# Patient Record
Sex: FEMALE | Race: BLACK OR AFRICAN AMERICAN | Employment: OTHER | URBAN - METROPOLITAN AREA
[De-identification: names, ages, dates, MRNs, and addresses within clinical notes are randomized per-mention and may not be internally consistent; named-entity substitution may affect disease eponyms.]

---

## 2017-05-29 ENCOUNTER — DOCUMENTATION ONLY (OUTPATIENT)
Dept: ONCOLOGY | Age: 82
End: 2017-05-29

## 2017-06-02 ENCOUNTER — HOSPITAL ENCOUNTER (OUTPATIENT)
Dept: LAB | Age: 82
Discharge: HOME | End: 2017-06-02
Payer: MEDICARE

## 2017-06-02 ENCOUNTER — OFFICE VISIT (OUTPATIENT)
Dept: ONCOLOGY | Age: 82
End: 2017-06-02

## 2017-06-02 VITALS
WEIGHT: 147.6 LBS | DIASTOLIC BLOOD PRESSURE: 88 MMHG | OXYGEN SATURATION: 98 % | HEART RATE: 64 BPM | RESPIRATION RATE: 16 BRPM | BODY MASS INDEX: 23.72 KG/M2 | TEMPERATURE: 97 F | SYSTOLIC BLOOD PRESSURE: 190 MMHG | HEIGHT: 66 IN

## 2017-06-02 DIAGNOSIS — D69.3 ITP SECONDARY TO INFECTION (HCC): ICD-10-CM

## 2017-06-02 DIAGNOSIS — G40.909 SEIZURE DISORDER (HCC): ICD-10-CM

## 2017-06-02 DIAGNOSIS — F41.9 ANXIETY: ICD-10-CM

## 2017-06-02 DIAGNOSIS — D69.3 ITP SECONDARY TO INFECTION (HCC): Primary | ICD-10-CM

## 2017-06-02 PROCEDURE — 80053 COMPREHEN METABOLIC PANEL: CPT

## 2017-06-02 PROCEDURE — 85025 COMPLETE CBC W/AUTO DIFF WBC: CPT

## 2017-06-02 PROCEDURE — 36415 COLL VENOUS BLD VENIPUNCTURE: CPT

## 2017-06-02 PROCEDURE — 83615 LACTATE (LD) (LDH) ENZYME: CPT

## 2017-06-02 RX ORDER — CHLORHEXIDINE GLUCONATE 1.2 MG/ML
RINSE ORAL
Refills: 1 | COMMUNITY
Start: 2017-05-26 | End: 2021-01-01

## 2017-06-02 NOTE — MR AVS SNAPSHOT
Visit Information Date & Time Provider Department Dept. Phone Encounter #  
 6/2/2017  9:00 AM Daniel Galaviz MD [facility] Oncology at [address] [id number] Upcoming Health Maintenance Date Due DTaP/Tdap/Td series (1 - Tdap) 7/26/1958 ZOSTER VACCINE AGE 60> 7/26/1997 GLAUCOMA SCREENING Q2Y 7/26/2002 OSTEOPOROSIS SCREENING (DEXA) 7/26/2002 Pneumococcal 65+ Low/Medium Risk (1 of 2 - PCV13) 7/26/2002 MEDICARE YEARLY EXAM 7/26/2002 INFLUENZA AGE 9 TO ADULT 8/1/2017 Allergies as of 6/2/2017  Review Complete On: 6/2/2017 By: Melinda Trimble Severity Noted Reaction Type Reactions Aspirin  09/17/2012    Hives Tramadol  09/17/2012    Seizures Current Immunizations  Never Reviewed No immunizations on file. Not reviewed this visit You Were Diagnosed With   
  
 Codes Comments ITP secondary to infection    -  Primary ICD-10-CM: D69.59 ICD-9-CM: 287.49 Vitals BP Pulse Temp Resp Height(growth percentile) Weight(growth percentile) 190/88 64 97 °F (36.1 °C) 16 5' 6\" (1.676 m) 147 lb 9.6 oz (67 kg) SpO2 BMI OB Status Smoking Status 98% 23.82 kg/m2 Postmenopausal Never Smoker Vitals History BMI and BSA Data Body Mass Index Body Surface Area  
 23.82 kg/m 2 1.77 m 2 Preferred Pharmacy Pharmacy Name Phone [id number] [phone] Your Updated Medication List  
  
   
This list is accurate as of: 6/2/17 10:33 AM.  Always use your most recent med list.  
  
  
  
  
 CALCIUM + D PO Take 2 Tabs by mouth daily. 1,200/1,000MG  
  
 chlorhexidine 0.12 % solution Commonly known as:  PERIDEX  
USE AS DIRECTED ON BOTTLE  
  
 danazol 200 mg capsule Commonly known as:  DANOCRINE Take 200 mg by mouth daily. levETIRAcetam 500 mg tablet Commonly known as:  KEPPRA Take 500 mg by mouth daily. lisinopril 20 mg tablet Commonly known as:  PRINIVIL, ZESTRIL  
TAKE 1 TABLET BY MOUTH EVERY DAY To-Do List   
 06/02/2017 Lab:  CBC WITH AUTOMATED DIFF   
  
 06/02/2017 Lab:  LD   
  
 06/02/2017 Lab:  METABOLIC PANEL, COMPREHENSIVE Introducing hospitals & HEALTH SERVICES! [employer] introduces [employer] patient portal. Now you can access parts of your medical record, email your doctor's office, and request medication refills online. 1. In your internet browser, go to [email or web address] 2. Click on the First Time User? Click Here link in the Sign In box. You will see the New Member Sign Up page. 3. Enter your Inmoo Access Code exactly as it appears below. You will not need to use this code after youve completed the sign-up process. If you do not sign up before the expiration date, you must request a new code. · Inmoo Access Code: [email or web address] Expires: 8/31/2017 10:33 AM 
 
4. Enter the last four digits of your Social Security Number (xxxx) and Date of Birth (mm/dd/yyyy) as indicated and click Submit. You will be taken to the next sign-up page. 5. Create a Inmoo ID. This will be your Inmoo login ID and cannot be changed, so think of one that is secure and easy to remember. 6. Create a Inmoo password. You can change your password at any time. 7. Enter your Password Reset Question and Answer. This can be used at a later time if you forget your password. 8. Enter your e-mail address. You will receive e-mail notification when new information is available in [address]. 9. Click Sign Up. You can now view and download portions of your medical record. 10. Click the Download Summary menu link to download a portable copy of your medical information. If you have questions, please visit the Frequently Asked Questions section of the Inmoo website. Remember, Inmoo is NOT to be used for urgent needs. For medical emergencies, dial 911. Now available from your iPhone and Android! Please provide this summary of care documentation to your next provider. Your primary care clinician is listed as Xochilt De Guzman. If you have any questions after today's visit, please call [phone].

## 2017-06-02 NOTE — PROGRESS NOTES
Hematology Consult    REASON FOR CONSULT: Thrombocytopenia  REQUESTED BY: Self    HISTORY OF PRESENT ILLNESS: Ms. Nader Davidson is a 80 y.o. female with seizure disorder, anxiety and ITP diagnosed in 2002 who presents now to [facility] care after Dr. Stover Floor departure. She last saw Dr. Bocanegra Early on April 20, 2017. Per records she was diagnosed with ITP in 2002 at which time she was treated with steroids. Her past treatments include WinRho, IVIG, splenectomy. She had been on low-dose danazol for more than 10 years she has had no significant complaints on danazol. .  Last available CBC from 11/7/16 which showed platelet count of 228J, WBC count of 2800  She had CT chest on 5/11/2006 which was notable for abnormally enlarged lymph nodes identified in the right supraclavicular neck measuring 1.8 x 1.7 cm, large confluence measuring 3.7 x 2.4 cm. Left neck adenopathy noted as well measuring 1.8 x 1.2 cm. Thyroid enlargement and nodularity was noted. There was a tiny 4 mm noncalcified nodule in the right middle lobe of the lung. CT abdomen was unremarkable. Numerous groin lymph nodes were noted but they were normal in size and architecture. Patient states that she continues to take Danazol 200 mg daily. Denies any history of blood clots, bleeding, joint pains, flushing, headaches, vision changes, recent seizures. She is unclear why she gets the seizures and attributes it to her anxiety. She is unaware of her prior CT that showed enlarged lymph nodes. Patient is a poor historian. Notes that she has not had any recent bleeding. Denies any lumps or bumps that are new. No belly pain, hematuria, dysuria, fevers, chills, night sweats, weight or appetite changes.     Past Medical History:   Diagnosis Date    Hypertension     Seasonal allergic rhinitis     Spleen absent        Past Surgical History:   Procedure Laterality Date    ABDOMEN SURGERY PROC UNLISTED      spleenectomy       Allergies   Allergen Reactions    Aspirin Hives    Tramadol Seizures       Current Outpatient Prescriptions   Medication Sig Dispense Refill    chlorhexidine (PERIDEX) 0.12 % solution USE AS DIRECTED ON BOTTLE  1    lisinopril (PRINIVIL, ZESTRIL) 20 mg tablet TAKE 1 TABLET BY MOUTH EVERY DAY 90 Tab 3    CALCIUM CARBONATE/VITAMIN D3 (CALCIUM + D PO) Take 2 Tabs by mouth daily. 1,200/1,000MG      danazol (DANOCRINE) 200 mg capsule Take 200 mg by mouth daily.  levetiracetam (KEPPRA) 500 mg tablet Take 500 mg by mouth daily. Social History     Social History    Marital status:      Spouse name: N/A    Number of children: N/A    Years of education: N/A     Social History Main Topics    Smoking status: Never Smoker    Smokeless tobacco: None    Alcohol use No    Drug use: No    Sexual activity: Not Asked     Other Topics Concern    None     Social History Narrative       Family History   Problem Relation Age of Onset    Coronary Artery Disease Neg Hx        ROS  A 12 point review of systems was obtained and is negative except as listed in HPI.   ECOG PS is 1      Physical Examination:   Visit Vitals    /88    Pulse 64    Temp 97 °F (36.1 °C)    Resp 16    Ht 5' 6\" (1.676 m)    Wt 147 lb 9.6 oz (67 kg)    SpO2 98%    BMI 23.82 kg/m2     General appearance - alert, well appearing, and in no distress  Mental status - oriented to person, place, and time  Mouth - mucous membranes moist, pharynx normal without lesions  Neck - supple, no significant adenopathy  Lymphatics - no palpable lymphadenopathy, no hepatosplenomegaly  Chest - clear to auscultation, no wheezes, rales or rhonchi, symmetric air entry  Heart - normal rate, regular rhythm, normal S1, S2, no murmurs, rubs, clicks or gallops  Abdomen - soft, nontender, nondistended, no masses or organomegaly, bowel sounds present  Back exam - full range of motion, no tenderness, palpable spasm or pain on motion  Neurological - normal speech, no focal findings or movement disorder noted  Musculoskeletal - no joint tenderness, deformity or swelling  Extremities - peripheral pulses normal, no pedal edema, no clubbing or cyanosis  Skin - normal coloration and turgor, no rashes, no suspicious skin lesions noted    LABS  Lab Results   Component Value Date/Time    WBC 5.1 06/27/2016 04:24 PM    HGB 13.6 06/27/2016 04:24 PM    HCT 40.9 06/27/2016 04:24 PM    PLATELET 637 61/76/6153 04:24 PM    MCV 87.0 06/27/2016 04:24 PM    ABS. NEUTROPHILS 3.2 06/27/2016 04:24 PM     Lab Results   Component Value Date/Time    Sodium 142 06/27/2016 04:24 PM    Potassium 3.8 06/27/2016 04:24 PM    Chloride 108 06/27/2016 04:24 PM    CO2 28 06/27/2016 04:24 PM    Glucose 73 06/27/2016 04:24 PM    BUN 15 06/27/2016 04:24 PM    Creatinine 1.16 06/27/2016 04:24 PM    GFR est AA 55 06/27/2016 04:24 PM    GFR est non-AA 45 06/27/2016 04:24 PM    Calcium 9.0 06/27/2016 04:24 PM     Lab Results   Component Value Date/Time    AST (SGOT) 60 09/17/2012 03:40 PM    Alk. phosphatase 53 09/17/2012 03:40 PM    Protein, total 7.6 09/17/2012 03:40 PM    Albumin 2.9 09/17/2012 03:40 PM    Globulin 4.7 09/17/2012 03:40 PM    A-G Ratio 0.6 09/17/2012 03:40 PM       Reviewed available external records that include progress notes, prior lab work, CT scan reports. ASSESSMENT  Ms. Sebastien Monsivais is a 80 y.o. female with history of seizure disorder, anxiety, presumed ITP diagnosed by Dr. Carmen Gutierrez  status post splenectomy in 2002. Prior treatments have included WinRho, steroids, IVIG. She had a partial response after splenectomy and has since been on danazol that she says she is tolerating well. Records suggest that she has had normal platelet counts lately. DISCUSSION    Patient has been on Danazol for over 10 years now with normal platelet counts. Reviewed side effects such as blood clots, erythrocytosis, liver failure, raised intracranial pressure.  Given that she has had normal platelet counts for several years now, I believe this can be titrated down as long term safety data are not available. Patient however has declined getting off the drug. I discussed that a low platelet count is not of significant health concern as long as platelets stay over 86Q. In her case with h/o seizures and her frailty, may try to keep closer to 50k. It is possible that she may be able to maintain these counts off Danazol. Patient is not agreeable to this. Understands side effects. Will obtain baseline labs to ensure safety. If ok will fill Danazol at 200 mg daily but will discuss tapering again next visit. Currently no palpable adenopathy, will monitor. PLAN  - CBC, LDH, CMP  - RTC 2 months  - Danazol to be refilled only if labs adequate      Mary Harmon MD    Ms. Cecilia Brambila has a reminder for a \"due or due soon\" health maintenance. I have asked that she contact her primary care provider for follow-up on this health maintenance.

## 2017-06-03 LAB
ALBUMIN SERPL-MCNC: 4.1 G/DL (ref 3.5–4.8)
ALBUMIN/GLOB SERPL: 1.2 {RATIO} (ref 1.2–2.2)
ALP SERPL-CCNC: 66 IU/L (ref 39–117)
ALT SERPL-CCNC: 28 IU/L (ref 0–32)
AST SERPL-CCNC: 28 IU/L (ref 0–40)
BASOPHILS # BLD AUTO: 0 X10E3/UL (ref 0–0.2)
BASOPHILS NFR BLD AUTO: 1 %
BILIRUB SERPL-MCNC: 0.4 MG/DL (ref 0–1.2)
BUN SERPL-MCNC: 13 MG/DL (ref 8–27)
BUN/CREAT SERPL: 12 (ref 12–28)
CALCIUM SERPL-MCNC: 9.8 MG/DL (ref 8.7–10.3)
CHLORIDE SERPL-SCNC: 105 MMOL/L (ref 96–106)
CO2 SERPL-SCNC: 24 MMOL/L (ref 18–29)
CREAT SERPL-MCNC: 1.11 MG/DL (ref 0.57–1)
EOSINOPHIL # BLD AUTO: 0.1 X10E3/UL (ref 0–0.4)
EOSINOPHIL NFR BLD AUTO: 3 %
ERYTHROCYTE [DISTWIDTH] IN BLOOD BY AUTOMATED COUNT: 15.5 % (ref 12.3–15.4)
GLOBULIN SER CALC-MCNC: 3.3 G/DL (ref 1.5–4.5)
GLUCOSE SERPL-MCNC: 73 MG/DL (ref 65–99)
HCT VFR BLD AUTO: 38.7 % (ref 34–46.6)
HGB BLD-MCNC: 13 G/DL (ref 11.1–15.9)
IMM GRANULOCYTES # BLD: 0 X10E3/UL (ref 0–0.1)
IMM GRANULOCYTES NFR BLD: 0 %
LDH SERPL-CCNC: 274 IU/L (ref 119–226)
LYMPHOCYTES # BLD AUTO: 1.5 X10E3/UL (ref 0.7–3.1)
LYMPHOCYTES NFR BLD AUTO: 47 %
MCH RBC QN AUTO: 29 PG (ref 26.6–33)
MCHC RBC AUTO-ENTMCNC: 33.6 G/DL (ref 31.5–35.7)
MCV RBC AUTO: 86 FL (ref 79–97)
MONOCYTES # BLD AUTO: 0.3 X10E3/UL (ref 0.1–0.9)
MONOCYTES NFR BLD AUTO: 9 %
NEUTROPHILS # BLD AUTO: 1.2 X10E3/UL (ref 1.4–7)
NEUTROPHILS NFR BLD AUTO: 40 %
PLATELET # BLD AUTO: 258 X10E3/UL (ref 150–379)
POTASSIUM SERPL-SCNC: 4.1 MMOL/L (ref 3.5–5.2)
PROT SERPL-MCNC: 7.4 G/DL (ref 6–8.5)
RBC # BLD AUTO: 4.48 X10E6/UL (ref 3.77–5.28)
SODIUM SERPL-SCNC: 146 MMOL/L (ref 134–144)
WBC # BLD AUTO: 3.1 X10E3/UL (ref 3.4–10.8)

## 2017-07-07 ENCOUNTER — TELEPHONE (OUTPATIENT)
Dept: ONCOLOGY | Age: 82
End: 2017-07-07

## 2017-07-07 DIAGNOSIS — D69.3 ITP SECONDARY TO INFECTION (HCC): Primary | ICD-10-CM

## 2017-07-07 RX ORDER — DANAZOL 100 MG/1
100 CAPSULE ORAL DAILY
Qty: 30 CAP | Refills: 2 | Status: SHIPPED | OUTPATIENT
Start: 2017-07-07 | End: 2017-08-06

## 2017-07-07 NOTE — TELEPHONE ENCOUNTER
Call placed to patient,  answered call, HIPAA verified. Patient  updated that provider reviewed labs and has sen updated prescription to patient pharmacy with dose reduction. Will keep follow up appointment with provider on 8/2. Patient  verbalized understanding and thanked for call, will pass along to patient. Encouraged to have patient call with any questions.

## 2017-07-10 ENCOUNTER — TELEPHONE (OUTPATIENT)
Dept: ONCOLOGY | Age: 82
End: 2017-07-10

## 2017-07-10 NOTE — TELEPHONE ENCOUNTER
HIPAA verified. Stated was advised to reduce danazol from 200mg to 100mg. Wanted to know what to do with the 200mg pills. Advised to place out of reach and not take. Stated has questions about lab bills. \"I never got any bills from Dr. Jeovanny Villarreal". Also wanted to know when next labs due. Advised would forward to provider and thanked for call.

## 2017-07-11 NOTE — TELEPHONE ENCOUNTER
Call placed to patient, HIPAA verified. Patient advised of need for repeat labs prior to 8/2 appt with Dr. Genia Howell. Patient states that she does not see a need for labs at this time, she has not yet started the decreased dose of Danazol, is awaiting delivery today. States that she will have labs drawn at follow up with provider on 8/2 if they are still needed at that time. Thanked for call.

## 2017-07-17 ENCOUNTER — TELEPHONE (OUTPATIENT)
Dept: ONCOLOGY | Age: 82
End: 2017-07-17

## 2017-07-17 NOTE — TELEPHONE ENCOUNTER
Call placed to patient, HIPAA verified. Patient advised that provider would like to have follow up appointment 1 month after starting decreased dose of Danazol at   100mg, with labs. Patient rescheduled appointment for 8/8, requested lab slips be mailed to her home, instructed patient to have labs drawn one week prior to appt. Patient verbalized understanding and thanked for call. Lab slips placed in mail to address provided.

## 2017-07-25 ENCOUNTER — HOSPITAL ENCOUNTER (OUTPATIENT)
Dept: LAB | Age: 82
Discharge: HOME | End: 2017-07-25
Payer: MEDICARE

## 2017-07-25 PROCEDURE — 83615 LACTATE (LD) (LDH) ENZYME: CPT

## 2017-07-25 PROCEDURE — 36415 COLL VENOUS BLD VENIPUNCTURE: CPT

## 2017-07-25 PROCEDURE — 85025 COMPLETE CBC W/AUTO DIFF WBC: CPT

## 2017-07-25 PROCEDURE — 80053 COMPREHEN METABOLIC PANEL: CPT

## 2017-07-26 LAB
ALBUMIN SERPL-MCNC: 4 G/DL (ref 3.5–4.8)
ALBUMIN/GLOB SERPL: 1.3 {RATIO} (ref 1.2–2.2)
ALP SERPL-CCNC: 66 IU/L (ref 39–117)
ALT SERPL-CCNC: 28 IU/L (ref 0–32)
AST SERPL-CCNC: 28 IU/L (ref 0–40)
BASOPHILS # BLD AUTO: 0 X10E3/UL (ref 0–0.2)
BASOPHILS NFR BLD AUTO: 1 %
BILIRUB SERPL-MCNC: 0.3 MG/DL (ref 0–1.2)
BUN SERPL-MCNC: 14 MG/DL (ref 8–27)
BUN/CREAT SERPL: 12 (ref 12–28)
CALCIUM SERPL-MCNC: 9.5 MG/DL (ref 8.7–10.3)
CHLORIDE SERPL-SCNC: 106 MMOL/L (ref 96–106)
CO2 SERPL-SCNC: 23 MMOL/L (ref 18–29)
CREAT SERPL-MCNC: 1.18 MG/DL (ref 0.57–1)
EOSINOPHIL # BLD AUTO: 0.1 X10E3/UL (ref 0–0.4)
EOSINOPHIL NFR BLD AUTO: 2 %
ERYTHROCYTE [DISTWIDTH] IN BLOOD BY AUTOMATED COUNT: 15.5 % (ref 12.3–15.4)
GLOBULIN SER CALC-MCNC: 3 G/DL (ref 1.5–4.5)
GLUCOSE SERPL-MCNC: 79 MG/DL (ref 65–99)
HCT VFR BLD AUTO: 38.9 % (ref 34–46.6)
HGB BLD-MCNC: 12.5 G/DL (ref 11.1–15.9)
IMM GRANULOCYTES # BLD: 0 X10E3/UL (ref 0–0.1)
IMM GRANULOCYTES NFR BLD: 0 %
LDH SERPL-CCNC: 256 IU/L (ref 119–226)
LYMPHOCYTES # BLD AUTO: 1.4 X10E3/UL (ref 0.7–3.1)
LYMPHOCYTES NFR BLD AUTO: 32 %
MCH RBC QN AUTO: 28.7 PG (ref 26.6–33)
MCHC RBC AUTO-ENTMCNC: 32.1 G/DL (ref 31.5–35.7)
MCV RBC AUTO: 89 FL (ref 79–97)
MONOCYTES # BLD AUTO: 0.4 X10E3/UL (ref 0.1–0.9)
MONOCYTES NFR BLD AUTO: 9 %
NEUTROPHILS # BLD AUTO: 2.5 X10E3/UL (ref 1.4–7)
NEUTROPHILS NFR BLD AUTO: 56 %
PLATELET # BLD AUTO: 276 X10E3/UL (ref 150–379)
POTASSIUM SERPL-SCNC: 4 MMOL/L (ref 3.5–5.2)
PROT SERPL-MCNC: 7 G/DL (ref 6–8.5)
RBC # BLD AUTO: 4.36 X10E6/UL (ref 3.77–5.28)
SODIUM SERPL-SCNC: 147 MMOL/L (ref 134–144)
WBC # BLD AUTO: 4.4 X10E3/UL (ref 3.4–10.8)

## 2017-07-26 NOTE — PROGRESS NOTES
Call received from patients , HIPAA verified. Advised of provider note, verbalized understanding. Will pass along to patient. Thanked for call.

## 2017-08-03 ENCOUNTER — TELEPHONE (OUTPATIENT)
Dept: ONCOLOGY | Age: 82
End: 2017-08-03

## 2017-08-03 NOTE — TELEPHONE ENCOUNTER
Pt  called HIPAA verified. They refilled prescription for danazol and when they got it home they realized it was 200 mg and not 100 mg. [facility]. Pt transferred care from Dr Dario Jacob to Dr Kristina Clarke. Frantz reduced dose to 100 mg. Per Dr Kristina Clarke, OK to take 200 mg every other day and then refill prescription at 100 mg. Education provided to  regarding reviewing all medications prior to leaving pharmacy. Verbalized understanding. Encouraged to call with any ongoing questions/concerns.

## 2017-08-08 ENCOUNTER — OFFICE VISIT (OUTPATIENT)
Dept: ONCOLOGY | Age: 82
End: 2017-08-08

## 2017-08-08 VITALS
RESPIRATION RATE: 16 BRPM | DIASTOLIC BLOOD PRESSURE: 82 MMHG | HEART RATE: 68 BPM | SYSTOLIC BLOOD PRESSURE: 186 MMHG | WEIGHT: 150.4 LBS | TEMPERATURE: 97.2 F | HEIGHT: 66 IN | BODY MASS INDEX: 24.17 KG/M2 | OXYGEN SATURATION: 98 %

## 2017-08-08 DIAGNOSIS — D69.3 ITP SECONDARY TO INFECTION (HCC): Primary | ICD-10-CM

## 2017-08-08 RX ORDER — DANAZOL 200 MG/1
CAPSULE ORAL
Refills: 4 | COMMUNITY
Start: 2017-07-30 | End: 2017-10-12 | Stop reason: SDUPTHER

## 2017-08-08 RX ORDER — CITALOPRAM 10 MG/1
TABLET ORAL
COMMUNITY
Start: 2017-08-07

## 2017-08-08 NOTE — PROGRESS NOTES
Hematology follow up    REASON FOR VISIT:  Thrombocytopenia- ITP    HISTORY OF PRESENT ILLNESS: Ms. Mabel Maradiaga is a [de-identified] y.o. female with seizure disorder, anxiety and ITP diagnosed in 2002 who has been following with Dr. Salvador Solano for ITP and established care with us in May 2017. Since her platelets had been normal, offered to stop Danazol which she declined. We then discussed tapering it. She started the taper 4 days ago and comes for a follow up    Hematologic history    She last saw Dr. Salvador Solano on April 20, 2017. Per records she was diagnosed with ITP in 2002 at which time she was treated with steroids. Her past treatments include WinRho, IVIG, splenectomy. She had been on low-dose danazol for more than 10 years she has had no significant complaints on danazol. .  Last available CBC from 11/7/16 which showed platelet count of 988F, WBC count of 2800  She had CT chest on 5/11/2006 which was notable for abnormally enlarged lymph nodes identified in the right supraclavicular neck measuring 1.8 x 1.7 cm, large confluence measuring 3.7 x 2.4 cm. Left neck adenopathy noted as well measuring 1.8 x 1.2 cm. Thyroid enlargement and nodularity was noted. There was a tiny 4 mm noncalcified nodule in the right middle lobe of the lung. CT abdomen was unremarkable. Numerous groin lymph nodes were noted but they were normal in size and architecture. Patient was taking Danazol 200 mg daily.   .    Started reduced dose Danazol (200 mg every other day on 8/4/17)    Past Medical History:   Diagnosis Date    Hypertension     Seasonal allergic rhinitis     Spleen absent        Past Surgical History:   Procedure Laterality Date    ABDOMEN SURGERY PROC UNLISTED      spleenectomy       Allergies   Allergen Reactions    Aspirin Hives    Tramadol Seizures       Current Outpatient Prescriptions   Medication Sig Dispense Refill    citalopram (CELEXA) 10 mg tablet       danazol (DANOCRINE) 200 mg capsule 100mg daily  4    chlorhexidine (PERIDEX) 0.12 % solution USE AS DIRECTED ON BOTTLE  1    lisinopril (PRINIVIL, ZESTRIL) 20 mg tablet TAKE 1 TABLET BY MOUTH EVERY DAY 90 Tab 3    CALCIUM CARBONATE/VITAMIN D3 (CALCIUM + D PO) Take 2 Tabs by mouth daily. 1,200/1,000MG      levetiracetam (KEPPRA) 500 mg tablet Take 500 mg by mouth daily. Social History     Social History    Marital status:      Spouse name: N/A    Number of children: N/A    Years of education: N/A     Social History Main Topics    Smoking status: Never Smoker    Smokeless tobacco: None    Alcohol use No    Drug use: No    Sexual activity: Not Asked     Other Topics Concern    None     Social History Narrative       Family History   Problem Relation Age of Onset    Coronary Artery Disease Neg Hx        ROS  Denies any history of blood clots, bleeding, joint pains, flushing, headaches, vision changes, recent seizures. She is unclear why she gets the seizures and attributes it to her anxiety. Patient is a poor historian. Notes that she has not had any recent bleeding. Denies any lumps or bumps that are new. No belly pain, hematuria, dysuria, fevers, chills, night sweats, weight or appetite changes.  is with her.      ECOG PS is 1      Physical Examination:   Visit Vitals    /82    Pulse 68    Temp 97.2 °F (36.2 °C)    Resp 16    Ht 5' 6\" (1.676 m)    Wt 150 lb 6.4 oz (68.2 kg)    SpO2 98%    BMI 24.28 kg/m2     General appearance - alert, well appearing, and in no distress  Mental status - oriented to person, place, and time  Mouth - mucous membranes moist, pharynx normal without lesions  Neck - supple, no significant adenopathy  Lymphatics - no palpable lymphadenopathy, no hepatosplenomegaly  Chest - clear to auscultation, no wheezes, rales or rhonchi, symmetric air entry  Heart - normal rate, regular rhythm, normal S1, S2, no murmurs, rubs, clicks or gallops  Abdomen - soft, nontender, nondistended, no masses or organomegaly, bowel sounds present  Neurological - normal speech, no focal findings or movement disorder noted    LABS  Lab Results   Component Value Date/Time    WBC 4.4 07/25/2017 08:43 AM    HGB 12.5 07/25/2017 08:43 AM    HCT 38.9 07/25/2017 08:43 AM    PLATELET 335 39/30/1480 08:43 AM    MCV 89 07/25/2017 08:43 AM    ABS. NEUTROPHILS 2.5 07/25/2017 08:43 AM     Lab Results   Component Value Date/Time    Sodium 147 07/25/2017 08:43 AM    Potassium 4.0 07/25/2017 08:43 AM    Chloride 106 07/25/2017 08:43 AM    CO2 23 07/25/2017 08:43 AM    Glucose 79 07/25/2017 08:43 AM    BUN 14 07/25/2017 08:43 AM    Creatinine 1.18 07/25/2017 08:43 AM    GFR est AA 51 07/25/2017 08:43 AM    GFR est non-AA 44 07/25/2017 08:43 AM    Calcium 9.5 07/25/2017 08:43 AM     Lab Results   Component Value Date/Time    AST (SGOT) 28 07/25/2017 08:43 AM    Alk. phosphatase 66 07/25/2017 08:43 AM    Protein, total 7.0 07/25/2017 08:43 AM    Albumin 4.0 07/25/2017 08:43 AM    Globulin 4.7 09/17/2012 03:40 PM    A-G Ratio 1.3 07/25/2017 08:43 AM       Reviewed available external records that include progress notes, prior lab work, CT scan reports. ASSESSMENT  Ms. Florence Chaudhry is a [de-identified] y.o. female with history of seizure disorder, anxiety, presumed ITP diagnosed by Dr. Amado Brasher  status post splenectomy in 2002. Prior treatments have included WinRho, steroids, IVIG. She had a partial response after splenectomy and has since been on danazol that she says she is tolerating well. She has had normal platelet count for years on Danazol. Declined stopping the drug and started to taper on 8/4/17. DISCUSSION  Labs reviewed. Platelets normal, LFTS normal, baseline Creatinine is stable. Patient has been on Danazol for over 10 years now with normal platelet counts. Reviewed side effects again such as blood clots, erythrocytosis, liver failure, raised intracranial pressure. Given that she has had normal platelet counts for several years now.  I reiterated my recommendation to stop or titrate down as long term safety data are not available. Discussed that ITP does not need treatment for platelets over 16-61A. They do not have a good understanding of the disease, despite multiple counseling attempts. PLAN  - Danazol 200 mg every other day   - RTC 2 months with Jules Sandhoff, MD    Ms. Radha Barney has a reminder for a \"due or due soon\" health maintenance. I have asked that she contact her primary care provider for follow-up on this health maintenance.

## 2017-08-08 NOTE — MR AVS SNAPSHOT
Visit Information Date & Time Provider Department Dept. Phone Encounter #  
 8/8/2017  8:30 AM Charmaine Remy  [facility] Oncology at [address] [id number] Upcoming Health Maintenance Date Due DTaP/Tdap/Td series (1 - Tdap) 7/26/1958 ZOSTER VACCINE AGE 60> 5/26/1997 GLAUCOMA SCREENING Q2Y 7/26/2002 OSTEOPOROSIS SCREENING (DEXA) 7/26/2002 Pneumococcal 65+ Low/Medium Risk (1 of 2 - PCV13) 7/26/2002 MEDICARE YEARLY EXAM 7/26/2002 INFLUENZA AGE 9 TO ADULT 8/1/2017 Allergies as of 8/8/2017  Review Complete On: 8/8/2017 By: Jenni Portillo Severity Noted Reaction Type Reactions Aspirin  09/17/2012    Hives Tramadol  09/17/2012    Seizures Current Immunizations  Never Reviewed No immunizations on file. Not reviewed this visit You Were Diagnosed With   
  
 Codes Comments ITP secondary to infection    -  Primary ICD-10-CM: D69.59 ICD-9-CM: 287.49 Vitals BP Pulse Temp Resp Height(growth percentile) Weight(growth percentile) 186/82 68 97.2 °F (36.2 °C) 16 5' 6\" (1.676 m) 150 lb 6.4 oz (68.2 kg) SpO2 BMI OB Status Smoking Status 98% 24.28 kg/m2 Postmenopausal Never Smoker Vitals History BMI and BSA Data Body Mass Index Body Surface Area  
 24.28 kg/m 2 1.78 m 2 Preferred Pharmacy Pharmacy Name Phone [id number] [phone] Your Updated Medication List  
  
   
This list is accurate as of: 8/8/17  9:15 AM.  Always use your most recent med list.  
  
  
  
  
 CALCIUM + D PO Take 2 Tabs by mouth daily. 1,200/1,000MG  
  
 chlorhexidine 0.12 % solution Commonly known as:  PERIDEX  
USE AS DIRECTED ON BOTTLE  
  
 citalopram 10 mg tablet Commonly known as:  CELEXA  
  
 danazol 200 mg capsule Commonly known as:  DANOCRINE  
100mg daily  
  
 levETIRAcetam 500 mg tablet Commonly known as:  KEPPRA Take 500 mg by mouth daily. lisinopril 20 mg tablet Commonly known as:  PRINIVIL, ZESTRIL  
TAKE 1 TABLET BY MOUTH EVERY DAY To-Do List   
 10/09/2017 Lab:  CBC WITH AUTOMATED DIFF Introducing [facility] & [facility] SERVICES! [employer] introduces [employer] patient portal. Now you can access parts of your medical record, email your doctor's office, and request medication refills online. 1. In your internet browser, go to [email or web address] 2. Click on the First Time User? Click Here link in the Sign In box. You will see the New Member Sign Up page. 3. Enter your [employer] Access Code exactly as it appears below. You will not need to use this code after youve completed the sign-up process. If you do not sign up before the expiration date, you must request a new code. · [employer] Access Code: [email or web address] Expires: 8/31/2017 10:33 AM 
 
4. Enter the last four digits of your Social Security Number (xxxx) and Date of Birth (mm/dd/yyyy) as indicated and click Submit. You will be taken to the next sign-up page. 5. Create a [employer] ID. This will be your [employer] login ID and cannot be changed, so think of one that is secure and easy to remember. 6. Create a [employer] password. You can change your password at any time. 7. Enter your Password Reset Question and Answer. This can be used at a later time if you forget your password. 8. Enter your e-mail address. You will receive e-mail notification when new information is available in [address] 9. Click Sign Up. You can now view and download portions of your medical record. 10. Click the Download Summary menu link to download a portable copy of your medical information. If you have questions, please visit the Frequently Asked Questions section of the [employer] website. Remember, [employer] is NOT to be used for urgent needs. For medical emergencies, dial 911. Now available from your iPhone and Android! Please provide this summary of care documentation to your next provider. Your primary care clinician is listed as Robinson Luciano. If you have any questions after today's visit, please call [phone].

## 2017-09-29 ENCOUNTER — HOSPITAL ENCOUNTER (OUTPATIENT)
Dept: LAB | Age: 82
Discharge: HOME | End: 2017-09-29
Payer: MEDICARE

## 2017-09-29 PROCEDURE — 36415 COLL VENOUS BLD VENIPUNCTURE: CPT

## 2017-09-29 PROCEDURE — 85025 COMPLETE CBC W/AUTO DIFF WBC: CPT

## 2017-09-30 LAB
BASOPHILS # BLD AUTO: 0 X10E3/UL (ref 0–0.2)
BASOPHILS NFR BLD AUTO: 1 %
EOSINOPHIL # BLD AUTO: 0.1 X10E3/UL (ref 0–0.4)
EOSINOPHIL NFR BLD AUTO: 3 %
ERYTHROCYTE [DISTWIDTH] IN BLOOD BY AUTOMATED COUNT: 15.4 % (ref 12.3–15.4)
HCT VFR BLD AUTO: 40.1 % (ref 34–46.6)
HGB BLD-MCNC: 13.2 G/DL (ref 11.1–15.9)
IMM GRANULOCYTES # BLD: 0 X10E3/UL (ref 0–0.1)
IMM GRANULOCYTES NFR BLD: 0 %
LYMPHOCYTES # BLD AUTO: 1.5 X10E3/UL (ref 0.7–3.1)
LYMPHOCYTES NFR BLD AUTO: 38 %
MCH RBC QN AUTO: 28.9 PG (ref 26.6–33)
MCHC RBC AUTO-ENTMCNC: 32.9 G/DL (ref 31.5–35.7)
MCV RBC AUTO: 88 FL (ref 79–97)
MONOCYTES # BLD AUTO: 0.5 X10E3/UL (ref 0.1–0.9)
MONOCYTES NFR BLD AUTO: 13 %
NEUTROPHILS # BLD AUTO: 1.8 X10E3/UL (ref 1.4–7)
NEUTROPHILS NFR BLD AUTO: 45 %
PLATELET # BLD AUTO: 248 X10E3/UL (ref 150–379)
RBC # BLD AUTO: 4.57 X10E6/UL (ref 3.77–5.28)
WBC # BLD AUTO: 3.9 X10E3/UL (ref 3.4–10.8)

## 2017-10-09 DIAGNOSIS — D69.3 ITP SECONDARY TO INFECTION (HCC): ICD-10-CM

## 2017-10-12 ENCOUNTER — OFFICE VISIT (OUTPATIENT)
Dept: ONCOLOGY | Age: 82
End: 2017-10-12

## 2017-10-12 VITALS
RESPIRATION RATE: 20 BRPM | TEMPERATURE: 97.3 F | OXYGEN SATURATION: 98 % | WEIGHT: 149.2 LBS | DIASTOLIC BLOOD PRESSURE: 82 MMHG | HEART RATE: 60 BPM | SYSTOLIC BLOOD PRESSURE: 175 MMHG | BODY MASS INDEX: 23.98 KG/M2 | HEIGHT: 66 IN

## 2017-10-12 DIAGNOSIS — D69.3 ITP SECONDARY TO INFECTION (HCC): Primary | ICD-10-CM

## 2017-10-12 RX ORDER — DANAZOL 200 MG/1
200 CAPSULE ORAL EVERY OTHER DAY
Qty: 90 CAP | Refills: 2 | Status: SHIPPED | OUTPATIENT
Start: 2017-10-12 | End: 2018-04-17 | Stop reason: SDUPTHER

## 2017-10-12 NOTE — PROGRESS NOTES
Timothy Barajas is a [de-identified] y.o. female here today for follow up, ITP. Needs refill for danazol if she is to continue.

## 2017-10-12 NOTE — PROGRESS NOTES
Hematology follow up    REASON FOR VISIT:  Thrombocytopenia- ITP    HISTORY OF PRESENT ILLNESS: Ms. Janel Estrada is a [de-identified] y.o. female with seizure disorder, anxiety and ITP diagnosed in 2002 who has been following with Dr. Zita Perez for ITP and established care with us in May 2017. Since her platelets had been normal, offered to stop Danazol which she declined. We then discussed tapering it. She started the taper Aug 2017 and returns to follow up    Hematologic history    She last saw Dr. Zita Perez on April 20, 2017. Per records she was diagnosed with ITP in 2002 at which time she was treated with steroids. Her past treatments include WinRho, IVIG, splenectomy. She had been on low-dose danazol for more than 10 years she has had no significant complaints on danazol. .  Last available CBC from 11/7/16 which showed platelet count of 180U, WBC count of 2800  She had CT chest on 5/11/2006 which was notable for abnormally enlarged lymph nodes identified in the right supraclavicular neck measuring 1.8 x 1.7 cm, large confluence measuring 3.7 x 2.4 cm. Left neck adenopathy noted as well measuring 1.8 x 1.2 cm. Thyroid enlargement and nodularity was noted. There was a tiny 4 mm noncalcified nodule in the right middle lobe of the lung. CT abdomen was unremarkable. Numerous groin lymph nodes were noted but they were normal in size and architecture. Patient was taking Danazol 200 mg daily.   .    Started reduced dose Danazol (200 mg every other day on 8/4/17)    Past Medical History:   Diagnosis Date    Hypertension     Seasonal allergic rhinitis     Spleen absent        Past Surgical History:   Procedure Laterality Date    ABDOMEN SURGERY PROC UNLISTED      spleenectomy       Allergies   Allergen Reactions    Aspirin Hives    Tramadol Seizures       Current Outpatient Prescriptions   Medication Sig Dispense Refill    citalopram (CELEXA) 10 mg tablet       danazol (DANOCRINE) 200 mg capsule 100mg daily  4    chlorhexidine (PERIDEX) 0.12 % solution USE AS DIRECTED ON BOTTLE  1    lisinopril (PRINIVIL, ZESTRIL) 20 mg tablet TAKE 1 TABLET BY MOUTH EVERY DAY 90 Tab 3    CALCIUM CARBONATE/VITAMIN D3 (CALCIUM + D PO) Take 2 Tabs by mouth daily. 1,200/1,000MG      levetiracetam (KEPPRA) 500 mg tablet Take 500 mg by mouth daily. Social History     Social History    Marital status:      Spouse name: N/A    Number of children: N/A    Years of education: N/A     Social History Main Topics    Smoking status: Never Smoker    Smokeless tobacco: Not on file    Alcohol use No    Drug use: No    Sexual activity: Not on file     Other Topics Concern    Not on file     Social History Narrative       Family History   Problem Relation Age of Onset    Coronary Artery Disease Neg Hx        ROS  Denies any history of blood clots, bleeding, joint pains, flushing, headaches, vision changes, recent seizures. She is unclear why she gets the seizures and attributes it to her anxiety. Patient is a poor historian. Notes that she has not had any recent bleeding. Denies any lumps or bumps that are new. No belly pain, hematuria, dysuria, fevers, chills, night sweats, weight or appetite changes.  is with her.      ECOG PS is 1      Physical Examination:   Visit Vitals    Ht 5' 6\" (1.676 m)     General appearance - alert, well appearing, and in no distress  Mental status - oriented to person, place, and time  Mouth - mucous membranes moist, pharynx normal without lesions  Neck - supple, no significant adenopathy  Lymphatics - no palpable lymphadenopathy, no hepatosplenomegaly  Chest - clear to auscultation, no wheezes, rales or rhonchi, symmetric air entry  Heart - normal rate, regular rhythm, normal S1, S2, no murmurs, rubs, clicks or gallops  Abdomen - soft, nontender, nondistended, no masses or organomegaly, bowel sounds present  Neurological - normal speech, no focal findings or movement disorder noted    LABS  Lab Results   Component Value Date/Time    WBC 3.9 09/29/2017 08:53 AM    HGB 13.2 09/29/2017 08:53 AM    HCT 40.1 09/29/2017 08:53 AM    PLATELET 148 87/72/9932 08:53 AM    MCV 88 09/29/2017 08:53 AM    ABS. NEUTROPHILS 1.8 09/29/2017 08:53 AM     Lab Results   Component Value Date/Time    Sodium 147 07/25/2017 08:43 AM    Potassium 4.0 07/25/2017 08:43 AM    Chloride 106 07/25/2017 08:43 AM    CO2 23 07/25/2017 08:43 AM    Glucose 79 07/25/2017 08:43 AM    BUN 14 07/25/2017 08:43 AM    Creatinine 1.18 07/25/2017 08:43 AM    GFR est AA 51 07/25/2017 08:43 AM    GFR est non-AA 44 07/25/2017 08:43 AM    Calcium 9.5 07/25/2017 08:43 AM     Lab Results   Component Value Date/Time    AST (SGOT) 28 07/25/2017 08:43 AM    Alk. phosphatase 66 07/25/2017 08:43 AM    Protein, total 7.0 07/25/2017 08:43 AM    Albumin 4.0 07/25/2017 08:43 AM    Globulin 4.7 09/17/2012 03:40 PM    A-G Ratio 1.3 07/25/2017 08:43 AM       Reviewed available external records that include progress notes, prior lab work, CT scan reports. ASSESSMENT  Ms. Olaf Wellington is a [de-identified] y.o. female with history of seizure disorder, anxiety, presumed ITP diagnosed by Dr. Fabian Pang  status post splenectomy in 2002. Prior treatments have included WinRho, steroids, IVIG. She had a partial response after splenectomy and has since been on danazol that she says she is tolerating well. She has had normal platelet count for years on Danazol. Declined stopping the drug and started to taper on 8/4/17. Platelets remain normal on reduced dose of 200 mg every pother day      DISCUSSION  Patient has been on Danazol for over 10 years now with normal platelet counts. Discussed that ITP does not need treatment for platelets over 59-58B. Tolerating reduced dose at every other day dosing with stable counts    They do not have a good understanding of the disease, despite multiple counseling attempts.  Decline coming off the drug    PLAN  - Danazol 200 mg every other day - refilled  - RTC 6 months with CBC diff      Shyam Monterroso MD    Ms. Michael Tello has a reminder for a \"due or due soon\" health maintenance. I have asked that she contact her primary care provider for follow-up on this health maintenance.

## 2018-03-15 DIAGNOSIS — I10 ESSENTIAL HYPERTENSION: ICD-10-CM

## 2018-03-15 RX ORDER — LISINOPRIL 20 MG/1
TABLET ORAL
Qty: 90 TAB | Refills: 3 | Status: SHIPPED | OUTPATIENT
Start: 2018-03-15

## 2018-04-11 ENCOUNTER — TELEPHONE (OUTPATIENT)
Dept: ONCOLOGY | Age: 83
End: 2018-04-11

## 2018-04-11 NOTE — TELEPHONE ENCOUNTER
Call to patient to remind obtaining labs prior to next follow up appointment on 4/17 at 0900. HIPAA verified.

## 2018-04-12 ENCOUNTER — HOSPITAL ENCOUNTER (OUTPATIENT)
Dept: LAB | Age: 83
Discharge: HOME | End: 2018-04-12
Payer: MEDICARE

## 2018-04-12 DIAGNOSIS — D69.3 ITP SECONDARY TO INFECTION (HCC): ICD-10-CM

## 2018-04-12 PROCEDURE — 36415 COLL VENOUS BLD VENIPUNCTURE: CPT

## 2018-04-12 PROCEDURE — 85025 COMPLETE CBC W/AUTO DIFF WBC: CPT

## 2018-04-13 LAB
BASOPHILS # BLD AUTO: 0 X10E3/UL (ref 0–0.2)
BASOPHILS NFR BLD AUTO: 1 %
EOSINOPHIL # BLD AUTO: 0.1 X10E3/UL (ref 0–0.4)
EOSINOPHIL NFR BLD AUTO: 2 %
ERYTHROCYTE [DISTWIDTH] IN BLOOD BY AUTOMATED COUNT: 15.2 % (ref 12.3–15.4)
HCT VFR BLD AUTO: 39.2 % (ref 34–46.6)
HGB BLD-MCNC: 12.6 G/DL (ref 11.1–15.9)
IMM GRANULOCYTES # BLD: 0 X10E3/UL (ref 0–0.1)
IMM GRANULOCYTES NFR BLD: 0 %
LYMPHOCYTES # BLD AUTO: 1.6 X10E3/UL (ref 0.7–3.1)
LYMPHOCYTES NFR BLD AUTO: 48 %
MCH RBC QN AUTO: 28.7 PG (ref 26.6–33)
MCHC RBC AUTO-ENTMCNC: 32.1 G/DL (ref 31.5–35.7)
MCV RBC AUTO: 89 FL (ref 79–97)
MONOCYTES # BLD AUTO: 0.2 X10E3/UL (ref 0.1–0.9)
MONOCYTES NFR BLD AUTO: 7 %
NEUTROPHILS # BLD AUTO: 1.4 X10E3/UL (ref 1.4–7)
NEUTROPHILS NFR BLD AUTO: 42 %
PLATELET # BLD AUTO: 260 X10E3/UL (ref 150–379)
RBC # BLD AUTO: 4.39 X10E6/UL (ref 3.77–5.28)
WBC # BLD AUTO: 3.3 X10E3/UL (ref 3.4–10.8)

## 2018-04-17 ENCOUNTER — OFFICE VISIT (OUTPATIENT)
Dept: ONCOLOGY | Age: 83
End: 2018-04-17

## 2018-04-17 VITALS
HEIGHT: 66 IN | RESPIRATION RATE: 20 BRPM | BODY MASS INDEX: 22.98 KG/M2 | WEIGHT: 143 LBS | OXYGEN SATURATION: 98 % | HEART RATE: 58 BPM | DIASTOLIC BLOOD PRESSURE: 62 MMHG | TEMPERATURE: 97.9 F | SYSTOLIC BLOOD PRESSURE: 178 MMHG

## 2018-04-17 DIAGNOSIS — D69.3 ITP SECONDARY TO INFECTION (HCC): Primary | ICD-10-CM

## 2018-04-17 RX ORDER — DANAZOL 200 MG/1
200 CAPSULE ORAL EVERY OTHER DAY
Qty: 90 CAP | Refills: 2 | Status: SHIPPED | OUTPATIENT
Start: 2018-04-17 | End: 2018-04-17 | Stop reason: SDUPTHER

## 2018-04-17 RX ORDER — DANAZOL 200 MG/1
200 CAPSULE ORAL EVERY OTHER DAY
Qty: 90 CAP | Refills: 3 | Status: SHIPPED | OUTPATIENT
Start: 2018-04-17 | End: 2019-04-17 | Stop reason: SDUPTHER

## 2018-04-17 RX ORDER — CIPROFLOXACIN 250 MG/1
TABLET, FILM COATED ORAL
Refills: 0 | COMMUNITY
Start: 2018-03-12 | End: 2021-01-01

## 2018-04-17 NOTE — MR AVS SNAPSHOT
[address] [facility] [address] [place] [address] 
[phone] Patient: Rebekah Robin MRN: [id number] Visit Information Date & Time Provider Department Dept. Phone Encounter #  
 4/17/2018  9:00 AM Sánchez Mccallum MD [facility] Oncology at [address] [facility] [phone] [id number] Follow-up Instructions Return in about 1 year (around 4/17/2019). Follow-up and Disposition History Upcoming Health Maintenance Date Due  
 MenB Meningococcal topic (1 of 2 - Bexsero 2-Dose Series) 7[birth year]26/1945 DTaP/Tdap/Td series (1 - Tdap) 7/26/1958 ZOSTER VACCINE AGE 60> 5/26/1997 GLAUCOMA SCREENING Q2Y 7/26/2002 Bone Densitometry (Dexa) Screening 7/26/2002 Pneumococcal 65+ Low/Medium Risk (1 of 2 - PCV13) 7/26/2002 Influenza Age 5 to Adult 8/1/2017 MEDICARE YEARLY EXAM 3/14/2018 Allergies as of 4/17/2018  Review Complete On: 4/17/2018 By: Sánchez Mccallum MD  
  
 Severity Noted Reaction Type Reactions Aspirin  09/17/2012    Hives Tramadol  09/17/2012    Seizures Current Immunizations  Never Reviewed No immunizations on file. Not reviewed this visit You Were Diagnosed With   
  
 Codes Comments ITP secondary to infection    -  Primary ICD-10-CM: D69.59 ICD-9-CM: 287.49 Vitals BP Pulse Temp Resp Height(growth percentile) Weight(growth percentile)  
 178/62 (!) 58 97.9 °F (36.6 °C) 20 5' 6\" (1.676 m) 143 lb (64.9 kg) SpO2 BMI OB Status Smoking Status 98% 23.08 kg/m2 Postmenopausal Never Smoker Vitals History BMI and BSA Data Body Mass Index Body Surface Area 23.08 kg/m 2 1.74 m 2 Preferred Pharmacy Pharmacy Name Phone [id number] [phone] Your Updated Medication List  
  
   
This list is accurate as of 4/17/18  9:53 AM.  Always use your most recent med list.  
  
  
  
  
 CALCIUM + D PO  
 Take 2 Tabs by mouth daily. 1,200/1,000MG  
  
 chlorhexidine 0.12 % solution Commonly known as:  PERIDEX  
USE AS DIRECTED ON BOTTLE  
  
 ciprofloxacin HCl 250 mg tablet Commonly known as:  CIPRO TAKE 1 TABLET BY MOUTH TWICE A DAY  
  
 citalopram 10 mg tablet Commonly known as:  CELEXA  
  
 danazol 200 mg capsule Commonly known as:  DANOCRINE Take 1 Cap by mouth every other day. FLUZONE HIGH-DOSE 2017-18 (PF) Syrg injection Generic drug:  influenza vaccine 2017-18 (65 yrs+)(PF)  
TO BE ADMINISTERED BY PHARMACIST FOR IMMUNIZATION  
  
 levETIRAcetam 500 mg tablet Commonly known as:  KEPPRA Take 500 mg by mouth daily. lisinopril 20 mg tablet Commonly known as:  PRINIVIL, ZESTRIL  
TAKE 1 TABLET BY MOUTH EVERY DAY Prescriptions Sent to Pharmacy Refills  
 danazol (DANOCRINE) 200 mg capsule 3 Sig: Take 1 Cap by mouth every other day. Class: Normal  
 Pharmacy: [address] Ph #: [phone] Route: Oral  
  
Follow-up Instructions Return in about 1 year (around 4/17/2019). To-Do List   
 As directed Lab:  CBC WITH AUTOMATED DIFF As directed Lab:  METABOLIC PANEL, COMPREHENSIVE Introducing [facility] HEALTH SERVICES! [employer] introduces TurningArt patient portal. Now you can access parts of your medical record, email your doctor's office, and request medication refills online. 1. In your internet browser, go to [email or web address] 2. Click on the First Time User? Click Here link in the Sign In box. You will see the New Member Sign Up page. 3. Enter your TurningArt Access Code exactly as it appears below. You will not need to use this code after youve completed the sign-up process. If you do not sign up before the expiration date, you must request a new code. · TurningArt Access Code: [email or web address] Expires: 7/16/2018  9:41 AM 
 
 4. Enter the last four digits of your Social Security Number (xxxx) and Date of Birth (mm/dd/yyyy) as indicated and click Submit. You will be taken to the next sign-up page. 5. Create a CoupFlip ID. This will be your CoupFlip login ID and cannot be changed, so think of one that is secure and easy to remember. 6. Create a CoupFlip password. You can change your password at any time. 7. Enter your Password Reset Question and Answer. This can be used at a later time if you forget your password. 8. Enter your e-mail address. You will receive e-mail notification when new information is available in [address] 9. Click Sign Up. You can now view and download portions of your medical record. 10. Click the Download Summary menu link to download a portable copy of your medical information. If you have questions, please visit the Frequently Asked Questions section of the CoupFlip website. Remember, CoupFlip is NOT to be used for urgent needs. For medical emergencies, dial 911. Now available from your iPhone and Android! Please provide this summary of care documentation to your next provider. Your primary care clinician is listed as Ravin Rodriguez. If you have any questions after today's visit, please call [phone].

## 2018-04-17 NOTE — PROGRESS NOTES
Hematology follow up    REASON FOR VISIT:  Thrombocytopenia- ITP    HISTORY OF PRESENT ILLNESS: Ms. Irma Ricci is a [de-identified] y.o. female with seizure disorder, anxiety and ITP diagnosed in 2002 who has been following with Dr. Keturah Hays for ITP and established care with us in May 2017. Since her platelets had been normal, offered to stop Danazol which she declined. We then discussed tapering it. She started the taper Aug 2017 and has been stable    Hematologic history    She last saw Dr. Keturah Hays on April 20, 2017. Per records she was diagnosed with ITP in 2002 at which time she was treated with steroids. Her past treatments include WinRho, IVIG, splenectomy. She had been on low-dose danazol for more than 10 years she has had no significant complaints on danazol. .  Last available CBC from 11/7/16 which showed platelet count of 218V, WBC count of 2800  She had CT chest on 5/11/2006 which was notable for abnormally enlarged lymph nodes identified in the right supraclavicular neck measuring 1.8 x 1.7 cm, large confluence measuring 3.7 x 2.4 cm. Left neck adenopathy noted as well measuring 1.8 x 1.2 cm. Thyroid enlargement and nodularity was noted. There was a tiny 4 mm noncalcified nodule in the right middle lobe of the lung. CT abdomen was unremarkable. Numerous groin lymph nodes were noted but they were normal in size and architecture. Patient was taking Danazol 200 mg daily.   .    Started reduced dose Danazol (200 mg every other day on 8/4/17)    Past Medical History:   Diagnosis Date    Hypertension     Seasonal allergic rhinitis     Spleen absent        Past Surgical History:   Procedure Laterality Date    ABDOMEN SURGERY PROC UNLISTED      spleenectomy       Allergies   Allergen Reactions    Aspirin Hives    Tramadol Seizures       Current Outpatient Prescriptions   Medication Sig Dispense Refill    ciprofloxacin HCl (CIPRO) 250 mg tablet TAKE 1 TABLET BY MOUTH TWICE A DAY  0    FLUZONE HIGH-DOSE 2017-18, PF, syrg injection TO BE ADMINISTERED BY PHARMACIST FOR IMMUNIZATION  0    lisinopril (PRINIVIL, ZESTRIL) 20 mg tablet TAKE 1 TABLET BY MOUTH EVERY DAY 90 Tab 3    danazol (DANOCRINE) 200 mg capsule Take 1 Cap by mouth every other day. 90 Cap 2    citalopram (CELEXA) 10 mg tablet       chlorhexidine (PERIDEX) 0.12 % solution USE AS DIRECTED ON BOTTLE  1    CALCIUM CARBONATE/VITAMIN D3 (CALCIUM + D PO) Take 2 Tabs by mouth daily. 1,200/1,000MG      levetiracetam (KEPPRA) 500 mg tablet Take 500 mg by mouth daily. Social History     Social History    Marital status:      Spouse name: N/A    Number of children: N/A    Years of education: N/A     Social History Main Topics    Smoking status: Never Smoker    Smokeless tobacco: Not on file    Alcohol use No    Drug use: No    Sexual activity: Not on file     Other Topics Concern    Not on file     Social History Narrative       Family History   Problem Relation Age of Onset    Coronary Artery Disease Neg Hx        ROS   Patient is a poor historian. Notes that she has not had any recent bleeding. Tolerating Danazol. Denies any lumps or bumps that are new. No belly pain, hematuria, dysuria, fevers, chills, night sweats, weight or appetite changes.  is with her.      ECOG PS is 1      Physical Examination:   Visit Vitals    Ht 5' 6\" (1.676 m)     General appearance - alert, well appearing, and in no distress  Mental status - oriented to person, place, and time  Mouth - mucous membranes moist, pharynx normal without lesions  Chest - clear to auscultation, no wheezes, rales or rhonchi, symmetric air entry  Heart - normal rate, regular rhythm, normal S1, S2, no murmurs, rubs, clicks or gallops  Abdomen - soft, nontender, nondistended, no masses or organomegaly, bowel sounds present  Neurological - normal speech, no focal findings or movement disorder noted    LABS  Lab Results   Component Value Date/Time    WBC 3.3 (L) 04/12/2018 09:42 AM HGB 12.6 04/12/2018 09:42 AM    HCT 39.2 04/12/2018 09:42 AM    PLATELET 454 90/82/5649 09:42 AM    MCV 89 04/12/2018 09:42 AM    ABS. NEUTROPHILS 1.4 04/12/2018 09:42 AM     Lab Results   Component Value Date/Time    Sodium 147 (H) 07/25/2017 08:43 AM    Potassium 4.0 07/25/2017 08:43 AM    Chloride 106 07/25/2017 08:43 AM    CO2 23 07/25/2017 08:43 AM    Glucose 79 07/25/2017 08:43 AM    BUN 14 07/25/2017 08:43 AM    Creatinine 1.18 (H) 07/25/2017 08:43 AM    GFR est AA 51 (L) 07/25/2017 08:43 AM    GFR est non-AA 44 (L) 07/25/2017 08:43 AM    Calcium 9.5 07/25/2017 08:43 AM     Lab Results   Component Value Date/Time    AST (SGOT) 28 07/25/2017 08:43 AM    Alk. phosphatase 66 07/25/2017 08:43 AM    Protein, total 7.0 07/25/2017 08:43 AM    Albumin 4.0 07/25/2017 08:43 AM    Globulin 4.7 (H) 09/17/2012 03:40 PM    A-G Ratio 1.3 07/25/2017 08:43 AM         ASSESSMENT  Ms. Joan Thompson is a [de-identified] y.o. female with history of seizure disorder, anxiety, presumed ITP diagnosed by Dr. Mendy Vizcarra  status post splenectomy in 2002. Prior treatments have included WinRho, steroids, IVIG. She had a partial response after splenectomy and has since been on danazol that she says she is tolerating well. She has had normal platelet count for years on Danazol. Declined stopping the drug and started to taper on 8/4/17. Platelets remain normal on reduced dose of 200 mg every other day      DISCUSSION  Patient has been on Danazol for over 10 years now with normal platelet counts. Discussed that ITP does not need treatment for platelets over 04-81U. Tolerating reduced dose at every other day dosing with stable counts and no toxicities. They do not have a good understanding of the disease, despite multiple counseling attempts. Decline coming off the drug    PLAN  - Danazol 200 mg every other day - refilled  - CBC diff every 6 months and see me in 1 year      Sánchez Mccallum MD    Ms. Joan Thompson has a reminder for a \"due or due soon\" health maintenance. I have asked that she contact her primary care provider for follow-up on this health maintenance.

## 2018-07-12 ENCOUNTER — HOSPITAL ENCOUNTER (OUTPATIENT)
Dept: MRI IMAGING | Age: 83
Discharge: HOME | End: 2018-07-12
Attending: OTOLARYNGOLOGY
Payer: MEDICARE

## 2018-07-12 VITALS — BODY MASS INDEX: 24.21 KG/M2 | WEIGHT: 150 LBS

## 2018-07-12 DIAGNOSIS — H90.3 ASNHL (ASYMMETRICAL SENSORINEURAL HEARING LOSS): ICD-10-CM

## 2018-07-12 PROCEDURE — 74011250636 HC RX REV CODE- 250/636: Performed by: OTOLARYNGOLOGY

## 2018-07-12 PROCEDURE — A9575 INJ GADOTERATE MEGLUMI 0.1ML: HCPCS | Performed by: OTOLARYNGOLOGY

## 2018-07-12 PROCEDURE — 70553 MRI BRAIN STEM W/O & W/DYE: CPT

## 2018-07-12 RX ORDER — GADOTERATE MEGLUMINE 376.9 MG/ML
13 INJECTION INTRAVENOUS
Status: COMPLETED | OUTPATIENT
Start: 2018-07-12 | End: 2018-07-12

## 2018-07-12 RX ADMIN — GADOTERATE MEGLUMINE 13 ML: 376.9 INJECTION INTRAVENOUS at 11:00

## 2019-03-10 DIAGNOSIS — I10 ESSENTIAL HYPERTENSION: ICD-10-CM

## 2019-03-11 RX ORDER — LISINOPRIL 20 MG/1
TABLET ORAL
Qty: 90 TAB | Refills: 3 | OUTPATIENT
Start: 2019-03-11

## 2019-04-09 LAB
ALBUMIN SERPL-MCNC: 4.3 G/DL (ref 3.5–4.7)
ALBUMIN/GLOB SERPL: 1.4 {RATIO} (ref 1.2–2.2)
ALP SERPL-CCNC: 82 IU/L (ref 39–117)
ALT SERPL-CCNC: 21 IU/L (ref 0–32)
AST SERPL-CCNC: 27 IU/L (ref 0–40)
BASOPHILS # BLD AUTO: 0 X10E3/UL (ref 0–0.2)
BASOPHILS NFR BLD AUTO: 1 %
BILIRUB SERPL-MCNC: 0.5 MG/DL (ref 0–1.2)
BUN SERPL-MCNC: 11 MG/DL (ref 8–27)
BUN/CREAT SERPL: 10 (ref 12–28)
CALCIUM SERPL-MCNC: 9.8 MG/DL (ref 8.7–10.3)
CHLORIDE SERPL-SCNC: 106 MMOL/L (ref 96–106)
CO2 SERPL-SCNC: 25 MMOL/L (ref 20–29)
CREAT SERPL-MCNC: 1.11 MG/DL (ref 0.57–1)
EOSINOPHIL # BLD AUTO: 0.1 X10E3/UL (ref 0–0.4)
EOSINOPHIL NFR BLD AUTO: 4 %
ERYTHROCYTE [DISTWIDTH] IN BLOOD BY AUTOMATED COUNT: 15.6 % (ref 12.3–15.4)
GLOBULIN SER CALC-MCNC: 3.1 G/DL (ref 1.5–4.5)
GLUCOSE SERPL-MCNC: 75 MG/DL (ref 65–99)
HCT VFR BLD AUTO: 39.6 % (ref 34–46.6)
HGB BLD-MCNC: 13.2 G/DL (ref 11.1–15.9)
IMM GRANULOCYTES # BLD AUTO: 0 X10E3/UL (ref 0–0.1)
IMM GRANULOCYTES NFR BLD AUTO: 0 %
LYMPHOCYTES # BLD AUTO: 1.4 X10E3/UL (ref 0.7–3.1)
LYMPHOCYTES NFR BLD AUTO: 44 %
MCH RBC QN AUTO: 29.7 PG (ref 26.6–33)
MCHC RBC AUTO-ENTMCNC: 33.3 G/DL (ref 31.5–35.7)
MCV RBC AUTO: 89 FL (ref 79–97)
MONOCYTES # BLD AUTO: 0.3 X10E3/UL (ref 0.1–0.9)
MONOCYTES NFR BLD AUTO: 11 %
NEUTROPHILS # BLD AUTO: 1.2 X10E3/UL (ref 1.4–7)
NEUTROPHILS NFR BLD AUTO: 40 %
PLATELET # BLD AUTO: 270 X10E3/UL (ref 150–379)
POTASSIUM SERPL-SCNC: 4.3 MMOL/L (ref 3.5–5.2)
PROT SERPL-MCNC: 7.4 G/DL (ref 6–8.5)
RBC # BLD AUTO: 4.44 X10E6/UL (ref 3.77–5.28)
SODIUM SERPL-SCNC: 146 MMOL/L (ref 134–144)
WBC # BLD AUTO: 3.1 X10E3/UL (ref 3.4–10.8)

## 2019-04-17 ENCOUNTER — OFFICE VISIT (OUTPATIENT)
Dept: ONCOLOGY | Age: 84
End: 2019-04-17

## 2019-04-17 VITALS
OXYGEN SATURATION: 97 % | RESPIRATION RATE: 13 BRPM | WEIGHT: 145 LBS | BODY MASS INDEX: 23.3 KG/M2 | HEIGHT: 66 IN | TEMPERATURE: 98 F | HEART RATE: 65 BPM | DIASTOLIC BLOOD PRESSURE: 67 MMHG | SYSTOLIC BLOOD PRESSURE: 154 MMHG

## 2019-04-17 DIAGNOSIS — D69.3 ITP SECONDARY TO INFECTION (HCC): Primary | ICD-10-CM

## 2019-04-17 DIAGNOSIS — D70.9 NEUTROPENIA, UNSPECIFIED TYPE (HCC): ICD-10-CM

## 2019-04-17 RX ORDER — DANAZOL 200 MG/1
200 CAPSULE ORAL EVERY OTHER DAY
Qty: 90 CAP | Refills: 3 | Status: SHIPPED | OUTPATIENT
Start: 2019-04-17 | End: 2020-01-01 | Stop reason: SDUPTHER

## 2019-04-17 RX ORDER — AMLODIPINE BESYLATE 2.5 MG/1
TABLET ORAL
Refills: 0 | COMMUNITY
Start: 2019-04-02

## 2019-04-17 NOTE — PROGRESS NOTES
Suzanne Grmorganbyron is a 82 y.o. female    Room 5    Chief Complaint   Patient presents with    Other     ITP follow up    Medication Refill     danazol

## 2019-04-17 NOTE — PROGRESS NOTES
Hematology follow up    REASON FOR VISIT:  Thrombocytopenia- ITP    HISTORY OF PRESENT ILLNESS: Ms. Mari Castro is a 82 y.o. female with seizure disorder, anxiety and ITP diagnosed in 2002 who has been following with Dr. Fauzia Crane for ITP and established care with us in May 2017. Since her platelets had been normal, offered to stop Danazol which she declined. We then discussed tapering it. She started the taper Aug 2017 and has been stable    Hematologic history    She last saw Dr. Fauzia Crane on April 20, 2017. Per records she was diagnosed with ITP in 2002 at which time she was treated with steroids. Her past treatments include WinRho, IVIG, splenectomy. She had been on low-dose danazol for more than 10 years she has had no significant complaints on danazol. .  Last available CBC from 11/7/16 which showed platelet count of 961V, WBC count of 2800  She had CT chest on 5/11/2006 which was notable for abnormally enlarged lymph nodes identified in the right supraclavicular neck measuring 1.8 x 1.7 cm, large confluence measuring 3.7 x 2.4 cm. Left neck adenopathy noted as well measuring 1.8 x 1.2 cm. Thyroid enlargement and nodularity was noted. There was a tiny 4 mm noncalcified nodule in the right middle lobe of the lung. CT abdomen was unremarkable. Numerous groin lymph nodes were noted but they were normal in size and architecture. Patient was taking Danazol 200 mg daily. .    Started reduced dose Danazol (200 mg every other day on 8/4/17)    Past Medical History:   Diagnosis Date    Hypertension     Seasonal allergic rhinitis     Spleen absent        Past Surgical History:   Procedure Laterality Date    ABDOMEN SURGERY PROC UNLISTED      spleenectomy       Allergies   Allergen Reactions    Aspirin Hives    Tramadol Seizures       Current Outpatient Medications   Medication Sig Dispense Refill    danazol (DANOCRINE) 200 mg capsule Take 1 Cap by mouth every other day.  90 Cap 3    lisinopril (PRINIVIL, ZESTRIL) 20 mg tablet TAKE 1 TABLET BY MOUTH EVERY DAY 90 Tab 3    chlorhexidine (PERIDEX) 0.12 % solution USE AS DIRECTED ON BOTTLE  1    CALCIUM CARBONATE/VITAMIN D3 (CALCIUM + D PO) Take 2 Tabs by mouth daily. 1,200/1,000MG      levetiracetam (KEPPRA) 500 mg tablet Take 500 mg by mouth daily.  amLODIPine (NORVASC) 2.5 mg tablet TAKE 1 TABLET ONCE BY MOUTH EVERY DAY  0    ciprofloxacin HCl (CIPRO) 250 mg tablet TAKE 1 TABLET BY MOUTH TWICE A DAY  0    FLUZONE HIGH-DOSE 2017-18, PF, syrg injection TO BE ADMINISTERED BY PHARMACIST FOR IMMUNIZATION  0    citalopram (CELEXA) 10 mg tablet          Social History     Socioeconomic History    Marital status:      Spouse name: Not on file    Number of children: Not on file    Years of education: Not on file    Highest education level: Not on file   Tobacco Use    Smoking status: Never Smoker    Smokeless tobacco: Never Used   Substance and Sexual Activity    Alcohol use: No     Alcohol/week: 0.0 oz    Drug use: No       Family History   Problem Relation Age of Onset    Coronary Artery Disease Neg Hx        ROS   Patient is a poor historian. Notes that she has not had any recent bleeding. Tolerating Danazol. Denies any lumps or bumps that are new. She does say that she had a tooth pulled and bled more than expected. No belly pain, hematuria, dysuria, fevers, chills, night sweats, weight or appetite changes.  is with her.      ECOG PS is 1      Physical Examination:   Visit Vitals  /67 (BP 1 Location: Right arm, BP Patient Position: Sitting)   Pulse 65   Temp 98 °F (36.7 °C) (Oral)   Resp 13   Ht 5' 6\" (1.676 m)   Wt 145 lb (65.8 kg)   SpO2 97%   BMI 23.40 kg/m²     General appearance - alert, well appearing, and in no distress  Mental status - oriented to person, place, and time  Mouth - mucous membranes moist, pharynx normal without lesions  Chest - clear to auscultation, no wheezes, rales or rhonchi, symmetric air entry  Heart - normal rate, regular rhythm, normal S1, S2, no murmurs, rubs, clicks or gallops  Abdomen - soft, nontender, nondistended, no masses or organomegaly, bowel sounds present  Neurological - normal speech, no focal findings or movement disorder noted    LABS  Lab Results   Component Value Date/Time    WBC 3.1 (L) 04/08/2019 10:26 AM    HGB 13.2 04/08/2019 10:26 AM    HCT 39.6 04/08/2019 10:26 AM    PLATELET 364 14/90/1224 10:26 AM    MCV 89 04/08/2019 10:26 AM    ABS. NEUTROPHILS 1.2 (L) 04/08/2019 10:26 AM     Lab Results   Component Value Date/Time    Sodium 146 (H) 04/08/2019 10:26 AM    Potassium 4.3 04/08/2019 10:26 AM    Chloride 106 04/08/2019 10:26 AM    CO2 25 04/08/2019 10:26 AM    Glucose 75 04/08/2019 10:26 AM    BUN 11 04/08/2019 10:26 AM    Creatinine 1.11 (H) 04/08/2019 10:26 AM    GFR est AA 54 (L) 04/08/2019 10:26 AM    GFR est non-AA 47 (L) 04/08/2019 10:26 AM    Calcium 9.8 04/08/2019 10:26 AM     Lab Results   Component Value Date/Time    AST (SGOT) 27 04/08/2019 10:26 AM    Alk. phosphatase 82 04/08/2019 10:26 AM    Protein, total 7.4 04/08/2019 10:26 AM    Albumin 4.3 04/08/2019 10:26 AM    Globulin 4.7 (H) 09/17/2012 03:40 PM    A-G Ratio 1.4 04/08/2019 10:26 AM         ASSESSMENT  Ms. Mari Castro is a 82 y.o. female with history of seizure disorder, anxiety, presumed ITP diagnosed by Dr. Cage Cure  status post splenectomy in 2002. Prior treatments have included WinRho, steroids, IVIG. She had a partial response after splenectomy and has since been on danazol that she says she is tolerating well. She has had normal platelet count for years on Danazol. Declined stopping the drug and started to taper on 8/4/17. Platelets remain normal on reduced dose of 200 mg every other day      DISCUSSION  Patient has been on Danazol for over 10 years now with normal platelet counts. Discussed that ITP does not need treatment for platelets over 28-77I.  Tolerating reduced dose at every other day dosing with stable counts and no toxicities. They do not have a good understanding of the disease, despite multiple counseling attempts. Decline coming off the drug again    She is noted to have new mild leucopenia x 1 year  Danazol could cause leucopenia  Discussed other possibilities. I would like to re recheck her CBC in 3 months and if ANC is declining will again attempt to take her off Danazol, if no improvement still will need further evaluation    PLAN  - Danazol 200 mg every other day - refilled  - CBC diff in 3 months       Low Pace MD    Ms. Catherine Buchanan has a reminder for a \"due or due soon\" health maintenance. I have asked that she contact her primary care provider for follow-up on this health maintenance.

## 2019-07-17 DIAGNOSIS — D69.3 ITP SECONDARY TO INFECTION (HCC): ICD-10-CM

## 2019-08-13 ENCOUNTER — HOSPITAL ENCOUNTER (OUTPATIENT)
Dept: LAB | Age: 84
Discharge: HOME | End: 2019-08-13
Payer: MEDICARE

## 2019-08-13 PROCEDURE — 85025 COMPLETE CBC W/AUTO DIFF WBC: CPT

## 2019-08-13 PROCEDURE — 36415 COLL VENOUS BLD VENIPUNCTURE: CPT

## 2019-08-14 LAB
BASOPHILS # BLD AUTO: 0.1 X10E3/UL (ref 0–0.2)
BASOPHILS NFR BLD AUTO: 1 %
EOSINOPHIL # BLD AUTO: 0.1 X10E3/UL (ref 0–0.4)
EOSINOPHIL NFR BLD AUTO: 2 %
ERYTHROCYTE [DISTWIDTH] IN BLOOD BY AUTOMATED COUNT: 13.9 % (ref 12.3–15.4)
HCT VFR BLD AUTO: 40.3 % (ref 34–46.6)
HGB BLD-MCNC: 13.1 G/DL (ref 11.1–15.9)
IMM GRANULOCYTES # BLD AUTO: 0 X10E3/UL (ref 0–0.1)
IMM GRANULOCYTES NFR BLD AUTO: 0 %
LYMPHOCYTES # BLD AUTO: 1.7 X10E3/UL (ref 0.7–3.1)
LYMPHOCYTES NFR BLD AUTO: 37 %
MCH RBC QN AUTO: 29 PG (ref 26.6–33)
MCHC RBC AUTO-ENTMCNC: 32.5 G/DL (ref 31.5–35.7)
MCV RBC AUTO: 89 FL (ref 79–97)
MONOCYTES # BLD AUTO: 0.6 X10E3/UL (ref 0.1–0.9)
MONOCYTES NFR BLD AUTO: 14 %
NEUTROPHILS # BLD AUTO: 2.1 X10E3/UL (ref 1.4–7)
NEUTROPHILS NFR BLD AUTO: 46 %
PLATELET # BLD AUTO: 269 X10E3/UL (ref 150–450)
RBC # BLD AUTO: 4.51 X10E6/UL (ref 3.77–5.28)
WBC # BLD AUTO: 4.6 X10E3/UL (ref 3.4–10.8)

## 2019-08-21 ENCOUNTER — OFFICE VISIT (OUTPATIENT)
Dept: ONCOLOGY | Age: 84
End: 2019-08-21

## 2019-08-21 VITALS
BODY MASS INDEX: 23.3 KG/M2 | OXYGEN SATURATION: 96 % | HEART RATE: 68 BPM | DIASTOLIC BLOOD PRESSURE: 76 MMHG | TEMPERATURE: 98.6 F | SYSTOLIC BLOOD PRESSURE: 156 MMHG | HEIGHT: 66 IN | WEIGHT: 145 LBS

## 2019-08-21 DIAGNOSIS — D69.3 ITP SECONDARY TO INFECTION (HCC): Primary | ICD-10-CM

## 2019-08-21 RX ORDER — CHOLECALCIFEROL (VITAMIN D3) 125 MCG
CAPSULE ORAL
COMMUNITY
End: 2021-01-01

## 2019-08-21 NOTE — PROGRESS NOTES
Hematology follow up    REASON FOR VISIT:  Thrombocytopenia- ITP    HISTORY OF PRESENT ILLNESS: Ms. Ly Hamilton is a 82 y.o. female with seizure disorder, anxiety and ITP diagnosed in 2002 who has been following with Dr. Shahnaz Sharif for ITP and established care with us in May 2017. Since her platelets had been normal, offered to stop Danazol which she declined. We then discussed tapering it. She started the taper Aug 2017 and has been stable. She discontinued Danazol in Jan 2019. Hematologic history    She last saw Dr. Shahnaz Sharif on April 20, 2017. Per records she was diagnosed with ITP in 2002 at which time she was treated with steroids. Her past treatments include WinRho, IVIG, splenectomy. She had been on low-dose danazol for more than 10 years she has had no significant complaints on danazol. .  Last available CBC from 11/7/16 which showed platelet count of 691L, WBC count of 2800  She had CT chest on 5/11/2006 which was notable for abnormally enlarged lymph nodes identified in the right supraclavicular neck measuring 1.8 x 1.7 cm, large confluence measuring 3.7 x 2.4 cm. Left neck adenopathy noted as well measuring 1.8 x 1.2 cm. Thyroid enlargement and nodularity was noted. There was a tiny 4 mm noncalcified nodule in the right middle lobe of the lung. CT abdomen was unremarkable. Numerous groin lymph nodes were noted but they were normal in size and architecture. Patient was taking Danazol 200 mg daily. .    Started reduced dose Danazol (200 mg every other day on 8/4/17)    Past Medical History:   Diagnosis Date    Hypertension     Seasonal allergic rhinitis     Spleen absent        Past Surgical History:   Procedure Laterality Date    ABDOMEN SURGERY PROC UNLISTED      spleenectomy       Allergies   Allergen Reactions    Aspirin Hives    Tramadol Seizures       Current Outpatient Medications   Medication Sig Dispense Refill    naproxen sodium (ALEVE) 220 mg cap Take  by mouth.       amLODIPine (NORVASC) 2.5 mg tablet TAKE 1 TABLET ONCE BY MOUTH EVERY DAY  0    danazol (DANOCRINE) 200 mg capsule Take 1 Cap by mouth every other day. 90 Cap 3    lisinopril (PRINIVIL, ZESTRIL) 20 mg tablet TAKE 1 TABLET BY MOUTH EVERY DAY 90 Tab 3    citalopram (CELEXA) 10 mg tablet       levetiracetam (KEPPRA) 500 mg tablet Take 500 mg by mouth daily.  ciprofloxacin HCl (CIPRO) 250 mg tablet TAKE 1 TABLET BY MOUTH TWICE A DAY  0    FLUZONE HIGH-DOSE 2017-18, PF, syrg injection TO BE ADMINISTERED BY PHARMACIST FOR IMMUNIZATION  0    chlorhexidine (PERIDEX) 0.12 % solution USE AS DIRECTED ON BOTTLE  1    CALCIUM CARBONATE/VITAMIN D3 (CALCIUM + D PO) Take 2 Tabs by mouth daily. 1,200/1,000MG         Social History     Socioeconomic History    Marital status:      Spouse name: Not on file    Number of children: Not on file    Years of education: Not on file    Highest education level: Not on file   Tobacco Use    Smoking status: Never Smoker    Smokeless tobacco: Never Used   Substance and Sexual Activity    Alcohol use: No     Alcohol/week: 0.0 standard drinks    Drug use: No       Family History   Problem Relation Age of Onset    Coronary Artery Disease Neg Hx        ROS  Patient is a poor historian. Notes that she has not had any recent bleeding. Discontinued Danazol in Jan 2019. Denies any lumps or bumps that are new. No belly pain, hematuria, dysuria, fevers, chills, night sweats, weight or appetite changes.      ECOG PS is 1      Physical Examination:   Visit Vitals  /76   Pulse 68   Temp 98.6 °F (37 °C)   Ht 5' 6\" (1.676 m)   Wt 145 lb (65.8 kg)   SpO2 96%   BMI 23.40 kg/m²     General appearance - alert, well appearing, and in no distress  Mental status - oriented to person, place, and time  Mouth - mucous membranes moist, pharynx normal without lesions  Chest - clear to auscultation, no wheezes, rales or rhonchi, symmetric air entry  Heart - normal rate, regular rhythm, normal S1, S2, no murmurs, rubs, clicks or gallops  Abdomen - soft, nontender, nondistended, no masses or organomegaly, bowel sounds present  Neurological - normal speech, no focal findings or movement disorder noted    LABS  Lab Results   Component Value Date/Time    WBC 4.6 08/13/2019 08:29 AM    HGB 13.1 08/13/2019 08:29 AM    HCT 40.3 08/13/2019 08:29 AM    PLATELET 730 90/62/8531 08:29 AM    MCV 89 08/13/2019 08:29 AM    ABS. NEUTROPHILS 2.1 08/13/2019 08:29 AM     Lab Results   Component Value Date/Time    Sodium 146 (H) 04/08/2019 10:26 AM    Potassium 4.3 04/08/2019 10:26 AM    Chloride 106 04/08/2019 10:26 AM    CO2 25 04/08/2019 10:26 AM    Glucose 75 04/08/2019 10:26 AM    BUN 11 04/08/2019 10:26 AM    Creatinine 1.11 (H) 04/08/2019 10:26 AM    GFR est AA 54 (L) 04/08/2019 10:26 AM    GFR est non-AA 47 (L) 04/08/2019 10:26 AM    Calcium 9.8 04/08/2019 10:26 AM     Lab Results   Component Value Date/Time    AST (SGOT) 27 04/08/2019 10:26 AM    Alk. phosphatase 82 04/08/2019 10:26 AM    Protein, total 7.4 04/08/2019 10:26 AM    Albumin 4.3 04/08/2019 10:26 AM    Globulin 4.7 (H) 09/17/2012 03:40 PM    A-G Ratio 1.4 04/08/2019 10:26 AM         ASSESSMENT  Ms. Christopher Morris is a 82 y.o. female with history of seizure disorder, anxiety, presumed ITP diagnosed by Dr. Curlee Galeazzi  status post splenectomy in 2002. Prior treatments have included WinRho, steroids, IVIG. She had a partial response after splenectomy and has since been on danazol that she says she is tolerating well. She has had normal platelet count for years on Danazol. Declined stopping the drug and started to taper on 8/4/17. Platelets remain normal on reduced dose of 200 mg every other day      DISCUSSION  Patient has been on Danazol for over 10 years with normal platelet counts and hence was tapered off ny Jan 2019. Her platelets continue to remain normal      We will continue observation. Discussed that ITP does not need treatment for platelets over 61-46E. They do not have a good understanding of the disease, despite multiple counseling attempts. PLAN  - CBC diff every 6 months , call with bleeding    RTC 1 year      Berlin Joradn MD    Ms. Lin Telles has a reminder for a \"due or due soon\" health maintenance. I have asked that she contact her primary care provider for follow-up on this health maintenance.

## 2019-08-21 NOTE — PROGRESS NOTES
----- Message from VARSHA Liu sent at 1/20/2017  1:31 PM CST -----  Please let her care giver know that she doesn't need the flagyl but she does need diflucan 150mg po today & repeat in 4 days, #2 with no refills.   Julius Raygoza is a 82 y.o. female  Chief Complaint   Patient presents with    Thrombocytopenia     follow up      1. Have you been to the ER, urgent care clinic since your last visit? Hospitalized since your last visit?no     2. Have you seen or consulted any other health care providers outside of the [address] [place] [address] since your last visit?     No

## 2020-01-01 ENCOUNTER — HOSPITAL ENCOUNTER (OUTPATIENT)
Dept: LAB | Age: 85
Discharge: HOME | End: 2020-08-17
Payer: MEDICARE

## 2020-01-01 ENCOUNTER — OFFICE VISIT (OUTPATIENT)
Dept: ONCOLOGY | Age: 85
End: 2020-01-01
Payer: MEDICARE

## 2020-01-01 VITALS
DIASTOLIC BLOOD PRESSURE: 66 MMHG | OXYGEN SATURATION: 98 % | TEMPERATURE: 97.5 F | HEART RATE: 69 BPM | WEIGHT: 132.6 LBS | BODY MASS INDEX: 21.31 KG/M2 | HEIGHT: 66 IN | SYSTOLIC BLOOD PRESSURE: 136 MMHG

## 2020-01-01 DIAGNOSIS — D69.3 ITP SECONDARY TO INFECTION (HCC): Primary | ICD-10-CM

## 2020-01-01 LAB
ALBUMIN SERPL-MCNC: 4.3 G/DL (ref 3.6–4.6)
ALBUMIN/GLOB SERPL: 1.3 {RATIO} (ref 1.2–2.2)
ALP SERPL-CCNC: 83 IU/L (ref 39–117)
ALT SERPL-CCNC: 19 IU/L (ref 0–32)
AST SERPL-CCNC: 25 IU/L (ref 0–40)
BASOPHILS # BLD AUTO: 0.1 X10E3/UL (ref 0–0.2)
BASOPHILS NFR BLD AUTO: 2 %
BILIRUB SERPL-MCNC: 0.3 MG/DL (ref 0–1.2)
BUN SERPL-MCNC: 19 MG/DL (ref 8–27)
BUN/CREAT SERPL: 14 (ref 12–28)
CALCIUM SERPL-MCNC: 9.5 MG/DL (ref 8.7–10.3)
CHLORIDE SERPL-SCNC: 102 MMOL/L (ref 96–106)
CO2 SERPL-SCNC: 25 MMOL/L (ref 20–29)
CREAT SERPL-MCNC: 1.4 MG/DL (ref 0.57–1)
EOSINOPHIL # BLD AUTO: 0.1 X10E3/UL (ref 0–0.4)
EOSINOPHIL NFR BLD AUTO: 2 %
ERYTHROCYTE [DISTWIDTH] IN BLOOD BY AUTOMATED COUNT: 13.9 % (ref 11.7–15.4)
GLOBULIN SER CALC-MCNC: 3.2 G/DL (ref 1.5–4.5)
GLUCOSE SERPL-MCNC: 77 MG/DL (ref 65–99)
HCT VFR BLD AUTO: 42.6 % (ref 34–46.6)
HGB BLD-MCNC: 13.6 G/DL (ref 11.1–15.9)
IMM GRANULOCYTES # BLD AUTO: 0 X10E3/UL (ref 0–0.1)
IMM GRANULOCYTES NFR BLD AUTO: 0 %
LYMPHOCYTES # BLD AUTO: 1.7 X10E3/UL (ref 0.7–3.1)
LYMPHOCYTES NFR BLD AUTO: 44 %
MCH RBC QN AUTO: 29 PG (ref 26.6–33)
MCHC RBC AUTO-ENTMCNC: 31.9 G/DL (ref 31.5–35.7)
MCV RBC AUTO: 91 FL (ref 79–97)
MONOCYTES # BLD AUTO: 0.5 X10E3/UL (ref 0.1–0.9)
MONOCYTES NFR BLD AUTO: 12 %
NEUTROPHILS # BLD AUTO: 1.6 X10E3/UL (ref 1.4–7)
NEUTROPHILS NFR BLD AUTO: 40 %
PATH REV BLD -IMP: NORMAL
PATHOLOGIST NAME: NORMAL
PLATELET # BLD AUTO: 276 X10E3/UL (ref 150–450)
POTASSIUM SERPL-SCNC: 3.9 MMOL/L (ref 3.5–5.2)
PROT SERPL-MCNC: 7.5 G/DL (ref 6–8.5)
RBC # BLD AUTO: 4.69 X10E6/UL (ref 3.77–5.28)
SODIUM SERPL-SCNC: 143 MMOL/L (ref 134–144)
VIT B12 SERPL-MCNC: 704 PG/ML (ref 232–1245)
WBC # BLD AUTO: 3.9 X10E3/UL (ref 3.4–10.8)

## 2020-01-01 PROCEDURE — 36415 COLL VENOUS BLD VENIPUNCTURE: CPT

## 2020-01-01 PROCEDURE — 1101F PT FALLS ASSESS-DOCD LE1/YR: CPT | Performed by: INTERNAL MEDICINE

## 2020-01-01 PROCEDURE — G8420 CALC BMI NORM PARAMETERS: HCPCS | Performed by: INTERNAL MEDICINE

## 2020-01-01 PROCEDURE — 1090F PRES/ABSN URINE INCON ASSESS: CPT | Performed by: INTERNAL MEDICINE

## 2020-01-01 PROCEDURE — 99213 OFFICE O/P EST LOW 20 MIN: CPT | Performed by: INTERNAL MEDICINE

## 2020-01-01 PROCEDURE — 82607 VITAMIN B-12: CPT

## 2020-01-01 PROCEDURE — G8536 NO DOC ELDER MAL SCRN: HCPCS | Performed by: INTERNAL MEDICINE

## 2020-01-01 PROCEDURE — G8400 PT W/DXA NO RESULTS DOC: HCPCS | Performed by: INTERNAL MEDICINE

## 2020-01-01 PROCEDURE — G8427 DOCREV CUR MEDS BY ELIG CLIN: HCPCS | Performed by: INTERNAL MEDICINE

## 2020-01-01 PROCEDURE — G0463 HOSPITAL OUTPT CLINIC VISIT: HCPCS | Performed by: INTERNAL MEDICINE

## 2020-01-01 PROCEDURE — G8510 SCR DEP NEG, NO PLAN REQD: HCPCS | Performed by: INTERNAL MEDICINE

## 2020-01-01 PROCEDURE — 80053 COMPREHEN METABOLIC PANEL: CPT

## 2020-01-01 RX ORDER — DANAZOL 200 MG/1
200 CAPSULE ORAL EVERY OTHER DAY
Qty: 90 CAP | Refills: 3 | Status: SHIPPED | OUTPATIENT
Start: 2020-01-01

## 2020-01-01 RX ORDER — DANAZOL 200 MG/1
200 CAPSULE ORAL EVERY OTHER DAY
Qty: 90 CAP | Refills: 3 | Status: SHIPPED | OUTPATIENT
Start: 2020-01-01 | End: 2020-01-01 | Stop reason: SDUPTHER

## 2020-01-01 RX ORDER — HYDROCHLOROTHIAZIDE 25 MG/1
25 TABLET ORAL DAILY
COMMUNITY
End: 2021-01-01

## 2020-08-21 NOTE — PROGRESS NOTES
Hematology follow up REASON FOR VISIT:  Thrombocytopenia- ITP 
 
HISTORY OF PRESENT ILLNESS: Ms. Natasha Hahn is a 82 y.o. female with seizure disorder, anxiety and ITP diagnosed in 2002 who has been following with Dr. Graham Barragan for ITP and established care with us in May 2017. Since her platelets had been normal, offered to stop Danazol which she declined. We then discussed tapering it. She started the taper Aug 2017 but refused to stop it. Continues on it Hematologic history She last saw Dr. Graham Barragan on April 20, 2017. Per records she was diagnosed with ITP in 2002 at which time she was treated with steroids. Her past treatments include WinRho, IVIG, splenectomy. She had been on low-dose danazol for more than 10 years she has had no significant complaints on danazol. .  Last available CBC from 11/7/16 which showed platelet count of 968I, WBC count of 2800 She had CT chest on 5/11/2006 which was notable for abnormally enlarged lymph nodes identified in the right supraclavicular neck measuring 1.8 x 1.7 cm, large confluence measuring 3.7 x 2.4 cm. Left neck adenopathy noted as well measuring 1.8 x 1.2 cm. Thyroid enlargement and nodularity was noted. There was a tiny 4 mm noncalcified nodule in the right middle lobe of the lung. CT abdomen was unremarkable. Numerous groin lymph nodes were noted but they were normal in size and architecture. Patient was taking Danazol 200 mg daily. . 
 
Started reduced dose Danazol (200 mg every other day on 8/4/17) Past Medical History:  
Diagnosis Date  Hypertension  Seasonal allergic rhinitis  Spleen absent Past Surgical History:  
Procedure Laterality Date  ABDOMEN SURGERY PROC UNLISTED    
 spleenectomy Allergies Allergen Reactions  Aspirin Hives  Tramadol Seizures Current Outpatient Medications Medication Sig Dispense Refill  hydroCHLOROthiazide (HYDRODIURIL) 25 mg tablet Take 25 mg by mouth daily.  danazoL (DANOCRINE) 200 mg capsule Take 1 Cap by mouth every other day. 90 Cap 3  
 naproxen sodium (ALEVE) 220 mg cap Take  by mouth.  amLODIPine (NORVASC) 2.5 mg tablet TAKE 1 TABLET ONCE BY MOUTH EVERY DAY  0  
 FLUZONE HIGH-DOSE 2017-18, PF, syrg injection TO BE ADMINISTERED BY PHARMACIST FOR IMMUNIZATION  0  
 lisinopril (PRINIVIL, ZESTRIL) 20 mg tablet TAKE 1 TABLET BY MOUTH EVERY DAY 90 Tab 3  
 levetiracetam (KEPPRA) 500 mg tablet Take 500 mg by mouth daily.  ciprofloxacin HCl (CIPRO) 250 mg tablet TAKE 1 TABLET BY MOUTH TWICE A DAY  0  
 citalopram (CELEXA) 10 mg tablet  chlorhexidine (PERIDEX) 0.12 % solution USE AS DIRECTED ON BOTTLE  1  
 CALCIUM CARBONATE/VITAMIN D3 (CALCIUM + D PO) Take 2 Tabs by mouth daily. 1,200/1,000MG Social History Socioeconomic History  Marital status:  Spouse name: Not on file  Number of children: Not on file  Years of education: Not on file  Highest education level: Not on file Tobacco Use  Smoking status: Never Smoker  Smokeless tobacco: Never Used Substance and Sexual Activity  Alcohol use: No  
  Alcohol/week: 0.0 standard drinks  Drug use: No  
 
 
Family History Problem Relation Age of Onset  Coronary Artery Disease Neg Hx ROS Patient is a poor historian. Comes with son will today. She has some easy bruising, she has no other bleeding. HTN has been an issue. Denies any lumps or bumps that are new. No belly pain, hematuria, dysuria, fevers, chills, night sweats, weight or appetite changes. ECOG PS is 1 Physical Examination:  
Visit Vitals /66 (BP 1 Location: Left arm, BP Patient Position: Sitting) Pulse 69 Temp 97.5 °F (36.4 °C) (Temporal) Ht 5' 6\" (1.676 m) Wt 132 lb 9.6 oz (60.1 kg) SpO2 98% BMI 21.40 kg/m² General appearance - alert, well appearing, and in no distress Mental status - oriented to person, place, and time Mouth - mucous membranes moist, pharynx normal without lesions Abdomen - soft, nontender, nondistended, no masses or organomegaly, bowel sounds present Neurological - normal speech, no focal findings or movement disorder noted LABS Lab Results Component Value Date/Time WBC 3.9 08/17/2020 12:11 PM  
 HGB 13.6 08/17/2020 12:11 PM  
 HCT 42.6 08/17/2020 12:11 PM  
 PLATELET 431 70/85/3765 12:11 PM  
 MCV 91 08/17/2020 12:11 PM  
 ABS. NEUTROPHILS 1.6 08/17/2020 12:11 PM  
 
Lab Results Component Value Date/Time Sodium 143 08/17/2020 12:11 PM  
 Potassium 3.9 08/17/2020 12:11 PM  
 Chloride 102 08/17/2020 12:11 PM  
 CO2 25 08/17/2020 12:11 PM  
 Glucose 77 08/17/2020 12:11 PM  
 BUN 19 08/17/2020 12:11 PM  
 Creatinine 1.40 (H) 08/17/2020 12:11 PM  
 GFR est AA 40 (L) 08/17/2020 12:11 PM  
 GFR est non-AA 35 (L) 08/17/2020 12:11 PM  
 Calcium 9.5 08/17/2020 12:11 PM  
 
Lab Results Component Value Date/Time Alk. phosphatase 83 08/17/2020 12:11 PM  
 Protein, total 7.5 08/17/2020 12:11 PM  
 Albumin 4.3 08/17/2020 12:11 PM  
 Globulin 4.7 (H) 09/17/2012 03:40 PM  
 A-G Ratio 1.3 08/17/2020 12:11 PM  
 
 
 
ASSESSMENT Ms. Jose Ramon Marcano is a 82 y.o. female with history of seizure disorder, anxiety, presumed ITP diagnosed by Dr. Rodgers Carrel  status post splenectomy in 2002. Prior treatments have included WinRho, steroids, IVIG. She had a partial response after splenectomy and has since been on danazol that she says she is tolerating well. She has had normal platelet count for years on Danazol. Declined stopping the drug and started to taper on 8/4/17. Platelets remain normal on reduced dose of 200 mg every other day DISCUSSION Patient has been on Danazol for over 10 years with normal platelet counts and hence was tapered off in Jan 2019 but apparently has not stopped taking it nor does she want to stop it. Her platelets continue to remain normal 
 
 
We will continue observation. Discussed that ITP does not need treatment for platelets over 85-73V. They do not have a good understanding of the disease, despite multiple counseling attempts. PLAN 
- CBC diff every 12 months , call with bleeding - Prefers to stay on 200 mg of Danazol every other day RTC 1 year Obdulia Douglas MD 
 
Ms. Ramón Mac has a reminder for a \"due or due soon\" health maintenance. I have asked that she contact her primary care provider for follow-up on this health maintenance.

## 2020-08-21 NOTE — PROGRESS NOTES
Martha Dominguez is a 82 y.o. female Chief Complaint Patient presents with  Follow-up Thrombocytopenia- ITP 1. Have you been to the ER, urgent care clinic since your last visit? Hospitalized since your last visit? No. 
 
2. Have you seen or consulted any other health care providers outside of the [address] [place] [address] since your last visit? Include any pap smears or colon screening. Yes, patient went to [facility] and was given pills for her blood pressure being too high.

## 2021-01-01 ENCOUNTER — HOME CARE VISIT (OUTPATIENT)
Dept: SCHEDULING | Facility: HOME HEALTH | Age: 86
End: 2021-01-01
Payer: MEDICARE

## 2021-01-01 ENCOUNTER — APPOINTMENT (OUTPATIENT)
Dept: GENERAL RADIOLOGY | Age: 86
DRG: 388 | End: 2021-01-01
Attending: SURGERY
Payer: MEDICARE

## 2021-01-01 ENCOUNTER — HOSPICE ADMISSION (OUTPATIENT)
Dept: HOSPICE | Facility: HOSPICE | Age: 86
End: 2021-01-01
Payer: MEDICARE

## 2021-01-01 ENCOUNTER — HOSPITAL ENCOUNTER (INPATIENT)
Age: 86
LOS: 6 days | Discharge: NURSING, REHAB OR LONG-TERM CARE | DRG: 388 | End: 2021-01-16
Attending: EMERGENCY MEDICINE | Admitting: SURGERY
Payer: MEDICARE

## 2021-01-01 ENCOUNTER — APPOINTMENT (OUTPATIENT)
Dept: GENERAL RADIOLOGY | Age: 86
DRG: 388 | End: 2021-01-01
Attending: EMERGENCY MEDICINE
Payer: MEDICARE

## 2021-01-01 ENCOUNTER — APPOINTMENT (OUTPATIENT)
Dept: GENERAL RADIOLOGY | Age: 86
DRG: 388 | End: 2021-01-01
Attending: HOSPITALIST
Payer: MEDICARE

## 2021-01-01 ENCOUNTER — HOSPITAL ENCOUNTER (INPATIENT)
Age: 86
LOS: 12 days | Discharge: HOSPICE | DRG: 871 | End: 2021-03-11
Attending: EMERGENCY MEDICINE | Admitting: FAMILY MEDICINE
Payer: MEDICARE

## 2021-01-01 ENCOUNTER — APPOINTMENT (OUTPATIENT)
Dept: GENERAL RADIOLOGY | Age: 86
DRG: 388 | End: 2021-01-01
Attending: NURSE PRACTITIONER
Payer: MEDICARE

## 2021-01-01 ENCOUNTER — APPOINTMENT (OUTPATIENT)
Dept: CT IMAGING | Age: 86
DRG: 388 | End: 2021-01-01
Attending: EMERGENCY MEDICINE
Payer: MEDICARE

## 2021-01-01 ENCOUNTER — APPOINTMENT (OUTPATIENT)
Dept: CT IMAGING | Age: 86
DRG: 871 | End: 2021-01-01
Attending: EMERGENCY MEDICINE
Payer: MEDICARE

## 2021-01-01 ENCOUNTER — APPOINTMENT (OUTPATIENT)
Dept: GENERAL RADIOLOGY | Age: 86
DRG: 871 | End: 2021-01-01
Attending: EMERGENCY MEDICINE
Payer: MEDICARE

## 2021-01-01 ENCOUNTER — HOME CARE VISIT (OUTPATIENT)
Dept: HOSPICE | Facility: HOSPICE | Age: 86
End: 2021-01-01
Payer: MEDICARE

## 2021-01-01 VITALS
DIASTOLIC BLOOD PRESSURE: 58 MMHG | SYSTOLIC BLOOD PRESSURE: 109 MMHG | OXYGEN SATURATION: 99 % | HEART RATE: 75 BPM | RESPIRATION RATE: 14 BRPM

## 2021-01-01 VITALS
HEART RATE: 64 BPM | TEMPERATURE: 97.9 F | HEIGHT: 66 IN | BODY MASS INDEX: 21.87 KG/M2 | OXYGEN SATURATION: 93 % | WEIGHT: 136.1 LBS | RESPIRATION RATE: 18 BRPM | SYSTOLIC BLOOD PRESSURE: 129 MMHG | DIASTOLIC BLOOD PRESSURE: 80 MMHG

## 2021-01-01 VITALS
RESPIRATION RATE: 17 BRPM | OXYGEN SATURATION: 98 % | BODY MASS INDEX: 23.74 KG/M2 | TEMPERATURE: 100.2 F | WEIGHT: 147.71 LBS | HEART RATE: 108 BPM | SYSTOLIC BLOOD PRESSURE: 103 MMHG | HEIGHT: 66 IN | DIASTOLIC BLOOD PRESSURE: 54 MMHG

## 2021-01-01 VITALS
OXYGEN SATURATION: 93 % | RESPIRATION RATE: 22 BRPM | HEART RATE: 106 BPM | SYSTOLIC BLOOD PRESSURE: 216 MMHG | DIASTOLIC BLOOD PRESSURE: 90 MMHG

## 2021-01-01 VITALS — HEART RATE: 80 BPM | TEMPERATURE: 97.1 F | RESPIRATION RATE: 18 BRPM

## 2021-01-01 DIAGNOSIS — K56.609 SBO (SMALL BOWEL OBSTRUCTION) (HCC): Primary | ICD-10-CM

## 2021-01-01 DIAGNOSIS — R33.9 URINARY RETENTION: ICD-10-CM

## 2021-01-01 DIAGNOSIS — L89.154 PRESSURE INJURY OF SACRAL REGION, STAGE 4 (HCC): ICD-10-CM

## 2021-01-01 DIAGNOSIS — N17.9 AKI (ACUTE KIDNEY INJURY) (HCC): ICD-10-CM

## 2021-01-01 DIAGNOSIS — R10.84 ABDOMINAL PAIN, GENERALIZED: ICD-10-CM

## 2021-01-01 DIAGNOSIS — K59.00 CONSTIPATION, UNSPECIFIED CONSTIPATION TYPE: ICD-10-CM

## 2021-01-01 DIAGNOSIS — F03.90 DEMENTIA WITHOUT BEHAVIORAL DISTURBANCE, UNSPECIFIED DEMENTIA TYPE: ICD-10-CM

## 2021-01-01 DIAGNOSIS — L89.154 SACRAL DECUBITUS ULCER, STAGE IV (HCC): Primary | ICD-10-CM

## 2021-01-01 LAB
ALBUMIN SERPL-MCNC: 1.2 G/DL (ref 3.5–5)
ALBUMIN SERPL-MCNC: 2.3 G/DL (ref 3.5–5)
ALBUMIN SERPL-MCNC: 2.6 G/DL (ref 3.5–5)
ALBUMIN SERPL-MCNC: 3.5 G/DL (ref 3.5–5)
ALBUMIN/GLOB SERPL: 0.2 {RATIO} (ref 1.1–2.2)
ALBUMIN/GLOB SERPL: 0.6 {RATIO} (ref 1.1–2.2)
ALBUMIN/GLOB SERPL: 0.6 {RATIO} (ref 1.1–2.2)
ALBUMIN/GLOB SERPL: 0.7 {RATIO} (ref 1.1–2.2)
ALP SERPL-CCNC: 102 U/L (ref 45–117)
ALP SERPL-CCNC: 171 U/L (ref 45–117)
ALP SERPL-CCNC: 69 U/L (ref 45–117)
ALP SERPL-CCNC: 84 U/L (ref 45–117)
ALT SERPL-CCNC: 36 U/L (ref 12–78)
ALT SERPL-CCNC: 44 U/L (ref 12–78)
ALT SERPL-CCNC: 50 U/L (ref 12–78)
ALT SERPL-CCNC: 94 U/L (ref 12–78)
ANION GAP SERPL CALC-SCNC: 1 MMOL/L (ref 5–15)
ANION GAP SERPL CALC-SCNC: 4 MMOL/L (ref 5–15)
ANION GAP SERPL CALC-SCNC: 5 MMOL/L (ref 5–15)
ANION GAP SERPL CALC-SCNC: 6 MMOL/L (ref 5–15)
ANION GAP SERPL CALC-SCNC: 7 MMOL/L (ref 5–15)
ANION GAP SERPL CALC-SCNC: 9 MMOL/L (ref 5–15)
APPEARANCE UR: CLEAR
APPEARANCE UR: CLEAR
AST SERPL-CCNC: 131 U/L (ref 15–37)
AST SERPL-CCNC: 50 U/L (ref 15–37)
AST SERPL-CCNC: 55 U/L (ref 15–37)
AST SERPL-CCNC: 61 U/L (ref 15–37)
ATRIAL RATE: 73 BPM
BACTERIA SPEC CULT: ABNORMAL
BACTERIA SPEC CULT: ABNORMAL
BACTERIA SPEC CULT: NORMAL
BACTERIA SPEC CULT: NORMAL
BACTERIA URNS QL MICRO: NEGATIVE /HPF
BACTERIA URNS QL MICRO: NEGATIVE /HPF
BASOPHILS # BLD: 0 K/UL (ref 0–0.1)
BASOPHILS NFR BLD: 0 % (ref 0–1)
BASOPHILS NFR BLD: 1 % (ref 0–1)
BASOPHILS NFR BLD: 1 % (ref 0–1)
BILIRUB SERPL-MCNC: 0.5 MG/DL (ref 0.2–1)
BILIRUB SERPL-MCNC: 0.6 MG/DL (ref 0.2–1)
BILIRUB SERPL-MCNC: 0.7 MG/DL (ref 0.2–1)
BILIRUB SERPL-MCNC: 0.9 MG/DL (ref 0.2–1)
BILIRUB UR QL: NEGATIVE
BILIRUB UR QL: NEGATIVE
BNP SERPL-MCNC: 268 PG/ML
BNP SERPL-MCNC: 392 PG/ML
BUN SERPL-MCNC: 11 MG/DL (ref 6–20)
BUN SERPL-MCNC: 12 MG/DL (ref 6–20)
BUN SERPL-MCNC: 15 MG/DL (ref 6–20)
BUN SERPL-MCNC: 16 MG/DL (ref 6–20)
BUN SERPL-MCNC: 19 MG/DL (ref 6–20)
BUN SERPL-MCNC: 25 MG/DL (ref 6–20)
BUN SERPL-MCNC: 45 MG/DL (ref 6–20)
BUN SERPL-MCNC: 51 MG/DL (ref 6–20)
BUN SERPL-MCNC: 52 MG/DL (ref 6–20)
BUN SERPL-MCNC: 54 MG/DL (ref 6–20)
BUN SERPL-MCNC: 56 MG/DL (ref 6–20)
BUN SERPL-MCNC: 67 MG/DL (ref 6–20)
BUN SERPL-MCNC: 73 MG/DL (ref 6–20)
BUN/CREAT SERPL: 13 (ref 12–20)
BUN/CREAT SERPL: 14 (ref 12–20)
BUN/CREAT SERPL: 15 (ref 12–20)
BUN/CREAT SERPL: 15 (ref 12–20)
BUN/CREAT SERPL: 17 (ref 12–20)
BUN/CREAT SERPL: 22 (ref 12–20)
BUN/CREAT SERPL: 27 (ref 12–20)
BUN/CREAT SERPL: 30 (ref 12–20)
BUN/CREAT SERPL: 41 (ref 12–20)
BUN/CREAT SERPL: 45 (ref 12–20)
BUN/CREAT SERPL: 61 (ref 12–20)
BUN/CREAT SERPL: 67 (ref 12–20)
BUN/CREAT SERPL: 77 (ref 12–20)
CALCIUM SERPL-MCNC: 10 MG/DL (ref 8.5–10.1)
CALCIUM SERPL-MCNC: 8.5 MG/DL (ref 8.5–10.1)
CALCIUM SERPL-MCNC: 8.5 MG/DL (ref 8.5–10.1)
CALCIUM SERPL-MCNC: 8.6 MG/DL (ref 8.5–10.1)
CALCIUM SERPL-MCNC: 8.6 MG/DL (ref 8.5–10.1)
CALCIUM SERPL-MCNC: 8.8 MG/DL (ref 8.5–10.1)
CALCIUM SERPL-MCNC: 9.1 MG/DL (ref 8.5–10.1)
CALCIUM SERPL-MCNC: 9.2 MG/DL (ref 8.5–10.1)
CALCIUM SERPL-MCNC: 9.6 MG/DL (ref 8.5–10.1)
CALCIUM SERPL-MCNC: 9.8 MG/DL (ref 8.5–10.1)
CALCULATED P AXIS, ECG09: 68 DEGREES
CALCULATED R AXIS, ECG10: 72 DEGREES
CALCULATED T AXIS, ECG11: 69 DEGREES
CHLORIDE SERPL-SCNC: 100 MMOL/L (ref 97–108)
CHLORIDE SERPL-SCNC: 108 MMOL/L (ref 97–108)
CHLORIDE SERPL-SCNC: 112 MMOL/L (ref 97–108)
CHLORIDE SERPL-SCNC: 113 MMOL/L (ref 97–108)
CHLORIDE SERPL-SCNC: 113 MMOL/L (ref 97–108)
CHLORIDE SERPL-SCNC: 117 MMOL/L (ref 97–108)
CHLORIDE SERPL-SCNC: 120 MMOL/L (ref 97–108)
CHLORIDE SERPL-SCNC: 121 MMOL/L (ref 97–108)
CHLORIDE SERPL-SCNC: 121 MMOL/L (ref 97–108)
CHLORIDE SERPL-SCNC: 98 MMOL/L (ref 97–108)
CHLORIDE SERPL-SCNC: 99 MMOL/L (ref 97–108)
CO2 SERPL-SCNC: 20 MMOL/L (ref 21–32)
CO2 SERPL-SCNC: 22 MMOL/L (ref 21–32)
CO2 SERPL-SCNC: 23 MMOL/L (ref 21–32)
CO2 SERPL-SCNC: 24 MMOL/L (ref 21–32)
CO2 SERPL-SCNC: 25 MMOL/L (ref 21–32)
CO2 SERPL-SCNC: 26 MMOL/L (ref 21–32)
CO2 SERPL-SCNC: 28 MMOL/L (ref 21–32)
COLOR UR: ABNORMAL
COLOR UR: ABNORMAL
COMMENT, HOLDF: NORMAL
COVID-19 RAPID TEST, COVR: DETECTED
CREAT SERPL-MCNC: 0.46 MG/DL (ref 0.55–1.02)
CREAT SERPL-MCNC: 0.5 MG/DL (ref 0.55–1.02)
CREAT SERPL-MCNC: 0.56 MG/DL (ref 0.55–1.02)
CREAT SERPL-MCNC: 0.72 MG/DL (ref 0.55–1.02)
CREAT SERPL-MCNC: 0.72 MG/DL (ref 0.55–1.02)
CREAT SERPL-MCNC: 0.73 MG/DL (ref 0.55–1.02)
CREAT SERPL-MCNC: 0.79 MG/DL (ref 0.55–1.02)
CREAT SERPL-MCNC: 1 MG/DL (ref 0.55–1.02)
CREAT SERPL-MCNC: 1.19 MG/DL (ref 0.55–1.02)
CREAT SERPL-MCNC: 1.69 MG/DL (ref 0.55–1.02)
CREAT SERPL-MCNC: 3.04 MG/DL (ref 0.55–1.02)
CREAT SERPL-MCNC: 3.88 MG/DL (ref 0.55–1.02)
CREAT SERPL-MCNC: 4.07 MG/DL (ref 0.55–1.02)
CREAT UR-MCNC: 31.6 MG/DL
DIAGNOSIS, 93000: NORMAL
DIFFERENTIAL METHOD BLD: ABNORMAL
EOSINOPHIL # BLD: 0 K/UL (ref 0–0.4)
EOSINOPHIL # BLD: 0.1 K/UL (ref 0–0.4)
EOSINOPHIL # BLD: 0.2 K/UL (ref 0–0.4)
EOSINOPHIL NFR BLD: 0 % (ref 0–7)
EOSINOPHIL NFR BLD: 1 % (ref 0–7)
EOSINOPHIL NFR BLD: 2 % (ref 0–7)
EOSINOPHIL NFR BLD: 3 % (ref 0–7)
EOSINOPHIL NFR BLD: 4 % (ref 0–7)
EPITH CASTS URNS QL MICRO: ABNORMAL /LPF
EPITH CASTS URNS QL MICRO: ABNORMAL /LPF
ERYTHROCYTE [DISTWIDTH] IN BLOOD BY AUTOMATED COUNT: 15.7 % (ref 11.5–14.5)
ERYTHROCYTE [DISTWIDTH] IN BLOOD BY AUTOMATED COUNT: 15.9 % (ref 11.5–14.5)
ERYTHROCYTE [DISTWIDTH] IN BLOOD BY AUTOMATED COUNT: 16.1 % (ref 11.5–14.5)
ERYTHROCYTE [DISTWIDTH] IN BLOOD BY AUTOMATED COUNT: 16.4 % (ref 11.5–14.5)
ERYTHROCYTE [DISTWIDTH] IN BLOOD BY AUTOMATED COUNT: 16.6 % (ref 11.5–14.5)
ERYTHROCYTE [DISTWIDTH] IN BLOOD BY AUTOMATED COUNT: 16.7 % (ref 11.5–14.5)
ERYTHROCYTE [DISTWIDTH] IN BLOOD BY AUTOMATED COUNT: 17 % (ref 11.5–14.5)
ERYTHROCYTE [DISTWIDTH] IN BLOOD BY AUTOMATED COUNT: 17 % (ref 11.5–14.5)
ERYTHROCYTE [DISTWIDTH] IN BLOOD BY AUTOMATED COUNT: 17.2 % (ref 11.5–14.5)
ERYTHROCYTE [DISTWIDTH] IN BLOOD BY AUTOMATED COUNT: 17.3 % (ref 11.5–14.5)
ERYTHROCYTE [DISTWIDTH] IN BLOOD BY AUTOMATED COUNT: 17.5 % (ref 11.5–14.5)
EST. AVERAGE GLUCOSE BLD GHB EST-MCNC: 105 MG/DL
GLOBULIN SER CALC-MCNC: 4 G/DL (ref 2–4)
GLOBULIN SER CALC-MCNC: 4.1 G/DL (ref 2–4)
GLOBULIN SER CALC-MCNC: 5.2 G/DL (ref 2–4)
GLOBULIN SER CALC-MCNC: 5.3 G/DL (ref 2–4)
GLUCOSE BLD STRIP.AUTO-MCNC: 120 MG/DL (ref 65–100)
GLUCOSE SERPL-MCNC: 102 MG/DL (ref 65–100)
GLUCOSE SERPL-MCNC: 104 MG/DL (ref 65–100)
GLUCOSE SERPL-MCNC: 107 MG/DL (ref 65–100)
GLUCOSE SERPL-MCNC: 114 MG/DL (ref 65–100)
GLUCOSE SERPL-MCNC: 118 MG/DL (ref 65–100)
GLUCOSE SERPL-MCNC: 121 MG/DL (ref 65–100)
GLUCOSE SERPL-MCNC: 147 MG/DL (ref 65–100)
GLUCOSE SERPL-MCNC: 155 MG/DL (ref 65–100)
GLUCOSE SERPL-MCNC: 76 MG/DL (ref 65–100)
GLUCOSE SERPL-MCNC: 79 MG/DL (ref 65–100)
GLUCOSE SERPL-MCNC: 86 MG/DL (ref 65–100)
GLUCOSE SERPL-MCNC: 95 MG/DL (ref 65–100)
GLUCOSE SERPL-MCNC: 99 MG/DL (ref 65–100)
GLUCOSE UR STRIP.AUTO-MCNC: NEGATIVE MG/DL
GLUCOSE UR STRIP.AUTO-MCNC: NEGATIVE MG/DL
HBA1C MFR BLD: 5.3 % (ref 4–5.6)
HCT VFR BLD AUTO: 24 % (ref 35–47)
HCT VFR BLD AUTO: 25.2 % (ref 35–47)
HCT VFR BLD AUTO: 25.9 % (ref 35–47)
HCT VFR BLD AUTO: 26.3 % (ref 35–47)
HCT VFR BLD AUTO: 27.3 % (ref 35–47)
HCT VFR BLD AUTO: 28.3 % (ref 35–47)
HCT VFR BLD AUTO: 28.4 % (ref 35–47)
HCT VFR BLD AUTO: 28.8 % (ref 35–47)
HCT VFR BLD AUTO: 29.4 % (ref 35–47)
HCT VFR BLD AUTO: 31.5 % (ref 35–47)
HCT VFR BLD AUTO: 34.6 % (ref 35–47)
HEALTH STATUS, XMCV2T: NORMAL
HGB BLD-MCNC: 10.7 G/DL (ref 11.5–16)
HGB BLD-MCNC: 11.4 G/DL (ref 11.5–16)
HGB BLD-MCNC: 7.7 G/DL (ref 11.5–16)
HGB BLD-MCNC: 8.1 G/DL (ref 11.5–16)
HGB BLD-MCNC: 8.2 G/DL (ref 11.5–16)
HGB BLD-MCNC: 8.4 G/DL (ref 11.5–16)
HGB BLD-MCNC: 8.6 G/DL (ref 11.5–16)
HGB BLD-MCNC: 9 G/DL (ref 11.5–16)
HGB BLD-MCNC: 9.5 G/DL (ref 11.5–16)
HGB BLD-MCNC: 9.5 G/DL (ref 11.5–16)
HGB BLD-MCNC: 9.7 G/DL (ref 11.5–16)
HGB UR QL STRIP: ABNORMAL
HGB UR QL STRIP: NEGATIVE
IMM GRANULOCYTES # BLD AUTO: 0 K/UL
IMM GRANULOCYTES # BLD AUTO: 0 K/UL
IMM GRANULOCYTES # BLD AUTO: 0 K/UL (ref 0–0.04)
IMM GRANULOCYTES # BLD AUTO: 0.1 K/UL (ref 0–0.04)
IMM GRANULOCYTES # BLD AUTO: 0.2 K/UL (ref 0–0.04)
IMM GRANULOCYTES NFR BLD AUTO: 0 %
IMM GRANULOCYTES NFR BLD AUTO: 0 %
IMM GRANULOCYTES NFR BLD AUTO: 1 % (ref 0–0.5)
IMM GRANULOCYTES NFR BLD AUTO: 2 % (ref 0–0.5)
IMM GRANULOCYTES NFR BLD AUTO: 2 % (ref 0–0.5)
KETONES UR QL STRIP.AUTO: ABNORMAL MG/DL
KETONES UR QL STRIP.AUTO: NEGATIVE MG/DL
LACTATE SERPL-SCNC: 1.6 MMOL/L (ref 0.4–2)
LEUKOCYTE ESTERASE UR QL STRIP.AUTO: ABNORMAL
LEUKOCYTE ESTERASE UR QL STRIP.AUTO: ABNORMAL
LIPASE SERPL-CCNC: 153 U/L (ref 73–393)
LYMPHOCYTES # BLD: 0.5 K/UL (ref 0.8–3.5)
LYMPHOCYTES # BLD: 0.8 K/UL (ref 0.8–3.5)
LYMPHOCYTES # BLD: 0.9 K/UL (ref 0.8–3.5)
LYMPHOCYTES # BLD: 0.9 K/UL (ref 0.8–3.5)
LYMPHOCYTES # BLD: 1 K/UL (ref 0.8–3.5)
LYMPHOCYTES # BLD: 1 K/UL (ref 0.8–3.5)
LYMPHOCYTES # BLD: 1.2 K/UL (ref 0.8–3.5)
LYMPHOCYTES NFR BLD: 12 % (ref 12–49)
LYMPHOCYTES NFR BLD: 13 % (ref 12–49)
LYMPHOCYTES NFR BLD: 15 % (ref 12–49)
LYMPHOCYTES NFR BLD: 6 % (ref 12–49)
LYMPHOCYTES NFR BLD: 7 % (ref 12–49)
LYMPHOCYTES NFR BLD: 8 % (ref 12–49)
LYMPHOCYTES NFR BLD: 8 % (ref 12–49)
LYMPHOCYTES NFR BLD: 9 % (ref 12–49)
LYMPHOCYTES NFR BLD: 9 % (ref 12–49)
MAGNESIUM SERPL-MCNC: 1.7 MG/DL (ref 1.6–2.4)
MAGNESIUM SERPL-MCNC: 1.9 MG/DL (ref 1.6–2.4)
MAGNESIUM SERPL-MCNC: 3.9 MG/DL (ref 1.6–2.4)
MCH RBC QN AUTO: 29.1 PG (ref 26–34)
MCH RBC QN AUTO: 29.2 PG (ref 26–34)
MCH RBC QN AUTO: 29.4 PG (ref 26–34)
MCH RBC QN AUTO: 29.4 PG (ref 26–34)
MCH RBC QN AUTO: 29.5 PG (ref 26–34)
MCH RBC QN AUTO: 29.5 PG (ref 26–34)
MCH RBC QN AUTO: 29.7 PG (ref 26–34)
MCH RBC QN AUTO: 29.8 PG (ref 26–34)
MCH RBC QN AUTO: 29.9 PG (ref 26–34)
MCH RBC QN AUTO: 30.1 PG (ref 26–34)
MCH RBC QN AUTO: 30.5 PG (ref 26–34)
MCHC RBC AUTO-ENTMCNC: 31.5 G/DL (ref 30–36.5)
MCHC RBC AUTO-ENTMCNC: 31.7 G/DL (ref 30–36.5)
MCHC RBC AUTO-ENTMCNC: 31.8 G/DL (ref 30–36.5)
MCHC RBC AUTO-ENTMCNC: 31.9 G/DL (ref 30–36.5)
MCHC RBC AUTO-ENTMCNC: 32.1 G/DL (ref 30–36.5)
MCHC RBC AUTO-ENTMCNC: 32.1 G/DL (ref 30–36.5)
MCHC RBC AUTO-ENTMCNC: 32.3 G/DL (ref 30–36.5)
MCHC RBC AUTO-ENTMCNC: 32.9 G/DL (ref 30–36.5)
MCHC RBC AUTO-ENTMCNC: 33.5 G/DL (ref 30–36.5)
MCHC RBC AUTO-ENTMCNC: 33.7 G/DL (ref 30–36.5)
MCHC RBC AUTO-ENTMCNC: 34 G/DL (ref 30–36.5)
MCV RBC AUTO: 88 FL (ref 80–99)
MCV RBC AUTO: 89.4 FL (ref 80–99)
MCV RBC AUTO: 90.3 FL (ref 80–99)
MCV RBC AUTO: 91.3 FL (ref 80–99)
MCV RBC AUTO: 91.3 FL (ref 80–99)
MCV RBC AUTO: 91.6 FL (ref 80–99)
MCV RBC AUTO: 91.6 FL (ref 80–99)
MCV RBC AUTO: 92 FL (ref 80–99)
MCV RBC AUTO: 92.3 FL (ref 80–99)
MCV RBC AUTO: 92.5 FL (ref 80–99)
MCV RBC AUTO: 93.2 FL (ref 80–99)
MONOCYTES # BLD: 0.4 K/UL (ref 0–1)
MONOCYTES # BLD: 0.5 K/UL (ref 0–1)
MONOCYTES # BLD: 0.6 K/UL (ref 0–1)
MONOCYTES # BLD: 0.7 K/UL (ref 0–1)
MONOCYTES # BLD: 0.7 K/UL (ref 0–1)
MONOCYTES NFR BLD: 10 % (ref 5–13)
MONOCYTES NFR BLD: 10 % (ref 5–13)
MONOCYTES NFR BLD: 11 % (ref 5–13)
MONOCYTES NFR BLD: 3 % (ref 5–13)
MONOCYTES NFR BLD: 4 % (ref 5–13)
MONOCYTES NFR BLD: 6 % (ref 5–13)
MONOCYTES NFR BLD: 6 % (ref 5–13)
NEUTS BAND NFR BLD MANUAL: 3 % (ref 0–6)
NEUTS SEG # BLD: 10.6 K/UL (ref 1.8–8)
NEUTS SEG # BLD: 11.5 K/UL (ref 1.8–8)
NEUTS SEG # BLD: 12.2 K/UL (ref 1.8–8)
NEUTS SEG # BLD: 13.1 K/UL (ref 1.8–8)
NEUTS SEG # BLD: 14.7 K/UL (ref 1.8–8)
NEUTS SEG # BLD: 3.9 K/UL (ref 1.8–8)
NEUTS SEG # BLD: 4.6 K/UL (ref 1.8–8)
NEUTS SEG # BLD: 4.9 K/UL (ref 1.8–8)
NEUTS SEG # BLD: 6 K/UL (ref 1.8–8)
NEUTS SEG # BLD: 9.1 K/UL (ref 1.8–8)
NEUTS SEG # BLD: 9.8 K/UL (ref 1.8–8)
NEUTS SEG NFR BLD: 69 % (ref 32–75)
NEUTS SEG NFR BLD: 71 % (ref 32–75)
NEUTS SEG NFR BLD: 77 % (ref 32–75)
NEUTS SEG NFR BLD: 83 % (ref 32–75)
NEUTS SEG NFR BLD: 84 % (ref 32–75)
NEUTS SEG NFR BLD: 85 % (ref 32–75)
NEUTS SEG NFR BLD: 86 % (ref 32–75)
NEUTS SEG NFR BLD: 88 % (ref 32–75)
NEUTS SEG NFR BLD: 89 % (ref 32–75)
NITRITE UR QL STRIP.AUTO: NEGATIVE
NITRITE UR QL STRIP.AUTO: NEGATIVE
NRBC # BLD: 0 K/UL (ref 0–0.01)
NRBC # BLD: 0.02 K/UL (ref 0–0.01)
NRBC # BLD: 0.03 K/UL (ref 0–0.01)
NRBC # BLD: 0.04 K/UL (ref 0–0.01)
NRBC # BLD: 0.06 K/UL (ref 0–0.01)
NRBC # BLD: 0.07 K/UL (ref 0–0.01)
NRBC BLD-RTO: 0 PER 100 WBC
NRBC BLD-RTO: 0.1 PER 100 WBC
NRBC BLD-RTO: 0.2 PER 100 WBC
NRBC BLD-RTO: 0.3 PER 100 WBC
NRBC BLD-RTO: 0.5 PER 100 WBC
NRBC BLD-RTO: 0.6 PER 100 WBC
P-R INTERVAL, ECG05: 140 MS
PH UR STRIP: 5 [PH] (ref 5–8)
PH UR STRIP: 5.5 [PH] (ref 5–8)
PHOSPHATE SERPL-MCNC: 4.8 MG/DL (ref 2.6–4.7)
PLATELET # BLD AUTO: 234 K/UL (ref 150–400)
PLATELET # BLD AUTO: 262 K/UL (ref 150–400)
PLATELET # BLD AUTO: 272 K/UL (ref 150–400)
PLATELET # BLD AUTO: 275 K/UL (ref 150–400)
PLATELET # BLD AUTO: 291 K/UL (ref 150–400)
PLATELET # BLD AUTO: 296 K/UL (ref 150–400)
PLATELET # BLD AUTO: 298 K/UL (ref 150–400)
PLATELET # BLD AUTO: 304 K/UL (ref 150–400)
PLATELET # BLD AUTO: 316 K/UL (ref 150–400)
PLATELET # BLD AUTO: 350 K/UL (ref 150–400)
PLATELET # BLD AUTO: 360 K/UL (ref 150–400)
PLATELET COMMENTS,PCOM: ABNORMAL
PMV BLD AUTO: 10 FL (ref 8.9–12.9)
PMV BLD AUTO: 10.1 FL (ref 8.9–12.9)
PMV BLD AUTO: 10.9 FL (ref 8.9–12.9)
PMV BLD AUTO: 11.1 FL (ref 8.9–12.9)
PMV BLD AUTO: 11.3 FL (ref 8.9–12.9)
PMV BLD AUTO: 11.3 FL (ref 8.9–12.9)
PMV BLD AUTO: 11.4 FL (ref 8.9–12.9)
PMV BLD AUTO: 11.5 FL (ref 8.9–12.9)
PMV BLD AUTO: 12.1 FL (ref 8.9–12.9)
PMV BLD AUTO: 9.9 FL (ref 8.9–12.9)
PMV BLD AUTO: 9.9 FL (ref 8.9–12.9)
POTASSIUM SERPL-SCNC: 3.4 MMOL/L (ref 3.5–5.1)
POTASSIUM SERPL-SCNC: 3.6 MMOL/L (ref 3.5–5.1)
POTASSIUM SERPL-SCNC: 3.7 MMOL/L (ref 3.5–5.1)
POTASSIUM SERPL-SCNC: 3.8 MMOL/L (ref 3.5–5.1)
POTASSIUM SERPL-SCNC: 3.9 MMOL/L (ref 3.5–5.1)
POTASSIUM SERPL-SCNC: 4.3 MMOL/L (ref 3.5–5.1)
POTASSIUM SERPL-SCNC: 5.2 MMOL/L (ref 3.5–5.1)
POTASSIUM SERPL-SCNC: 5.2 MMOL/L (ref 3.5–5.1)
POTASSIUM SERPL-SCNC: 5.5 MMOL/L (ref 3.5–5.1)
PROT SERPL-MCNC: 6.3 G/DL (ref 6.4–8.2)
PROT SERPL-MCNC: 6.5 G/DL (ref 6.4–8.2)
PROT SERPL-MCNC: 6.7 G/DL (ref 6.4–8.2)
PROT SERPL-MCNC: 8.7 G/DL (ref 6.4–8.2)
PROT UR STRIP-MCNC: 30 MG/DL
PROT UR STRIP-MCNC: NEGATIVE MG/DL
Q-T INTERVAL, ECG07: 356 MS
QRS DURATION, ECG06: 86 MS
QTC CALCULATION (BEZET), ECG08: 392 MS
RBC # BLD AUTO: 2.62 M/UL (ref 3.8–5.2)
RBC # BLD AUTO: 2.73 M/UL (ref 3.8–5.2)
RBC # BLD AUTO: 2.78 M/UL (ref 3.8–5.2)
RBC # BLD AUTO: 2.86 M/UL (ref 3.8–5.2)
RBC # BLD AUTO: 2.95 M/UL (ref 3.8–5.2)
RBC # BLD AUTO: 3.09 M/UL (ref 3.8–5.2)
RBC # BLD AUTO: 3.11 M/UL (ref 3.8–5.2)
RBC # BLD AUTO: 3.22 M/UL (ref 3.8–5.2)
RBC # BLD AUTO: 3.22 M/UL (ref 3.8–5.2)
RBC # BLD AUTO: 3.58 M/UL (ref 3.8–5.2)
RBC # BLD AUTO: 3.83 M/UL (ref 3.8–5.2)
RBC #/AREA URNS HPF: ABNORMAL /HPF (ref 0–5)
RBC #/AREA URNS HPF: ABNORMAL /HPF (ref 0–5)
RBC MORPH BLD: ABNORMAL
SAMPLES BEING HELD,HOLD: NORMAL
SARS-COV-2, COV2: NORMAL
SARS-COV-2, COV2: NORMAL
SARS-COV-2, COV2: NOT DETECTED
SARS-COV-2, XPLCVT: NOT DETECTED
SERVICE CMNT-IMP: ABNORMAL
SERVICE CMNT-IMP: ABNORMAL
SERVICE CMNT-IMP: NORMAL
SERVICE CMNT-IMP: NORMAL
SODIUM SERPL-SCNC: 130 MMOL/L (ref 136–145)
SODIUM SERPL-SCNC: 132 MMOL/L (ref 136–145)
SODIUM SERPL-SCNC: 135 MMOL/L (ref 136–145)
SODIUM SERPL-SCNC: 137 MMOL/L (ref 136–145)
SODIUM SERPL-SCNC: 142 MMOL/L (ref 136–145)
SODIUM SERPL-SCNC: 147 MMOL/L (ref 136–145)
SODIUM SERPL-SCNC: 148 MMOL/L (ref 136–145)
SODIUM SERPL-SCNC: 148 MMOL/L (ref 136–145)
SODIUM SERPL-SCNC: 149 MMOL/L (ref 136–145)
SODIUM SERPL-SCNC: 150 MMOL/L (ref 136–145)
SODIUM SERPL-SCNC: 152 MMOL/L (ref 136–145)
SODIUM UR-SCNC: 42 MMOL/L
SOURCE, COVRS: ABNORMAL
SOURCE, COVRS: NORMAL
SOURCE, COVRS: NORMAL
SP GR UR REFRACTOMETRY: 1.02 (ref 1–1.03)
SPECIMEN SOURCE, FCOV2M: NORMAL
SPECIMEN TYPE, XMCV1T: NORMAL
TROPONIN I SERPL-MCNC: <0.05 NG/ML
TROPONIN I SERPL-MCNC: <0.05 NG/ML
TSH SERPL DL<=0.05 MIU/L-ACNC: 0.4 UIU/ML (ref 0.36–3.74)
UA: UC IF INDICATED,UAUC: ABNORMAL
UR CULT HOLD, URHOLD: NORMAL
UROBILINOGEN UR QL STRIP.AUTO: 0.2 EU/DL (ref 0.2–1)
UROBILINOGEN UR QL STRIP.AUTO: 0.2 EU/DL (ref 0.2–1)
VENTRICULAR RATE, ECG03: 73 BPM
WBC # BLD AUTO: 10.5 K/UL (ref 3.6–11)
WBC # BLD AUTO: 11.7 K/UL (ref 3.6–11)
WBC # BLD AUTO: 12.3 K/UL (ref 3.6–11)
WBC # BLD AUTO: 13.1 K/UL (ref 3.6–11)
WBC # BLD AUTO: 13.7 K/UL (ref 3.6–11)
WBC # BLD AUTO: 14.9 K/UL (ref 3.6–11)
WBC # BLD AUTO: 16.6 K/UL (ref 3.6–11)
WBC # BLD AUTO: 5.5 K/UL (ref 3.6–11)
WBC # BLD AUTO: 6.4 K/UL (ref 3.6–11)
WBC # BLD AUTO: 6.5 K/UL (ref 3.6–11)
WBC # BLD AUTO: 7 K/UL (ref 3.6–11)
WBC URNS QL MICRO: ABNORMAL /HPF (ref 0–4)
WBC URNS QL MICRO: ABNORMAL /HPF (ref 0–4)

## 2021-01-01 PROCEDURE — 74011250636 HC RX REV CODE- 250/636: Performed by: INTERNAL MEDICINE

## 2021-01-01 PROCEDURE — 36415 COLL VENOUS BLD VENIPUNCTURE: CPT

## 2021-01-01 PROCEDURE — 74011250636 HC RX REV CODE- 250/636: Performed by: FAMILY MEDICINE

## 2021-01-01 PROCEDURE — 97535 SELF CARE MNGMENT TRAINING: CPT

## 2021-01-01 PROCEDURE — 74011250636 HC RX REV CODE- 250/636: Performed by: SURGERY

## 2021-01-01 PROCEDURE — 84300 ASSAY OF URINE SODIUM: CPT

## 2021-01-01 PROCEDURE — 83690 ASSAY OF LIPASE: CPT

## 2021-01-01 PROCEDURE — 74011250637 HC RX REV CODE- 250/637: Performed by: INTERNAL MEDICINE

## 2021-01-01 PROCEDURE — 99231 SBSQ HOSP IP/OBS SF/LOW 25: CPT | Performed by: SURGERY

## 2021-01-01 PROCEDURE — 65270000029 HC RM PRIVATE

## 2021-01-01 PROCEDURE — 74011250637 HC RX REV CODE- 250/637: Performed by: FAMILY MEDICINE

## 2021-01-01 PROCEDURE — 80048 BASIC METABOLIC PNL TOTAL CA: CPT

## 2021-01-01 PROCEDURE — 74011000258 HC RX REV CODE- 258: Performed by: INTERNAL MEDICINE

## 2021-01-01 PROCEDURE — 74018 RADEX ABDOMEN 1 VIEW: CPT

## 2021-01-01 PROCEDURE — G0299 HHS/HOSPICE OF RN EA 15 MIN: HCPCS

## 2021-01-01 PROCEDURE — 94760 N-INVAS EAR/PLS OXIMETRY 1: CPT

## 2021-01-01 PROCEDURE — 74011000250 HC RX REV CODE- 250: Performed by: FAMILY MEDICINE

## 2021-01-01 PROCEDURE — 81001 URINALYSIS AUTO W/SCOPE: CPT

## 2021-01-01 PROCEDURE — 80053 COMPREHEN METABOLIC PANEL: CPT

## 2021-01-01 PROCEDURE — 85025 COMPLETE CBC W/AUTO DIFF WBC: CPT

## 2021-01-01 PROCEDURE — 51702 INSERT TEMP BLADDER CATH: CPT

## 2021-01-01 PROCEDURE — 99231 SBSQ HOSP IP/OBS SF/LOW 25: CPT | Performed by: NURSE PRACTITIONER

## 2021-01-01 PROCEDURE — 87040 BLOOD CULTURE FOR BACTERIA: CPT

## 2021-01-01 PROCEDURE — 96375 TX/PRO/DX INJ NEW DRUG ADDON: CPT

## 2021-01-01 PROCEDURE — 0651 HSPC ROUTINE HOME CARE

## 2021-01-01 PROCEDURE — 74011250637 HC RX REV CODE- 250/637: Performed by: HOSPITALIST

## 2021-01-01 PROCEDURE — 96374 THER/PROPH/DIAG INJ IV PUSH: CPT

## 2021-01-01 PROCEDURE — 2709999900 HC NON-CHARGEABLE SUPPLY

## 2021-01-01 PROCEDURE — 97116 GAIT TRAINING THERAPY: CPT

## 2021-01-01 PROCEDURE — 97530 THERAPEUTIC ACTIVITIES: CPT

## 2021-01-01 PROCEDURE — 84484 ASSAY OF TROPONIN QUANT: CPT

## 2021-01-01 PROCEDURE — 82962 GLUCOSE BLOOD TEST: CPT

## 2021-01-01 PROCEDURE — 71046 X-RAY EXAM CHEST 2 VIEWS: CPT

## 2021-01-01 PROCEDURE — 74019 RADEX ABDOMEN 2 VIEWS: CPT

## 2021-01-01 PROCEDURE — 99221 1ST HOSP IP/OBS SF/LOW 40: CPT | Performed by: SURGERY

## 2021-01-01 PROCEDURE — 99222 1ST HOSP IP/OBS MODERATE 55: CPT | Performed by: PHYSICAL MEDICINE & REHABILITATION

## 2021-01-01 PROCEDURE — 3336500001 HSPC ELECTION

## 2021-01-01 PROCEDURE — 74011000636 HC RX REV CODE- 636: Performed by: RADIOLOGY

## 2021-01-01 PROCEDURE — 74011250636 HC RX REV CODE- 250/636: Performed by: EMERGENCY MEDICINE

## 2021-01-01 PROCEDURE — 93005 ELECTROCARDIOGRAM TRACING: CPT

## 2021-01-01 PROCEDURE — 74011000258 HC RX REV CODE- 258: Performed by: HOSPITALIST

## 2021-01-01 PROCEDURE — 74011000258 HC RX REV CODE- 258: Performed by: FAMILY MEDICINE

## 2021-01-01 PROCEDURE — 97165 OT EVAL LOW COMPLEX 30 MIN: CPT

## 2021-01-01 PROCEDURE — 74011250637 HC RX REV CODE- 250/637: Performed by: SURGERY

## 2021-01-01 PROCEDURE — 99285 EMERGENCY DEPT VISIT HI MDM: CPT

## 2021-01-01 PROCEDURE — 77030041076 HC DRSG AG OPTICELL MDII -A

## 2021-01-01 PROCEDURE — 82570 ASSAY OF URINE CREATININE: CPT

## 2021-01-01 PROCEDURE — 83880 ASSAY OF NATRIURETIC PEPTIDE: CPT

## 2021-01-01 PROCEDURE — 71045 X-RAY EXAM CHEST 1 VIEW: CPT

## 2021-01-01 PROCEDURE — HOSPICE MEDICATION HC HH HOSPICE MEDICATION

## 2021-01-01 PROCEDURE — 83735 ASSAY OF MAGNESIUM: CPT

## 2021-01-01 PROCEDURE — 99284 EMERGENCY DEPT VISIT MOD MDM: CPT

## 2021-01-01 PROCEDURE — 74011000258 HC RX REV CODE- 258: Performed by: SURGERY

## 2021-01-01 PROCEDURE — 74011000258 HC RX REV CODE- 258: Performed by: EMERGENCY MEDICINE

## 2021-01-01 PROCEDURE — 97161 PT EVAL LOW COMPLEX 20 MIN: CPT

## 2021-01-01 PROCEDURE — 74177 CT ABD & PELVIS W/CONTRAST: CPT

## 2021-01-01 PROCEDURE — 3331090004 HSPC SERVICE INTENSITY ADD-ON

## 2021-01-01 PROCEDURE — 84100 ASSAY OF PHOSPHORUS: CPT

## 2021-01-01 PROCEDURE — 87635 SARS-COV-2 COVID-19 AMP PRB: CPT

## 2021-01-01 PROCEDURE — 83605 ASSAY OF LACTIC ACID: CPT

## 2021-01-01 PROCEDURE — 99232 SBSQ HOSP IP/OBS MODERATE 35: CPT | Performed by: SURGERY

## 2021-01-01 PROCEDURE — 0D9670Z DRAINAGE OF STOMACH WITH DRAINAGE DEVICE, VIA NATURAL OR ARTIFICIAL OPENING: ICD-10-PCS | Performed by: INTERNAL MEDICINE

## 2021-01-01 PROCEDURE — U0005 INFEC AGEN DETEC AMPLI PROBE: HCPCS

## 2021-01-01 PROCEDURE — 74176 CT ABD & PELVIS W/O CONTRAST: CPT

## 2021-01-01 PROCEDURE — 74011000636 HC RX REV CODE- 636: Performed by: SURGERY

## 2021-01-01 PROCEDURE — 65660000001 HC RM ICU INTERMED STEPDOWN

## 2021-01-01 PROCEDURE — 77030037877 HC DRSG MEPILEX >48IN BORD MOLN -A

## 2021-01-01 PROCEDURE — 84443 ASSAY THYROID STIM HORMONE: CPT

## 2021-01-01 PROCEDURE — 83036 HEMOGLOBIN GLYCOSYLATED A1C: CPT

## 2021-01-01 RX ORDER — HYDRALAZINE HYDROCHLORIDE 20 MG/ML
10 INJECTION INTRAMUSCULAR; INTRAVENOUS
Status: DISCONTINUED | OUTPATIENT
Start: 2021-01-01 | End: 2021-01-01 | Stop reason: HOSPADM

## 2021-01-01 RX ORDER — HYDROMORPHONE HYDROCHLORIDE 1 MG/ML
1 INJECTION, SOLUTION INTRAMUSCULAR; INTRAVENOUS; SUBCUTANEOUS
Status: DISCONTINUED | OUTPATIENT
Start: 2021-01-01 | End: 2021-01-01

## 2021-01-01 RX ORDER — AMLODIPINE BESYLATE 5 MG/1
5 TABLET ORAL DAILY
Status: DISCONTINUED | OUTPATIENT
Start: 2021-01-01 | End: 2021-01-01

## 2021-01-01 RX ORDER — PROMETHAZINE HYDROCHLORIDE 25 MG/1
12.5 TABLET ORAL
Status: DISCONTINUED | OUTPATIENT
Start: 2021-01-01 | End: 2021-01-01 | Stop reason: HOSPADM

## 2021-01-01 RX ORDER — ACETAMINOPHEN 325 MG/1
650 TABLET ORAL
Status: DISCONTINUED | OUTPATIENT
Start: 2021-01-01 | End: 2021-01-01 | Stop reason: HOSPADM

## 2021-01-01 RX ORDER — SODIUM CHLORIDE 0.9 % (FLUSH) 0.9 %
5-40 SYRINGE (ML) INJECTION AS NEEDED
Status: DISCONTINUED | OUTPATIENT
Start: 2021-01-01 | End: 2021-01-01 | Stop reason: HOSPADM

## 2021-01-01 RX ORDER — METRONIDAZOLE 500 MG/100ML
500 INJECTION, SOLUTION INTRAVENOUS
Status: COMPLETED | OUTPATIENT
Start: 2021-01-01 | End: 2021-01-01

## 2021-01-01 RX ORDER — SODIUM CHLORIDE 9 MG/ML
100 INJECTION, SOLUTION INTRAVENOUS CONTINUOUS
Status: DISCONTINUED | OUTPATIENT
Start: 2021-01-01 | End: 2021-01-01

## 2021-01-01 RX ORDER — CITALOPRAM 20 MG/1
10 TABLET, FILM COATED ORAL DAILY
Status: DISCONTINUED | OUTPATIENT
Start: 2021-01-01 | End: 2021-01-01 | Stop reason: HOSPADM

## 2021-01-01 RX ORDER — MORPHINE SULFATE 2 MG/ML
1 INJECTION, SOLUTION INTRAMUSCULAR; INTRAVENOUS
Status: DISCONTINUED | OUTPATIENT
Start: 2021-01-01 | End: 2021-01-01 | Stop reason: HOSPADM

## 2021-01-01 RX ORDER — METRONIDAZOLE 500 MG/100ML
500 INJECTION, SOLUTION INTRAVENOUS EVERY 12 HOURS
Status: DISCONTINUED | OUTPATIENT
Start: 2021-01-01 | End: 2021-01-01

## 2021-01-01 RX ORDER — LISINOPRIL 20 MG/1
20 TABLET ORAL DAILY
Status: DISCONTINUED | OUTPATIENT
Start: 2021-01-01 | End: 2021-01-01 | Stop reason: HOSPADM

## 2021-01-01 RX ORDER — SODIUM CHLORIDE, SODIUM LACTATE, POTASSIUM CHLORIDE, CALCIUM CHLORIDE 600; 310; 30; 20 MG/100ML; MG/100ML; MG/100ML; MG/100ML
125 INJECTION, SOLUTION INTRAVENOUS CONTINUOUS
Status: DISCONTINUED | OUTPATIENT
Start: 2021-01-01 | End: 2021-01-01

## 2021-01-01 RX ORDER — SODIUM CHLORIDE 0.9 % (FLUSH) 0.9 %
5-40 SYRINGE (ML) INJECTION EVERY 8 HOURS
Status: DISCONTINUED | OUTPATIENT
Start: 2021-01-01 | End: 2021-01-01 | Stop reason: HOSPADM

## 2021-01-01 RX ORDER — HEPARIN SODIUM 5000 [USP'U]/ML
5000 INJECTION, SOLUTION INTRAVENOUS; SUBCUTANEOUS EVERY 12 HOURS
Status: DISCONTINUED | OUTPATIENT
Start: 2021-01-01 | End: 2021-01-01

## 2021-01-01 RX ORDER — SODIUM CHLORIDE, SODIUM LACTATE, POTASSIUM CHLORIDE, CALCIUM CHLORIDE 600; 310; 30; 20 MG/100ML; MG/100ML; MG/100ML; MG/100ML
1000 INJECTION, SOLUTION INTRAVENOUS ONCE
Status: COMPLETED | OUTPATIENT
Start: 2021-01-01 | End: 2021-01-01

## 2021-01-01 RX ORDER — BALSAM PERU/CASTOR OIL
OINTMENT (GRAM) TOPICAL 2 TIMES DAILY
Status: DISCONTINUED | OUTPATIENT
Start: 2021-01-01 | End: 2021-01-01 | Stop reason: HOSPADM

## 2021-01-01 RX ORDER — DEXTROSE MONOHYDRATE 50 MG/ML
75 INJECTION, SOLUTION INTRAVENOUS CONTINUOUS
Status: DISCONTINUED | OUTPATIENT
Start: 2021-01-01 | End: 2021-01-01

## 2021-01-01 RX ORDER — DEXTROSE MONOHYDRATE AND SODIUM CHLORIDE 5; .9 G/100ML; G/100ML
50 INJECTION, SOLUTION INTRAVENOUS CONTINUOUS
Status: DISCONTINUED | OUTPATIENT
Start: 2021-01-01 | End: 2021-01-01

## 2021-01-01 RX ORDER — MORPHINE SULFATE 4 MG/ML
2 INJECTION INTRAVENOUS ONCE
Status: COMPLETED | OUTPATIENT
Start: 2021-01-01 | End: 2021-01-01

## 2021-01-01 RX ORDER — ONDANSETRON 2 MG/ML
4 INJECTION INTRAMUSCULAR; INTRAVENOUS
Status: DISCONTINUED | OUTPATIENT
Start: 2021-01-01 | End: 2021-01-01

## 2021-01-01 RX ORDER — ACETAMINOPHEN 650 MG/1
650 SUPPOSITORY RECTAL
Status: DISCONTINUED | OUTPATIENT
Start: 2021-01-01 | End: 2021-01-01 | Stop reason: HOSPADM

## 2021-01-01 RX ORDER — LEVETIRACETAM 500 MG/1
500 TABLET ORAL DAILY
Status: DISCONTINUED | OUTPATIENT
Start: 2021-01-01 | End: 2021-01-01 | Stop reason: HOSPADM

## 2021-01-01 RX ORDER — ONDANSETRON 2 MG/ML
4 INJECTION INTRAMUSCULAR; INTRAVENOUS
Status: DISCONTINUED | OUTPATIENT
Start: 2021-01-01 | End: 2021-01-01 | Stop reason: HOSPADM

## 2021-01-01 RX ORDER — LISINOPRIL 5 MG/1
5 TABLET ORAL DAILY
Status: DISCONTINUED | OUTPATIENT
Start: 2021-01-01 | End: 2021-01-01

## 2021-01-01 RX ORDER — DOCUSATE SODIUM 100 MG/1
100 CAPSULE, LIQUID FILLED ORAL 2 TIMES DAILY
Qty: 60 CAP | Refills: 2 | Status: SHIPPED | OUTPATIENT
Start: 2021-01-01 | End: 2021-04-16

## 2021-01-01 RX ORDER — VANCOMYCIN HYDROCHLORIDE
1250 ONCE
Status: DISPENSED | OUTPATIENT
Start: 2021-01-01 | End: 2021-01-01

## 2021-01-01 RX ORDER — AMLODIPINE BESYLATE 5 MG/1
10 TABLET ORAL DAILY
Status: DISCONTINUED | OUTPATIENT
Start: 2021-01-01 | End: 2021-01-01 | Stop reason: HOSPADM

## 2021-01-01 RX ORDER — DOCUSATE SODIUM 100 MG/1
100 CAPSULE, LIQUID FILLED ORAL 2 TIMES DAILY
Status: DISCONTINUED | OUTPATIENT
Start: 2021-01-01 | End: 2021-01-01 | Stop reason: HOSPADM

## 2021-01-01 RX ORDER — LORAZEPAM 2 MG/ML
2 INJECTION INTRAMUSCULAR
Status: DISCONTINUED | OUTPATIENT
Start: 2021-01-01 | End: 2021-01-01 | Stop reason: HOSPADM

## 2021-01-01 RX ORDER — VANCOMYCIN/0.9 % SOD CHLORIDE 1.5G/250ML
1500 PLASTIC BAG, INJECTION (ML) INTRAVENOUS ONCE
Status: DISCONTINUED | OUTPATIENT
Start: 2021-01-01 | End: 2021-01-01 | Stop reason: DRUGHIGH

## 2021-01-01 RX ORDER — BALSAM PERU/CASTOR OIL
OINTMENT (GRAM) TOPICAL 2 TIMES DAILY
Qty: 1 TUBE | Refills: 0 | Status: SHIPPED | OUTPATIENT
Start: 2021-01-01

## 2021-01-01 RX ORDER — HYDROMORPHONE HYDROCHLORIDE 1 MG/ML
0.5 INJECTION, SOLUTION INTRAMUSCULAR; INTRAVENOUS; SUBCUTANEOUS
Status: DISCONTINUED | OUTPATIENT
Start: 2021-01-01 | End: 2021-01-01 | Stop reason: HOSPADM

## 2021-01-01 RX ADMIN — LEVETIRACETAM 500 MG: 100 INJECTION, SOLUTION INTRAVENOUS at 10:39

## 2021-01-01 RX ADMIN — MORPHINE SULFATE 1 MG: 2 INJECTION, SOLUTION INTRAMUSCULAR; INTRAVENOUS at 13:50

## 2021-01-01 RX ADMIN — CASTOR OIL AND BALSAM, PERU: 788; 87 OINTMENT TOPICAL at 17:51

## 2021-01-01 RX ADMIN — LISINOPRIL 20 MG: 20 TABLET ORAL at 11:54

## 2021-01-01 RX ADMIN — DEXTROSE MONOHYDRATE 75 ML/HR: 50 INJECTION, SOLUTION INTRAVENOUS at 00:12

## 2021-01-01 RX ADMIN — DEXTROSE MONOHYDRATE 75 ML/HR: 50 INJECTION, SOLUTION INTRAVENOUS at 07:36

## 2021-01-01 RX ADMIN — CASTOR OIL AND BALSAM, PERU: 788; 87 OINTMENT TOPICAL at 18:05

## 2021-01-01 RX ADMIN — AMLODIPINE BESYLATE 10 MG: 5 TABLET ORAL at 09:31

## 2021-01-01 RX ADMIN — DAKIN'S SOLUTION 0.125% (QUARTER STRENGTH): 0.12 SOLUTION at 13:43

## 2021-01-01 RX ADMIN — LISINOPRIL 20 MG: 20 TABLET ORAL at 09:32

## 2021-01-01 RX ADMIN — HEPARIN SODIUM 5000 UNITS: 5000 INJECTION INTRAVENOUS; SUBCUTANEOUS at 23:04

## 2021-01-01 RX ADMIN — HEPARIN SODIUM 5000 UNITS: 5000 INJECTION INTRAVENOUS; SUBCUTANEOUS at 12:02

## 2021-01-01 RX ADMIN — MORPHINE SULFATE 1 MG: 2 INJECTION, SOLUTION INTRAMUSCULAR; INTRAVENOUS at 09:45

## 2021-01-01 RX ADMIN — DAKIN'S SOLUTION 0.125% (QUARTER STRENGTH): 0.12 SOLUTION at 09:57

## 2021-01-01 RX ADMIN — Medication 10 ML: at 06:34

## 2021-01-01 RX ADMIN — Medication 10 ML: at 07:04

## 2021-01-01 RX ADMIN — METRONIDAZOLE 500 MG: 500 INJECTION, SOLUTION INTRAVENOUS at 22:00

## 2021-01-01 RX ADMIN — DOXYCYCLINE 100 MG: 100 INJECTION, POWDER, LYOPHILIZED, FOR SOLUTION INTRAVENOUS at 21:48

## 2021-01-01 RX ADMIN — HEPARIN SODIUM 5000 UNITS: 5000 INJECTION INTRAVENOUS; SUBCUTANEOUS at 10:49

## 2021-01-01 RX ADMIN — DAKIN'S SOLUTION 0.125% (QUARTER STRENGTH): 0.12 SOLUTION at 09:49

## 2021-01-01 RX ADMIN — DAKIN'S SOLUTION 0.125% (QUARTER STRENGTH): 0.12 SOLUTION at 08:59

## 2021-01-01 RX ADMIN — VANCOMYCIN HYDROCHLORIDE 1000 MG: 1 INJECTION, POWDER, LYOPHILIZED, FOR SOLUTION INTRAVENOUS at 08:45

## 2021-01-01 RX ADMIN — DOCUSATE SODIUM 100 MG: 100 CAPSULE, LIQUID FILLED ORAL at 17:36

## 2021-01-01 RX ADMIN — AMLODIPINE BESYLATE 10 MG: 5 TABLET ORAL at 15:57

## 2021-01-01 RX ADMIN — CITALOPRAM HYDROBROMIDE 10 MG: 20 TABLET ORAL at 09:51

## 2021-01-01 RX ADMIN — HYDROMORPHONE HYDROCHLORIDE 1 MG: 1 INJECTION, SOLUTION INTRAMUSCULAR; INTRAVENOUS; SUBCUTANEOUS at 04:01

## 2021-01-01 RX ADMIN — Medication 10 ML: at 21:21

## 2021-01-01 RX ADMIN — CEFEPIME 2 G: 2 INJECTION, POWDER, FOR SOLUTION INTRAVENOUS at 23:04

## 2021-01-01 RX ADMIN — Medication 10 ML: at 06:28

## 2021-01-01 RX ADMIN — Medication 10 ML: at 14:00

## 2021-01-01 RX ADMIN — Medication 10 ML: at 12:26

## 2021-01-01 RX ADMIN — Medication 10 ML: at 07:20

## 2021-01-01 RX ADMIN — DOXYCYCLINE 100 MG: 100 INJECTION, POWDER, LYOPHILIZED, FOR SOLUTION INTRAVENOUS at 22:30

## 2021-01-01 RX ADMIN — CASTOR OIL AND BALSAM, PERU: 788; 87 OINTMENT TOPICAL at 09:36

## 2021-01-01 RX ADMIN — VANCOMYCIN HYDROCHLORIDE 1000 MG: 1 INJECTION, POWDER, LYOPHILIZED, FOR SOLUTION INTRAVENOUS at 23:56

## 2021-01-01 RX ADMIN — DOXYCYCLINE 100 MG: 100 INJECTION, POWDER, LYOPHILIZED, FOR SOLUTION INTRAVENOUS at 09:17

## 2021-01-01 RX ADMIN — CASTOR OIL AND BALSAM, PERU: 788; 87 OINTMENT TOPICAL at 18:15

## 2021-01-01 RX ADMIN — CASTOR OIL AND BALSAM, PERU: 788; 87 OINTMENT TOPICAL at 08:55

## 2021-01-01 RX ADMIN — CASTOR OIL AND BALSAM, PERU: 788; 87 OINTMENT TOPICAL at 18:18

## 2021-01-01 RX ADMIN — DEXTROSE MONOHYDRATE 75 ML/HR: 50 INJECTION, SOLUTION INTRAVENOUS at 21:46

## 2021-01-01 RX ADMIN — Medication 10 ML: at 21:31

## 2021-01-01 RX ADMIN — Medication 10 ML: at 13:44

## 2021-01-01 RX ADMIN — CASTOR OIL AND BALSAM, PERU: 788; 87 OINTMENT TOPICAL at 10:22

## 2021-01-01 RX ADMIN — DOXYCYCLINE 100 MG: 100 INJECTION, POWDER, LYOPHILIZED, FOR SOLUTION INTRAVENOUS at 03:12

## 2021-01-01 RX ADMIN — DEXTROSE MONOHYDRATE AND SODIUM CHLORIDE 100 ML/HR: 5; .9 INJECTION, SOLUTION INTRAVENOUS at 11:31

## 2021-01-01 RX ADMIN — DOCUSATE SODIUM 100 MG: 100 CAPSULE, LIQUID FILLED ORAL at 09:51

## 2021-01-01 RX ADMIN — Medication 10 ML: at 07:11

## 2021-01-01 RX ADMIN — MORPHINE SULFATE 1 MG: 2 INJECTION, SOLUTION INTRAMUSCULAR; INTRAVENOUS at 12:25

## 2021-01-01 RX ADMIN — Medication 10 ML: at 21:12

## 2021-01-01 RX ADMIN — Medication 10 ML: at 07:35

## 2021-01-01 RX ADMIN — Medication 10 ML: at 13:48

## 2021-01-01 RX ADMIN — CEFTRIAXONE SODIUM 1 G: 1 INJECTION, POWDER, FOR SOLUTION INTRAMUSCULAR; INTRAVENOUS at 04:27

## 2021-01-01 RX ADMIN — LEVETIRACETAM 500 MG: 100 INJECTION, SOLUTION INTRAVENOUS at 09:44

## 2021-01-01 RX ADMIN — Medication 10 ML: at 00:00

## 2021-01-01 RX ADMIN — DOCUSATE SODIUM 100 MG: 100 CAPSULE, LIQUID FILLED ORAL at 17:21

## 2021-01-01 RX ADMIN — ACETAMINOPHEN 650 MG: 325 TABLET ORAL at 16:30

## 2021-01-01 RX ADMIN — DOXYCYCLINE 100 MG: 100 INJECTION, POWDER, LYOPHILIZED, FOR SOLUTION INTRAVENOUS at 11:37

## 2021-01-01 RX ADMIN — DAKIN'S SOLUTION 0.125% (QUARTER STRENGTH): 0.12 SOLUTION at 09:36

## 2021-01-01 RX ADMIN — VANCOMYCIN HYDROCHLORIDE 1000 MG: 1 INJECTION, POWDER, LYOPHILIZED, FOR SOLUTION INTRAVENOUS at 10:28

## 2021-01-01 RX ADMIN — LEVETIRACETAM 500 MG: 100 INJECTION, SOLUTION INTRAVENOUS at 08:59

## 2021-01-01 RX ADMIN — Medication 10 ML: at 22:02

## 2021-01-01 RX ADMIN — CASTOR OIL AND BALSAM, PERU: 788; 87 OINTMENT TOPICAL at 18:45

## 2021-01-01 RX ADMIN — CASTOR OIL AND BALSAM, PERU: 788; 87 OINTMENT TOPICAL at 17:28

## 2021-01-01 RX ADMIN — DEXTROSE MONOHYDRATE 75 ML/HR: 50 INJECTION, SOLUTION INTRAVENOUS at 18:15

## 2021-01-01 RX ADMIN — Medication 10 ML: at 21:48

## 2021-01-01 RX ADMIN — LEVETIRACETAM 500 MG: 100 INJECTION, SOLUTION INTRAVENOUS at 09:49

## 2021-01-01 RX ADMIN — SODIUM CHLORIDE, POTASSIUM CHLORIDE, SODIUM LACTATE AND CALCIUM CHLORIDE 125 ML/HR: 600; 310; 30; 20 INJECTION, SOLUTION INTRAVENOUS at 20:14

## 2021-01-01 RX ADMIN — HEPARIN SODIUM 5000 UNITS: 5000 INJECTION INTRAVENOUS; SUBCUTANEOUS at 10:29

## 2021-01-01 RX ADMIN — MORPHINE SULFATE 1 MG: 2 INJECTION, SOLUTION INTRAMUSCULAR; INTRAVENOUS at 13:37

## 2021-01-01 RX ADMIN — LEVETIRACETAM 500 MG: 100 INJECTION, SOLUTION INTRAVENOUS at 08:55

## 2021-01-01 RX ADMIN — VANCOMYCIN HYDROCHLORIDE 1000 MG: 1 INJECTION, POWDER, LYOPHILIZED, FOR SOLUTION INTRAVENOUS at 20:49

## 2021-01-01 RX ADMIN — HEPARIN SODIUM 5000 UNITS: 5000 INJECTION INTRAVENOUS; SUBCUTANEOUS at 21:31

## 2021-01-01 RX ADMIN — PIPERACILLIN AND TAZOBACTAM 3.38 G: 3; .375 INJECTION, POWDER, LYOPHILIZED, FOR SOLUTION INTRAVENOUS at 20:17

## 2021-01-01 RX ADMIN — HEPARIN SODIUM 5000 UNITS: 5000 INJECTION INTRAVENOUS; SUBCUTANEOUS at 21:37

## 2021-01-01 RX ADMIN — MORPHINE SULFATE 10 MG: 20 SOLUTION ORAL at 13:00

## 2021-01-01 RX ADMIN — DEXTROSE MONOHYDRATE 75 ML/HR: 50 INJECTION, SOLUTION INTRAVENOUS at 16:21

## 2021-01-01 RX ADMIN — Medication 10 ML: at 05:20

## 2021-01-01 RX ADMIN — SODIUM CHLORIDE 100 ML/HR: 9 INJECTION, SOLUTION INTRAVENOUS at 19:07

## 2021-01-01 RX ADMIN — LORAZEPAM 2 MG: 2 INJECTION INTRAMUSCULAR; INTRAVENOUS at 23:07

## 2021-01-01 RX ADMIN — HYDROMORPHONE HYDROCHLORIDE 1 MG: 1 INJECTION, SOLUTION INTRAMUSCULAR; INTRAVENOUS; SUBCUTANEOUS at 10:33

## 2021-01-01 RX ADMIN — LEVETIRACETAM 500 MG: 500 TABLET ORAL at 09:51

## 2021-01-01 RX ADMIN — DAKIN'S SOLUTION 0.125% (QUARTER STRENGTH): 0.12 SOLUTION at 13:30

## 2021-01-01 RX ADMIN — ACETAMINOPHEN 650 MG: 650 SUPPOSITORY RECTAL at 18:42

## 2021-01-01 RX ADMIN — Medication 10 ML: at 22:00

## 2021-01-01 RX ADMIN — DEXTROSE MONOHYDRATE 75 ML/HR: 50 INJECTION, SOLUTION INTRAVENOUS at 13:15

## 2021-01-01 RX ADMIN — SODIUM CHLORIDE 100 ML/HR: 9 INJECTION, SOLUTION INTRAVENOUS at 15:45

## 2021-01-01 RX ADMIN — DEXTROSE MONOHYDRATE 75 ML/HR: 50 INJECTION, SOLUTION INTRAVENOUS at 07:41

## 2021-01-01 RX ADMIN — Medication 10 ML: at 21:40

## 2021-01-01 RX ADMIN — LEVETIRACETAM 500 MG: 500 TABLET ORAL at 11:54

## 2021-01-01 RX ADMIN — Medication 10 ML: at 21:37

## 2021-01-01 RX ADMIN — MORPHINE SULFATE 1 MG: 2 INJECTION, SOLUTION INTRAMUSCULAR; INTRAVENOUS at 09:40

## 2021-01-01 RX ADMIN — Medication 10 ML: at 14:18

## 2021-01-01 RX ADMIN — LEVETIRACETAM 500 MG: 100 INJECTION, SOLUTION INTRAVENOUS at 09:57

## 2021-01-01 RX ADMIN — LEVETIRACETAM 500 MG: 100 INJECTION, SOLUTION INTRAVENOUS at 07:17

## 2021-01-01 RX ADMIN — CASTOR OIL AND BALSAM, PERU: 788; 87 OINTMENT TOPICAL at 09:57

## 2021-01-01 RX ADMIN — Medication 10 ML: at 13:12

## 2021-01-01 RX ADMIN — MORPHINE SULFATE 1 MG: 2 INJECTION, SOLUTION INTRAMUSCULAR; INTRAVENOUS at 03:27

## 2021-01-01 RX ADMIN — LORAZEPAM 2 MG: 2 INJECTION INTRAMUSCULAR; INTRAVENOUS at 09:32

## 2021-01-01 RX ADMIN — DAKIN'S SOLUTION 0.125% (QUARTER STRENGTH): 0.12 SOLUTION at 14:38

## 2021-01-01 RX ADMIN — Medication 10 ML: at 13:29

## 2021-01-01 RX ADMIN — CEFEPIME 2 G: 2 INJECTION, POWDER, FOR SOLUTION INTRAVENOUS at 00:43

## 2021-01-01 RX ADMIN — Medication 10 ML: at 06:36

## 2021-01-01 RX ADMIN — CASTOR OIL AND BALSAM, PERU: 788; 87 OINTMENT TOPICAL at 18:21

## 2021-01-01 RX ADMIN — LISINOPRIL 20 MG: 20 TABLET ORAL at 09:51

## 2021-01-01 RX ADMIN — CEFEPIME 2 G: 2 INJECTION, POWDER, FOR SOLUTION INTRAVENOUS at 21:21

## 2021-01-01 RX ADMIN — Medication 10 ML: at 21:56

## 2021-01-01 RX ADMIN — CEFTRIAXONE SODIUM 1 G: 1 INJECTION, POWDER, FOR SOLUTION INTRAMUSCULAR; INTRAVENOUS at 04:06

## 2021-01-01 RX ADMIN — Medication 10 ML: at 07:27

## 2021-01-01 RX ADMIN — LEVETIRACETAM 500 MG: 500 TABLET ORAL at 09:31

## 2021-01-01 RX ADMIN — MORPHINE SULFATE 1 MG: 2 INJECTION, SOLUTION INTRAMUSCULAR; INTRAVENOUS at 10:22

## 2021-01-01 RX ADMIN — MORPHINE SULFATE 2 MG: 4 INJECTION INTRAVENOUS at 20:09

## 2021-01-01 RX ADMIN — DEXTROSE MONOHYDRATE 75 ML/HR: 50 INJECTION, SOLUTION INTRAVENOUS at 07:09

## 2021-01-01 RX ADMIN — CASTOR OIL AND BALSAM, PERU: 788; 87 OINTMENT TOPICAL at 09:51

## 2021-01-01 RX ADMIN — DEXTROSE MONOHYDRATE AND SODIUM CHLORIDE 100 ML/HR: 5; .9 INJECTION, SOLUTION INTRAVENOUS at 10:11

## 2021-01-01 RX ADMIN — MORPHINE SULFATE 1 MG: 2 INJECTION, SOLUTION INTRAMUSCULAR; INTRAVENOUS at 09:32

## 2021-01-01 RX ADMIN — LORAZEPAM 1 MG: 2 CONCENTRATE ORAL at 13:00

## 2021-01-01 RX ADMIN — LEVETIRACETAM 500 MG: 100 INJECTION, SOLUTION INTRAVENOUS at 04:02

## 2021-01-01 RX ADMIN — CEFEPIME 2 G: 2 INJECTION, POWDER, FOR SOLUTION INTRAVENOUS at 21:30

## 2021-01-01 RX ADMIN — DOXYCYCLINE 100 MG: 100 INJECTION, POWDER, LYOPHILIZED, FOR SOLUTION INTRAVENOUS at 21:16

## 2021-01-01 RX ADMIN — Medication 10 ML: at 06:24

## 2021-01-01 RX ADMIN — MORPHINE SULFATE 1 MG: 2 INJECTION, SOLUTION INTRAMUSCULAR; INTRAVENOUS at 23:05

## 2021-01-01 RX ADMIN — DAKIN'S SOLUTION 0.125% (QUARTER STRENGTH): 0.12 SOLUTION at 10:22

## 2021-01-01 RX ADMIN — DOCUSATE SODIUM 100 MG: 100 CAPSULE, LIQUID FILLED ORAL at 09:32

## 2021-01-01 RX ADMIN — DOXYCYCLINE 100 MG: 100 INJECTION, POWDER, LYOPHILIZED, FOR SOLUTION INTRAVENOUS at 21:12

## 2021-01-01 RX ADMIN — CEFEPIME 2 G: 2 INJECTION, POWDER, FOR SOLUTION INTRAVENOUS at 20:49

## 2021-01-01 RX ADMIN — LEVETIRACETAM 500 MG: 100 INJECTION, SOLUTION INTRAVENOUS at 07:40

## 2021-01-01 RX ADMIN — LEVETIRACETAM 500 MG: 100 INJECTION, SOLUTION INTRAVENOUS at 04:43

## 2021-01-01 RX ADMIN — LEVETIRACETAM 500 MG: 100 INJECTION, SOLUTION INTRAVENOUS at 10:48

## 2021-01-01 RX ADMIN — DIATRIZOATE MEGLUMINE AND DIATRIZOATE SODIUM 100 ML: 660; 100 LIQUID ORAL; RECTAL at 19:54

## 2021-01-01 RX ADMIN — Medication 10 ML: at 00:12

## 2021-01-01 RX ADMIN — LEVETIRACETAM 500 MG: 100 INJECTION, SOLUTION INTRAVENOUS at 15:55

## 2021-01-01 RX ADMIN — AMLODIPINE BESYLATE 10 MG: 5 TABLET ORAL at 09:51

## 2021-01-01 RX ADMIN — Medication 10 ML: at 07:17

## 2021-01-01 RX ADMIN — Medication 10 ML: at 23:04

## 2021-01-01 RX ADMIN — HEPARIN SODIUM 5000 UNITS: 5000 INJECTION INTRAVENOUS; SUBCUTANEOUS at 09:56

## 2021-01-01 RX ADMIN — Medication 10 ML: at 13:51

## 2021-01-01 RX ADMIN — LEVETIRACETAM 500 MG: 100 INJECTION, SOLUTION INTRAVENOUS at 12:01

## 2021-01-01 RX ADMIN — DOXYCYCLINE 100 MG: 100 INJECTION, POWDER, LYOPHILIZED, FOR SOLUTION INTRAVENOUS at 10:56

## 2021-01-01 RX ADMIN — LEVETIRACETAM 500 MG: 100 INJECTION, SOLUTION INTRAVENOUS at 15:33

## 2021-01-01 RX ADMIN — Medication 10 ML: at 23:18

## 2021-01-01 RX ADMIN — DOXYCYCLINE 100 MG: 100 INJECTION, POWDER, LYOPHILIZED, FOR SOLUTION INTRAVENOUS at 10:09

## 2021-01-01 RX ADMIN — DEXTROSE MONOHYDRATE 75 ML/HR: 50 INJECTION, SOLUTION INTRAVENOUS at 10:48

## 2021-01-01 RX ADMIN — MORPHINE SULFATE 1 MG: 2 INJECTION, SOLUTION INTRAMUSCULAR; INTRAVENOUS at 06:56

## 2021-01-01 RX ADMIN — MORPHINE SULFATE 1 MG: 2 INJECTION, SOLUTION INTRAMUSCULAR; INTRAVENOUS at 06:52

## 2021-01-01 RX ADMIN — DEXTROSE MONOHYDRATE 75 ML/HR: 50 INJECTION, SOLUTION INTRAVENOUS at 22:02

## 2021-01-01 RX ADMIN — CEFTRIAXONE SODIUM 1 G: 1 INJECTION, POWDER, FOR SOLUTION INTRAMUSCULAR; INTRAVENOUS at 03:21

## 2021-01-01 RX ADMIN — HEPARIN SODIUM 5000 UNITS: 5000 INJECTION INTRAVENOUS; SUBCUTANEOUS at 09:44

## 2021-01-01 RX ADMIN — HEPARIN SODIUM 5000 UNITS: 5000 INJECTION INTRAVENOUS; SUBCUTANEOUS at 00:43

## 2021-01-01 RX ADMIN — LEVETIRACETAM 500 MG: 100 INJECTION, SOLUTION INTRAVENOUS at 16:43

## 2021-01-01 RX ADMIN — CASTOR OIL AND BALSAM, PERU: 788; 87 OINTMENT TOPICAL at 09:49

## 2021-01-01 RX ADMIN — IOPAMIDOL 100 ML: 755 INJECTION, SOLUTION INTRAVENOUS at 22:54

## 2021-01-01 RX ADMIN — DOXYCYCLINE 100 MG: 100 INJECTION, POWDER, LYOPHILIZED, FOR SOLUTION INTRAVENOUS at 11:58

## 2021-01-01 RX ADMIN — CASTOR OIL AND BALSAM, PERU: 788; 87 OINTMENT TOPICAL at 17:09

## 2021-01-01 RX ADMIN — SODIUM CHLORIDE, POTASSIUM CHLORIDE, SODIUM LACTATE AND CALCIUM CHLORIDE 1000 ML: 600; 310; 30; 20 INJECTION, SOLUTION INTRAVENOUS at 22:16

## 2021-01-01 RX ADMIN — SODIUM CHLORIDE 1000 ML: 9 INJECTION, SOLUTION INTRAVENOUS at 21:08

## 2021-01-01 RX ADMIN — CASTOR OIL AND BALSAM, PERU: 788; 87 OINTMENT TOPICAL at 12:02

## 2021-01-01 RX ADMIN — CASTOR OIL AND BALSAM, PERU: 788; 87 OINTMENT TOPICAL at 09:55

## 2021-01-01 RX ADMIN — CEFEPIME HYDROCHLORIDE 2 G: 2 INJECTION, POWDER, FOR SOLUTION INTRAVENOUS at 21:08

## 2021-01-01 RX ADMIN — DAKIN'S SOLUTION 0.125% (QUARTER STRENGTH): 0.12 SOLUTION at 08:55

## 2021-01-01 RX ADMIN — DEXTROSE MONOHYDRATE 75 ML/HR: 50 INJECTION, SOLUTION INTRAVENOUS at 05:20

## 2021-01-01 RX ADMIN — CASTOR OIL AND BALSAM, PERU: 788; 87 OINTMENT TOPICAL at 10:29

## 2021-01-01 RX ADMIN — CEFTRIAXONE SODIUM 1 G: 1 INJECTION, POWDER, FOR SOLUTION INTRAMUSCULAR; INTRAVENOUS at 03:32

## 2021-01-01 RX ADMIN — Medication 10 ML: at 16:21

## 2021-01-01 RX ADMIN — CASTOR OIL AND BALSAM, PERU: 788; 87 OINTMENT TOPICAL at 08:59

## 2021-01-01 RX ADMIN — HEPARIN SODIUM 5000 UNITS: 5000 INJECTION INTRAVENOUS; SUBCUTANEOUS at 21:20

## 2021-01-10 PROBLEM — K56.609 SMALL BOWEL OBSTRUCTION (HCC): Status: ACTIVE | Noted: 2021-01-01

## 2021-01-10 NOTE — Clinical Note
Status[de-identified] INPATIENT [101]   Type of Bed: Surgical [18]   Inpatient Hospitalization Certified Necessary for the Following Reasons: 9.  Other (further clarification in H&P documentation)   Admitting Diagnosis: Small bowel obstruction [facility]) [[id number]]   Admitting Physician: Kvng Trejo   Attending Physician: Kvng Trejo   Estimated Length of Stay: 3-4 Midnights   Discharge Plan[de-identified] Home with Office Follow-up

## 2021-01-10 NOTE — ED NOTES
Pt arrives via wheelchair from home with c/o abdominal pain onset 3-4 days ago. Denies N/V/D, dysuria or fever. Reports constipation x 2 days.

## 2021-01-10 NOTE — ED PROVIDER NOTES
Patient is an 82-year-old female who came to the emergency department today because of a 4-day history of abdominal pain and 2-day history of constipation. She states this happened gradually. Denies any change in her diet. Denies any falls or trauma. She states that pain is an ache and it is around her bellybutton. Last normal bowel movement was about 3 to 4 days ago. Since then she has had difficulty defecating. Has to strain. Denies any pain with it though. Has taken over-the-counter laxatives that she does not know the name of but has not had any relief. 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 States that she did take all of her other medications as instructed. Denies any fevers or chills. No headache or blurry vision. No respiratory distress or shortness of breath or cough. No rhinorrhea. No chest pain. Denies any pain in the back. Denies any dysuria, urgency, frequency, hematuria. Denies any recent diarrhea. Denies any leg pain or leg swelling. The history is provided by the patient. Abdominal Pain This is a new problem. The current episode started more than 2 days ago. The problem occurs constantly. The problem has been gradually worsening. Associated with: constipation. The pain is located in the periumbilical region. The pain is moderate. Associated symptoms include anorexia and constipation. Pertinent negatives include no fever, no diarrhea, no hematochezia, no melena, no nausea, no vomiting, no dysuria, no frequency, no hematuria, no headaches, no arthralgias, no myalgias, no trauma, no chest pain and no back pain. The pain is relieved by nothing. Past workup comments: nothing. Past medical history comments: abdominal surgery. Past Medical History:  
Diagnosis Date  Hypertension  Seasonal allergic rhinitis  Spleen absent Past Surgical History:  
Procedure Laterality Date  MA ABDOMEN SURGERY PROC UNLISTED    
 spleenectomy Family History:  
Problem Relation Age of Onset  Coronary Artery Disease Neg Hx Social History Socioeconomic History  Marital status:  Spouse name: Not on file  Number of children: Not on file  Years of education: Not on file  Highest education level: Not on file Occupational History  Not on file Social Needs  Financial resource strain: Not on file  Food insecurity Worry: Not on file Inability: Not on file  Transportation needs Medical: Not on file Non-medical: Not on file Tobacco Use  Smoking status: Never Smoker  Smokeless tobacco: Never Used Substance and Sexual Activity  Alcohol use: No  
  Alcohol/week: 0.0 standard drinks  Drug use: No  
 Sexual activity: Not on file Lifestyle  Physical activity Days per week: Not on file Minutes per session: Not on file  Stress: Not on file Relationships  Social connections Talks on phone: Not on file Gets together: Not on file Attends Christian service: Not on file Active member of club or organization: Not on file Attends meetings of clubs or organizations: Not on file Relationship status: Not on file  Intimate partner violence Fear of current or ex partner: Not on file Emotionally abused: Not on file Physically abused: Not on file Forced sexual activity: Not on file Other Topics Concern  Not on file Social History Narrative  Not on file ALLERGIES: Aspirin and Tramadol Review of Systems Constitutional: Negative for chills, diaphoresis, fatigue and fever. HENT: Negative for ear pain, rhinorrhea and sore throat. Eyes: Negative for pain and itching. Respiratory: Negative for cough, chest tightness and shortness of breath. Cardiovascular: Negative for chest pain, palpitations and leg swelling. Gastrointestinal: Positive for abdominal distention, abdominal pain, anorexia and constipation. Negative for blood in stool, diarrhea, hematochezia, melena, nausea, rectal pain and vomiting. Genitourinary: Positive for difficulty urinating. Negative for dysuria, frequency, hematuria, pelvic pain, urgency, vaginal bleeding, vaginal discharge and vaginal pain. Musculoskeletal: Negative for arthralgias, back pain and myalgias. Skin: Negative for pallor, rash and wound. Neurological: Negative for dizziness, numbness and headaches. Vitals:  
 01/10/21 1718 BP: 119/72 Pulse: 98 Resp: 26 Temp: 98.2 °F (36.8 °C) SpO2: 95% Weight: 60.1 kg (132 lb 9.6 oz) Height: 5' 6\" (1.676 m) Physical Exam 
Vitals signs and nursing note reviewed. Constitutional:   
   General: She is not in acute distress. Appearance: She is well-developed and normal weight. She is not ill-appearing, toxic-appearing or diaphoretic. HENT:  
   Head: Normocephalic and atraumatic. Mouth/Throat:  
   Mouth: Mucous membranes are moist.  
Eyes:  
   Extraocular Movements: Extraocular movements intact. Pupils: Pupils are equal, round, and reactive to light. Cardiovascular:  
   Rate and Rhythm: Normal rate and regular rhythm. Heart sounds: Normal heart sounds. No murmur. No friction rub. No gallop. Pulmonary:  
   Effort: Pulmonary effort is normal. No respiratory distress. Breath sounds: Normal breath sounds. No stridor. No wheezing, rhonchi or rales. Chest:  
   Chest wall: No tenderness. Abdominal:  
   General: A surgical scar is present. Bowel sounds are normal. There is distension. Palpations: Abdomen is soft. Tenderness: There is abdominal tenderness in the periumbilical area. There is no guarding or rebound. Negative signs include Godinez's sign and McBurney's sign. Hernia: No hernia is present. Skin: 
   General: Skin is warm and dry. Coloration: Skin is not cyanotic, jaundiced, mottled or pale. Findings: No erythema or rash. Neurological:  
   General: No focal deficit present. Mental Status: She is alert and oriented to person, place, and time. Psychiatric:     
   Mood and Affect: Mood normal. Mood is not anxious or depressed. Behavior: Behavior normal.  
 
  
 
[facility] 
ED Course as of Justin 10 2004 Imelda Trevino Justin 10, 2021  
1753 Pt examined. Patient in no acute distress. Patient laying in bed. 3 to 4-day history of umbilical pain as well as 2-day history of constipation. Has attempted over-the-counter laxatives without relief of symptoms. Does have history of abdominal surgery including splenectomy. On exam abdomen is distended. Does have mild diffuse tenderness more in the periumbilical area. There is no guarding or rigidity. Nonperitoneal.  KUB was ordered at triage. Concern for possible obstruction versus functional constipation. Will get CT scan. Labs have been ordered. [AF] 1801 XR ABD (KUB) [AF] 1831 CBC with anemia which is new compared to previous. Patient does not meet acute transfusion protocol criteria. CMP does show a mild hyponatremia. Mild hyperkalemia 5.3. BUN and creatinine is elevated. This is new. AST elevation. Bicarb and anion gap is normal.  Will repeat to confirm values. Will start gentle IV fluids. CT scan with IV contrast has been discontinued and will get CT scan without contrast due to risk of contrast-induced nephropathy worsening her RICH. [AF] 1832 Lipase within normal limits [AF] 1902 CT ABD PELV WO CONT [AF] 1916 CT scan with bladder obstruction as well as distended urinary bladder and urinary retention. This may be causing her acute kidney injury. Patient is saying that she does not feel that she needs to urinate. She does not think she can urinate. Will insert Bhatt catheter and call for admission.  
 [AF] 1924 Surgery paged [AF] 1927 Ct also with bibasilar lung opacities. Will get covid test.   
 [AF] 1934 >2300cc urine produced. [AF] 1959 Surgery will admit. Hospitalist updated. [AF] ED Course User Index [AF] DO Safia Russ Serve Consult for Admission 7:17 PM 
 
ED Room Number: ER06/06 Patient Name and age:  Cyrus Mesa 82 y.o.  female Working Diagnosis: 1. SBO (small bowel obstruction) ([facility] [address] 2. Abdominal pain, generalized 3. Constipation, unspecified constipation type 4. RICH (acute kidney injury) ([facility] [address] 5. Urinary retention COVID-19 Suspicion:  yes Sepsis present:  no  Reassessment needed: yes Code Status:  Full Code Readmission: no 
Isolation Requirements:  no 
Recommended Level of Care:  telemetry Department:[facility] Adult ED - [phone] Other:  4 day hx of abdominal pain with 2-day history of constipation. This has never happened before. Tried over-the-counter medication for constipation without relief. Here patient is in no acute distress. Vital signs are normal.  Abdomen is distended. No guarding or rigidity. Mild diffuse tenderness. CT scan with distended bladder. Patient does not feel like she can urinate. Bhatt catheter will be inserted. Does have acute kidney injury with creatinine greater than 4. Baseline is slightly greater than 1. CT scan also shows small bowel obstruction. Does have history of splenectomy. Procedures

## 2021-01-11 NOTE — PROGRESS NOTES
Admit Date: 1/10/2021 POD * No surgery found * Procedure:  * No surgery found * HOSPITAL DAY:  
 
ANTIBIOTICS:  
 
FACE TO FACE/ CARE TIME: 22 minutes Subjective:  
 
Patient with nausea and getting ready to have emesis, her abdomen remains distended and she said she is not passed any flatus. I reviewed her CT scan and her stomach was very distended, I think she would benefit from NG tube. Abdominal x-ray still pending this morning. Abdominal pain is no better and no worse Review of Systems Constitutional: Positive for fatigue. Gastrointestinal: Positive for abdominal distention and nausea. Psychiatric/Behavioral: Negative. All other systems reviewed and are negative. Objective:  
 
Blood pressure (!) 135/57, pulse 81, temperature 98.3 °F (36.8 °C), resp. rate 18, height 5' 6\" (1.676 m), weight 132 lb 9.6 oz (60.1 kg), SpO2 95 %. Temp (24hrs), Av.2 °F (36.8 °C), Min:97.8 °F (36.6 °C), Max:98.6 °F (37 °C) Physical Exam 
Vitals signs and nursing note reviewed. Constitutional:   
   General: She is not in acute distress. Comments: Cachectic and elderly appearing Cardiovascular:  
   Rate and Rhythm: Normal rate. Pulmonary:  
   Effort: Pulmonary effort is normal.  
Abdominal:  
   General: There is distension. Comments: Distended but soft, percussion tympany, a few rare bowel sounds, no peritoneal signs or guarding, well-healed low midline incision Skin: 
   General: Skin is warm and dry. Neurological:  
   General: No focal deficit present. Mental Status: She is alert and oriented to person, place, and time. Psychiatric:     
   Mood and Affect: Mood normal.     
   Behavior: Behavior normal.     
   Thought Content: Thought content normal.     
   Judgment: Judgment normal.  
 
 
  
 
Labs:  
Recent Results (from the past 24 hour(s)) SAMPLES BEING HELD Collection Time: 01/10/21  5:24 PM  
Result Value Ref Range SAMPLES BEING HELD 1RED, 1BLU   
 COMMENT Add-on orders for these samples will be processed based on acceptable specimen integrity and analyte stability, which may vary by analyte. CBC WITH AUTOMATED DIFF Collection Time: 01/10/21  5:24 PM  
Result Value Ref Range WBC 7.0 3.6 - 11.0 K/uL  
 RBC 3.83 3.80 - 5.20 M/uL  
 HGB 11.4 (L) 11.5 - 16.0 g/dL HCT 34.6 (L) 35.0 - 47.0 % MCV 90.3 80.0 - 99.0 FL  
 MCH 29.8 26.0 - 34.0 PG  
 MCHC 32.9 30.0 - 36.5 g/dL  
 RDW 16.6 (H) 11.5 - 14.5 % PLATELET 253 834 - 198 K/uL MPV 11.3 8.9 - 12.9 FL  
 NRBC 0.0 0  WBC ABSOLUTE NRBC 0.00 0.00 - 0.01 K/uL NEUTROPHILS 86 (H) 32 - 75 % LYMPHOCYTES 7 (L) 12 - 49 % MONOCYTES 6 5 - 13 % EOSINOPHILS 0 0 - 7 % BASOPHILS 0 0 - 1 % IMMATURE GRANULOCYTES 1 (H) 0.0 - 0.5 % ABS. NEUTROPHILS 6.0 1.8 - 8.0 K/UL  
 ABS. LYMPHOCYTES 0.5 (L) 0.8 - 3.5 K/UL  
 ABS. MONOCYTES 0.4 0.0 - 1.0 K/UL  
 ABS. EOSINOPHILS 0.0 0.0 - 0.4 K/UL  
 ABS. BASOPHILS 0.0 0.0 - 0.1 K/UL  
 ABS. IMM. GRANS. 0.1 (H) 0.00 - 0.04 K/UL  
 DF SMEAR SCANNED    
 RBC COMMENTS ANISOCYTOSIS 
1+ METABOLIC PANEL, COMPREHENSIVE Collection Time: 01/10/21  5:24 PM  
Result Value Ref Range Sodium 130 (L) 136 - 145 mmol/L Potassium 5.2 (H) 3.5 - 5.1 mmol/L Chloride 98 97 - 108 mmol/L  
 CO2 25 21 - 32 mmol/L Anion gap 7 5 - 15 mmol/L Glucose 155 (H) 65 - 100 mg/dL BUN 52 (H) 6 - 20 MG/DL Creatinine 4.07 (H) 0.55 - 1.02 MG/DL  
 BUN/Creatinine ratio 13 12 - 20 GFR est AA 13 (L) >60 ml/min/1.73m2 GFR est non-AA 10 (L) >60 ml/min/1.73m2 Calcium 10.0 8.5 - 10.1 MG/DL Bilirubin, total 0.7 0.2 - 1.0 MG/DL  
 ALT (SGPT) 50 12 - 78 U/L  
 AST (SGOT) 50 (H) 15 - 37 U/L Alk. phosphatase 102 45 - 117 U/L Protein, total 8.7 (H) 6.4 - 8.2 g/dL Albumin 3.5 3.5 - 5.0 g/dL Globulin 5.2 (H) 2.0 - 4.0 g/dL A-G Ratio 0.7 (L) 1.1 - 2.2 LIPASE  Collection Time: 01/10/21  5:24 PM  
 Result Value Ref Range Lipase 153 73 - 507 U/L  
METABOLIC PANEL, BASIC Collection Time: 01/10/21  7:16 PM  
Result Value Ref Range Sodium 132 (L) 136 - 145 mmol/L Potassium 5.5 (H) 3.5 - 5.1 mmol/L Chloride 99 97 - 108 mmol/L  
 CO2 26 21 - 32 mmol/L Anion gap 7 5 - 15 mmol/L Glucose 147 (H) 65 - 100 mg/dL BUN 54 (H) 6 - 20 MG/DL Creatinine 3.88 (H) 0.55 - 1.02 MG/DL  
 BUN/Creatinine ratio 14 12 - 20 GFR est AA 13 (L) >60 ml/min/1.73m2 GFR est non-AA 11 (L) >60 ml/min/1.73m2 Calcium 9.8 8.5 - 10.1 MG/DL  
SARS-COV-2 Collection Time: 01/10/21  8:07 PM  
Result Value Ref Range Specimen source Nasopharyngeal    
 SARS-CoV-2 PENDING   
 SARS-CoV-2 PENDING Specimen source Nasopharyngeal    
 COVID-19 rapid test PENDING Specimen type NP Swab Health status PENDING   
 COVID-19 PENDING   
METABOLIC PANEL, COMPREHENSIVE Collection Time: 01/11/21  3:40 AM  
Result Value Ref Range Sodium 135 (L) 136 - 145 mmol/L Potassium 5.2 (H) 3.5 - 5.1 mmol/L Chloride 100 97 - 108 mmol/L  
 CO2 26 21 - 32 mmol/L Anion gap 9 5 - 15 mmol/L Glucose 107 (H) 65 - 100 mg/dL BUN 51 (H) 6 - 20 MG/DL Creatinine 3.04 (H) 0.55 - 1.02 MG/DL  
 BUN/Creatinine ratio 17 12 - 20 GFR est AA 18 (L) >60 ml/min/1.73m2 GFR est non-AA 15 (L) >60 ml/min/1.73m2 Calcium 9.2 8.5 - 10.1 MG/DL Bilirubin, total 0.9 0.2 - 1.0 MG/DL  
 ALT (SGPT) 44 12 - 78 U/L  
 AST (SGOT) 61 (H) 15 - 37 U/L Alk. phosphatase 84 45 - 117 U/L Protein, total 6.7 6.4 - 8.2 g/dL Albumin 2.6 (L) 3.5 - 5.0 g/dL Globulin 4.1 (H) 2.0 - 4.0 g/dL A-G Ratio 0.6 (L) 1.1 - 2.2    
CBC WITH AUTOMATED DIFF Collection Time: 01/11/21  3:40 AM  
Result Value Ref Range WBC 10.5 3.6 - 11.0 K/uL  
 RBC 3.58 (L) 3.80 - 5.20 M/uL  
 HGB 10.7 (L) 11.5 - 16.0 g/dL HCT 31.5 (L) 35.0 - 47.0 % MCV 88.0 80.0 - 99.0 FL  
 MCH 29.9 26.0 - 34.0 PG  
 MCHC 34.0 30.0 - 36.5 g/dL RDW 15.7 (H) 11.5 - 14.5 % PLATELET 849 218 - 587 K/uL MPV 9.9 8.9 - 12.9 FL  
 NRBC 0.0 0  WBC ABSOLUTE NRBC 0.00 0.00 - 0.01 K/uL NEUTROPHILS 83 (H) 32 - 75 % BAND NEUTROPHILS 3 0 - 6 % LYMPHOCYTES 8 (L) 12 - 49 % MONOCYTES 6 5 - 13 % EOSINOPHILS 0 0 - 7 % BASOPHILS 0 0 - 1 % IMMATURE GRANULOCYTES 0 %  
 ABS. NEUTROPHILS 9.1 (H) 1.8 - 8.0 K/UL  
 ABS. LYMPHOCYTES 0.8 0.8 - 3.5 K/UL  
 ABS. MONOCYTES 0.6 0.0 - 1.0 K/UL  
 ABS. EOSINOPHILS 0.0 0.0 - 0.4 K/UL  
 ABS. BASOPHILS 0.0 0.0 - 0.1 K/UL  
 ABS. IMM. GRANS. 0.0 K/UL  
 DF MANUAL PLATELET COMMENTS Large Platelets RBC COMMENTS ANISOCYTOSIS 1+ 
    
 RBC COMMENTS SCHISTOCYTES 1+ RBC COMMENTS TARGET CELLS 1+ RBC COMMENTS OVALOCYTES 1+ TSH 3RD GENERATION Collection Time: 01/11/21  3:40 AM  
Result Value Ref Range TSH 0.40 0.36 - 3.74 uIU/mL MAGNESIUM Collection Time: 01/11/21  3:40 AM  
Result Value Ref Range Magnesium 3.9 (H) 1.6 - 2.4 mg/dL PHOSPHORUS Collection Time: 01/11/21  3:40 AM  
Result Value Ref Range Phosphorus 4.8 (H) 2.6 - 4.7 MG/DL  
TROPONIN I Collection Time: 01/11/21  3:40 AM  
Result Value Ref Range Troponin-I, Qt. <0.05 <0.05 ng/mL HEMOGLOBIN A1C WITH EAG Collection Time: 01/11/21  3:40 AM  
Result Value Ref Range Hemoglobin A1c 5.3 4.0 - 5.6 % Est. average glucose 105 mg/dL Data Review images and reports reviewed Assessment:  
 
Principal Problem: 
  Small bowel obstruction ([address] (1/10/2021) Plan/Recommendations/Medical Decision Making:  
 
Continue present treatment I think patient would benefit from NG tube decompression and but this will also decrease the abdominal distention. Later this evening after NG tube decompression will add oral contrast to the NG tube and clamp the tube for 8 to 10 hours and follow-up abdominal x-rays tomorrow to see if contrast passes into the colon giving us prognostic information as to whether she will resolve this without surgery. 
 
If not improving may need surgical intervention.  Patient's only abdominal surgery was a hysterectomy she says her ovaries are still in place. 
Benefits risks alternatives reviewed with the patient 
 
 
Gideon Carranza MD , FACS 
[facility]

## 2021-01-11 NOTE — PROGRESS NOTES
[facility] [facility] Hospitalist Progress Note Valarie Gutierrez MD 
Answering service: [phone] OR [phone] from in house phone Date of Service:  2021 NAME:  Yahir Butler :  1935 MRN:  [id number] This documentation was facilitated by a Voice Recognition software and may contain inadvertent typographical errors. Admission Summary: This is an 37-year-old woman with past medical history significant for hypertension, consumptive thrombocytopenia status post splenectomy,presented to the ED and was admitted when she was found to have SBO on work up in the ED Interval history / Subjective: F/U SBO Patient lying in bed comfortably. She denied abdominal pain, nausea, vomiting or sob Assessment & Plan:  
Small bowel obstruction 
-CT A/P showed markedly distended, fluid-filled small bowel, stomach, and esophagus, compatible with distal small bowel obstruction. Massively distended urinary bladder, with no visualized mass or calculus. Patchy bibasilar airspace disease may represent pneumonia or aspiration. -NPO 
-IV fluids 
-General surgery on board,recommended conservative management Acute-on-chronic kidney disease stage V:suspect obstructive uropathy Acute urinary retention: 
-CT A/P showed massively distended urinary bladder, with no visualized mass or calculus. 
-Bhatt in place(1/10) -Monitor renal function daily,creatinine improving. Mild Hyponatremia and Hyperkalemia due to dehydration,carin: treat and monitor Suspected  Pneumonia,aspiration vs viral vs bacterial. CT a/p that showed patchy bibasilar airspace disease may represent pneumonia or aspiration. Patient afebrile and denied respiratory symptoms  
-Continue Rocephin and doxycycline. CT scan of the chest pending 
-SARS COV 2 pending  
-Maintain droplet plus isolation Hyperglycemia w/o history of DM. A1C 5. 3. Hyperglycemia likely due to acute illness Hypertension:stable. Hold home antihypertensive medications(hctz,amlodipine,and lisinopril). may use prn antihypertensive if BP persistently >160 Suspected seizure disorder:continue keppra Code status: full code DVT prophylaxis: scd(hold ac as she may need urgent surgery for the SBO) Care Plan discussed with: Patient/Family and Nurse Anticipated Disposition: Home w/Family Anticipated Discharge: Greater than 48 hours Hospital Problems  Date Reviewed: 1/10/2021 Codes Class Noted POA * (Principal) Small bowel obstruction ([facility] [address] ICD-10-CM: F21.081 ICD-9-CM: 560.9  1/10/2021 Yes Review of Systems: A comprehensive review of systems was negative except for that written in the HPI. Vital Signs:  
 Last 24hrs VS reviewed since prior progress note. Most recent are: 
Visit Vitals BP (!) (P) 132/57 (BP 1 Location: Left arm, BP Patient Position: At rest) Pulse (P) 81 Temp (P) 98.6 °F (37 °C) Resp (P) 18 Ht 5' 6\" (1.676 m) Wt 60.1 kg (132 lb 9.6 oz) SpO2 (P) 94% BMI 21.40 kg/m² Intake/Output Summary (Last 24 hours) at 1/11/2021 5296 Last data filed at 1/10/2021 2683 Gross per 24 hour Intake  Output 2400 ml Net -2400 ml Physical Examination:  
 
I had a face to face encounter with this patient and independently examined them on1/11/2021 as outlined below: 
     
Constitutional:  No acute distress, cooperative, pleasant HEENT:  Atraumatic. Oral mucosa moist,. Non icteric sclera. No pallor. Resp:  CTA bilaterally. No wheezing/rhonchi/rales. No accessory muscle use Chest Wall: No deformity CV:  Regular rhythm, normal rate, no murmurs, gallops, rubs GI:  Mildly distended,active bowel sounds,non tender. :  No CVA or suprapubic tenderness Musculoskeletal:  No edema, warm, 2+ pulses throughout  Neurologic:  Mental status:AAOx3,  
 Cranial nerves II-XII : WNL Motor exam:Moves all extremities symmetrically Data Review:  
 Review and/or order of clinical lab test 
Review and/or order of tests in the radiology section of CPT Review and/or order of tests in the medicine section of [facility] Labs:  
 
Recent Labs  
  01/11/21 
0340 01/10/21 
1724 WBC 10.5 7.0 HGB 10.7* 11.4* HCT 31.5* 34.6*  275 Recent Labs  
  01/11/21 
0340 01/10/21 
1916 01/10/21 
1724 * 132* 130*  
K 5.2* 5.5* 5.2*  
 99 98  
CO2 26 26 25 BUN 51* 54* 52* CREA 3.04* 3.88* 4.07* * 147* 155* CA 9.2 9.8 10.0 MG 3.9*  --   --   
PHOS 4.8*  --   --   
 
Recent Labs  
  01/11/21 
0340 01/10/21 
1724 ALT 44 50 AP 84 102 TBILI 0.9 0.7 TP 6.7 8.7* ALB 2.6* 3.5 GLOB 4.1* 5.2*  
LPSE  --  153 No results for input(s): INR, PTP, APTT, INREXT in the last 72 hours. No results for input(s): FE, TIBC, PSAT, FERR in the last 72 hours. No results found for: FOL, RBCF No results for input(s): PH, PCO2, PO2 in the last 72 hours. Recent Labs  
  01/11/21 0340 TROIQ <0.05 No results found for: CHOL, CHOLX, CHLST, CHOLV, HDL, HDLP, LDL, LDLC, DLDLP, TGLX, TRIGL, TRIGP, CHHD, CHHDX No results found for: Griffin Ibarra Lab Results Component Value Date/Time Color YELLOW 09/17/2012 05:25 PM  
 Appearance CLEAR 09/17/2012 05:25 PM  
 Specific gravity 1.010 09/17/2012 05:25 PM  
 pH (UA) 7.0 09/17/2012 05:25 PM  
 Protein NEGATIVE  09/17/2012 05:25 PM  
 Glucose NEGATIVE  09/17/2012 05:25 PM  
 Ketone NEGATIVE  09/17/2012 05:25 PM  
 Bilirubin NEGATIVE  09/17/2012 05:25 PM  
 Urobilinogen 1.0 09/17/2012 05:25 PM  
 Nitrites NEGATIVE  09/17/2012 05:25 PM  
 Leukocyte Esterase TRACE (A) 09/17/2012 05:25 PM  
 Epithelial cells 10-20 09/17/2012 05:25 PM  
 Bacteria NEGATIVE  09/17/2012 05:25 PM  
 WBC 5-10 09/17/2012 05:25 PM  
 RBC 3-5 09/17/2012 05:25 PM  
 
 
 
Medications Reviewed: Current Facility-Administered Medications Medication Dose Route Frequency  levETIRAcetam (KEPPRA) 500 mg in 0.9% sodium chloride 100 mL IVPB  500 mg IntraVENous Q12H  
 lactated Ringers infusion  125 mL/hr IntraVENous CONTINUOUS  
 HYDROmorphone (PF) (DILAUDID) injection 1 mg  1 mg IntraVENous Q4H PRN  
 sodium chloride (NS) flush 5-40 mL  5-40 mL IntraVENous Q8H  
 sodium chloride (NS) flush 5-40 mL  5-40 mL IntraVENous PRN  
 acetaminophen (TYLENOL) tablet 650 mg  650 mg Oral Q6H PRN Or  
 acetaminophen (TYLENOL) suppository 650 mg  650 mg Rectal Q6H PRN  promethazine (PHENERGAN) tablet 12.5 mg  12.5 mg Oral Q6H PRN Or  
 ondansetron (ZOFRAN) injection 4 mg  4 mg IntraVENous Q6H PRN  
 cefTRIAXone (ROCEPHIN) 1 g in 0.9% sodium chloride (MBP/ADV) 50 mL MBP  1 g IntraVENous Q24H  
 doxycycline (VIBRAMYCIN) 100 mg in 0.9% sodium chloride (MBP/ADV) 100 mL MBP  100 mg IntraVENous Q12H  
 
______________________________________________________________________ EXPECTED LENGTH OF STAY: - - - 
ACTUAL LENGTH OF STAY:          1 Latasha Zacarias MD

## 2021-01-11 NOTE — CONSULTS
Chief Complaint:  Distal small bowel obstruction HPI:  81 yo woman with hx HTN, splenectomy for consumptive thrombocytopenia consulted for distal small bowel obstruction. Pt reported symptoms developed 5 days ago with distention and nausea. No BM for past two days with abdominal pain. She was noted to have RICH in ER with bladder distention. CT scan showed distal small bowel obstruction likely due to adhesions and possible pneumonia. No fever or chills or leukocytosis. Past Medical History:  
Diagnosis Date  Hypertension  Seasonal allergic rhinitis  Spleen absent Past Surgical History:  
Procedure Laterality Date  VT ABDOMEN SURGERY PROC UNLISTED    
 spleenectomy No current facility-administered medications on file prior to encounter. Current Outpatient Medications on File Prior to Encounter Medication Sig Dispense Refill  hydroCHLOROthiazide (HYDRODIURIL) 25 mg tablet Take 25 mg by mouth daily.  danazoL (DANOCRINE) 200 mg capsule Take 1 Cap by mouth every other day. 90 Cap 3  
 naproxen sodium (ALEVE) 220 mg cap Take  by mouth.  amLODIPine (NORVASC) 2.5 mg tablet TAKE 1 TABLET ONCE BY MOUTH EVERY DAY  0  
 ciprofloxacin HCl (CIPRO) 250 mg tablet TAKE 1 TABLET BY MOUTH TWICE A DAY  0  
 FLUZONE HIGH-DOSE 2017-18, PF, syrg injection TO BE ADMINISTERED BY PHARMACIST FOR IMMUNIZATION  0  
 lisinopril (PRINIVIL, ZESTRIL) 20 mg tablet TAKE 1 TABLET BY MOUTH EVERY DAY 90 Tab 3  
 citalopram (CELEXA) 10 mg tablet  chlorhexidine (PERIDEX) 0.12 % solution USE AS DIRECTED ON BOTTLE  1  
 CALCIUM CARBONATE/VITAMIN D3 (CALCIUM + D PO) Take 2 Tabs by mouth daily. 1,200/1,000MG  levetiracetam (KEPPRA) 500 mg tablet Take 500 mg by mouth daily. Allergies Allergen Reactions  Aspirin Hives  Tramadol Seizures Review of Systems - General ROS: negative Psychological ROS: negative Respiratory ROS: negative Cardiovascular ROS: negative Gastrointestinal ROS: positive for - abdominal pain and nausea/vomiting Visit Vitals BP (!) 138/59 (BP 1 Location: Right arm, BP Patient Position: At rest) Pulse 84 Temp 97.8 °F (36.6 °C) Resp 18 Ht 5' 6\" (1.676 m) Wt 132 lb 9.6 oz (60.1 kg) SpO2 95% BMI 21.40 kg/m² Physical Exam:   
Gen:  NAD Pulm:  Unlabored Abd:  S/moderately distended/Non-TTP Recent Results (from the past 24 hour(s)) SAMPLES BEING HELD Collection Time: 01/10/21  5:24 PM  
Result Value Ref Range SAMPLES BEING HELD 1RED, 1BLU   
 COMMENT Add-on orders for these samples will be processed based on acceptable specimen integrity and analyte stability, which may vary by analyte. CBC WITH AUTOMATED DIFF Collection Time: 01/10/21  5:24 PM  
Result Value Ref Range WBC 7.0 3.6 - 11.0 K/uL  
 RBC 3.83 3.80 - 5.20 M/uL  
 HGB 11.4 (L) 11.5 - 16.0 g/dL HCT 34.6 (L) 35.0 - 47.0 % MCV 90.3 80.0 - 99.0 FL  
 MCH 29.8 26.0 - 34.0 PG  
 MCHC 32.9 30.0 - 36.5 g/dL  
 RDW 16.6 (H) 11.5 - 14.5 % PLATELET 041 827 - 485 K/uL MPV 11.3 8.9 - 12.9 FL  
 NRBC 0.0 0  WBC ABSOLUTE NRBC 0.00 0.00 - 0.01 K/uL NEUTROPHILS 86 (H) 32 - 75 % LYMPHOCYTES 7 (L) 12 - 49 % MONOCYTES 6 5 - 13 % EOSINOPHILS 0 0 - 7 % BASOPHILS 0 0 - 1 % IMMATURE GRANULOCYTES 1 (H) 0.0 - 0.5 % ABS. NEUTROPHILS 6.0 1.8 - 8.0 K/UL  
 ABS. LYMPHOCYTES 0.5 (L) 0.8 - 3.5 K/UL  
 ABS. MONOCYTES 0.4 0.0 - 1.0 K/UL  
 ABS. EOSINOPHILS 0.0 0.0 - 0.4 K/UL  
 ABS. BASOPHILS 0.0 0.0 - 0.1 K/UL  
 ABS. IMM. GRANS. 0.1 (H) 0.00 - 0.04 K/UL  
 DF SMEAR SCANNED    
 RBC COMMENTS ANISOCYTOSIS 
1+ METABOLIC PANEL, COMPREHENSIVE Collection Time: 01/10/21  5:24 PM  
Result Value Ref Range Sodium 130 (L) 136 - 145 mmol/L Potassium 5.2 (H) 3.5 - 5.1 mmol/L Chloride 98 97 - 108 mmol/L  
 CO2 25 21 - 32 mmol/L Anion gap 7 5 - 15 mmol/L Glucose 155 (H) 65 - 100 mg/dL  BUN 52 (H) 6 - 20 MG/DL  
 Creatinine 4.07 (H) 0.55 - 1.02 MG/DL  
 BUN/Creatinine ratio 13 12 - 20 GFR est AA 13 (L) >60 ml/min/1.73m2 GFR est non-AA 10 (L) >60 ml/min/1.73m2 Calcium 10.0 8.5 - 10.1 MG/DL Bilirubin, total 0.7 0.2 - 1.0 MG/DL  
 ALT (SGPT) 50 12 - 78 U/L  
 AST (SGOT) 50 (H) 15 - 37 U/L Alk. phosphatase 102 45 - 117 U/L Protein, total 8.7 (H) 6.4 - 8.2 g/dL Albumin 3.5 3.5 - 5.0 g/dL Globulin 5.2 (H) 2.0 - 4.0 g/dL A-G Ratio 0.7 (L) 1.1 - 2.2 LIPASE Collection Time: 01/10/21  5:24 PM  
Result Value Ref Range Lipase 153 73 - 361 U/L  
METABOLIC PANEL, BASIC Collection Time: 01/10/21  7:16 PM  
Result Value Ref Range Sodium 132 (L) 136 - 145 mmol/L Potassium 5.5 (H) 3.5 - 5.1 mmol/L Chloride 99 97 - 108 mmol/L  
 CO2 26 21 - 32 mmol/L Anion gap 7 5 - 15 mmol/L Glucose 147 (H) 65 - 100 mg/dL BUN 54 (H) 6 - 20 MG/DL Creatinine 3.88 (H) 0.55 - 1.02 MG/DL  
 BUN/Creatinine ratio 14 12 - 20 GFR est AA 13 (L) >60 ml/min/1.73m2 GFR est non-AA 11 (L) >60 ml/min/1.73m2 Calcium 9.8 8.5 - 10.1 MG/DL  
SARS-COV-2 Collection Time: 01/10/21  8:07 PM  
Result Value Ref Range Specimen source Nasopharyngeal    
 SARS-CoV-2 PENDING   
 SARS-CoV-2 PENDING Specimen source Nasopharyngeal    
 COVID-19 rapid test PENDING Specimen type NP Swab Health status PENDING   
 COVID-19 PENDING   
 
 
AP:  83 yo woman with distal small bowel obstruction - Distal small bowel obstruction:  No acute indication for operation. Recommend IV fluid hydration and bowel rest 
- NPO With ice chips - Labs in am 
- Repeat AXR in am 
- Encourage ambulation if able

## 2021-01-11 NOTE — ROUTINE PROCESS
TRANSFER - OUT REPORT: 
 
Verbal report given to Daomn Márquez RN (name) on Zahida Wallace  being transferred to [facility](unit) for routine progression of care Report consisted of patients Situation, Background, Assessment and  
Recommendations(SBAR). Information from the following report(s) SBAR, ED Summary, Intake/Output and Recent Results was reviewed with the receiving nurse. Lines:  
Peripheral IV 01/10/21 Left Antecubital (Active) Site Assessment Clean, dry, & intact 01/10/21 1953 Phlebitis Assessment 0 01/10/21 1953 Dressing Status Clean, dry, & intact 01/10/21 1953 Hub Color/Line Status Pink 01/10/21 1953 Peripheral IV 01/10/21 Right Forearm (Active) Site Assessment Clean, dry, & intact 01/10/21 1954 Phlebitis Assessment 0 01/10/21 1954 Infiltration Assessment 0 01/10/21 1954 Dressing Status Clean, dry, & intact 01/10/21 1954 Hub Color/Line Status Pink 01/10/21 1954 Opportunity for questions and clarification was provided. Patient transported with: 
 Registered Nurse

## 2021-01-11 NOTE — CONSULTS
Requesting Provider: Dipika Anderson MD - Reason for Consultation: Ryan Axon retention\" Pre-existing [facility] Patient:   Yes Patient: Campos Cook MRN: [id number]  SSN: [id number] YOB: 1935  Age: 82 y.o. Sex: female Location: [facility]/01 Code Status: Full Code PCP: Jeannette Palacios MD  - [phone] Emergency Contact:  Primary Emergency Contact: Meet Royal, Home Phone: [phone] Race/Taoism/Language: BLACK OR  / Confucianism / Speaks ENGLISH Payor: Payor: Burak Latvian / Plan: [place] MEDICARE PART A & B / Product Type: Medicare /   
Prior Admission Data: 6/27/16 [facility] EMERGENCY DEPT Hospitalized:  Hospital Day: 2 - Admitted 1/10/2021  5:36 PM  
 
CONSULTANTS 
IP CONSULT TO NEPHROLOGY 
IP CONSULT TO UROLOGY ADMISSION DIAGNOSES 
  ICD-10-CM ICD-9-CM 1. SBO (small bowel obstruction) ([facility] [address]  K56.609 560.9 2. Abdominal pain, generalized  R10.84 789.07  
3. Constipation, unspecified constipation type  K59.00 564.00 4. RICH (acute kidney injury) ([facility] [address]  N17.9 584.9 5. Urinary retention  R33.9 788.20 Assessment/Plan: · Urinary Retention · Urethral stenosis · [facility] 
 
- Maintain Bhatt. - Monitor kidney function. Nephrology consult pending.  
- Recommend follow-up with [place] [facility] upon discharge to discuss management of retention 2/2 urethral stricture. She may now require a SP tube. Maintain Bhatt at [place].  
- FU scheduled 1/21/21 at 10:20 AM with Dr. Jolynn Hubbard at the [facility].  
- Will sign off. Please call with questions or concerns. [phone]. Case discussed with Dr. Sahra Bryan. CC: Abdominal Pain HPI: She is a 82 y.o. female with past medical history significant for hypertension, consumptive thrombocytopenia status post splenectomy who presented to the ED on 1/10/21 and was admitted for a SBO. Gen surgery following. Urology consulted for urinary retention. Last seen by Dr. Bina Lyles at [facility] in 07/2018. See last OV Note below. Hx of urethral stenosis and incomplete bladder emptying. Plan was to trial intermittent catheterization and place SP tube if she failed. She did not return to the office. CT reviewed, revealing massive bladder distension w/o hydro. A collado was placed in the ED on 1/10 with 2400 cc of recorded UOP. It is unclear if this was the total shift volume or initial UOP after catheter placement. The patient reports that she was unable to self cath at home. She reports voiding at home \"occasionally\" and denies sensation of incomplete bladder emptying. She denies suprapubic pain, hematuria, dysuria, or frequency. 12 F collado in place with yellow UA with sediment. Abdomen soft, mild tenderness to palpation. No CVA tenderness. AFVSS 
WBC 10.5 Hgb 10.7 Cr 4.07 -->3.04 
COVID pending +Rocephin, doxy CT A/P WO 1/10/21: 
KIDNEYS/URETERS: No calculus or hydronephrosis. URINARY BLADDER: Massively distended, with no visualized mass or calculus. IMPRESSION: 
Markedly distended, fluid-filled small bowel, stomach, and esophagus, compatible 
with distal small bowel obstruction. Massively distended urinary bladder, with 
no visualized mass or calculus.  Patchy bibasilar airspace disease may represent 
pneumonia or aspiration. 
 
====Last OV note 07/2018====== 
 Julisa Izquierdo is an [de-identified]year old [de-identified] or [facility] American female who presents today for \"Frequent Urination; urethral stenosis \". She returns for follow-up. Mrs. Tena Carranza came back in today for further assessment of her urethral stenosis. Unfortunately she has no urinary complaints but is emptying her bladder poorly. Her postvoid residual was over 400 cc set her back out to try anterior bladder and she really could not do much better. We tried to teach her intermittent catheterization but I am not sure she will be able to do this from a cognitive as well as physical standpoint. Options would be to consider dilating her and putting a catheter in for a while to see if we can keep her urethra more open. Also, we could consider a chronic suprapubic tube. We try to teach her how to do intermittent catheterization today and did drain her bladder so she is instructed to try this and if she is not able to do so could come back to the nurse whereupon we may go ahead and get her set up for a formal urethral dilation under anesthesia and catheter placement versus suprapubic tube placement. PAST MEDICAL HISTORY: 
 
Allergies: * PROPOXNAPA 100 (Critical) DARVOCET (Critical) * TRAMADOL (Critical) DENIES: Latex, Shellfish, X-Ray Dye, Iodine. Medications: BACTRIM -160 MG ORAL TABLET (SULFAMETHOXAZOLE-TRIMETHOPRIM) one PO BID 
LISINOPRIL 20 MG ORAL TABLET (LISINOPRIL) qd DANAZOL 200 MG ORAL CAPSULE (DANAZOL) 1 cap every other day ALEVE TABLET (NAPROXEN SODIUM TABS) prn LEVETIRACETAM 500 MG ORAL TABLET (LEVETIRACETAM) qd CALCIUM CARBONATE-VITAMIN D3 TABLET (CALCIUM CARB-CHOLECALCIFEROL TABS) 1200mg/ qd 
 
Problems: Urethral stenosis (ICD-598.9) (GRW65-X33.9) Incomplete bladder emptying (ICD-788.21) (VRC33-E33.14) Urinary frequency, chronic (ICD-788.41) (FZT88-W49.0) Illnesses: High Blood Pressure and Stroke/Seizure. DENIES: Heart Disease, Pacemaker/Defibrillator, Lung Disease, Diabetes, Bowel Problems, Kidney Problems, Bleeding Problems, HIV, Hepatitis, or Cancer. Surgeries: Hysterectomy and Ovary Removed (One). Family History: DENIES: Kidney cancer, Kidney disease, Kidney stones, Breast cancer, Uterine cancer, Cervical cancer, Ovarian cancer. Social History: Retired. . Smoking status: never smoker. Does not drink alcohol. System Review: Admits to: Dry Eyes, Leg Swelling, and Easy Bleeding. DENIES: Unexplained Weight Loss, Dry Mouth, Shortness of Breath, Constipation, Involuntary Urine Loss, Lower Extremity Weakness, Dry Skin, Difficulty Walking, Psychiatric Problems, Impaired Sex Drive, Rash. URINALYSIS from Voided specimen Urine Micro not done POST-VOID RESIDUAL 
US PVR (2018):  410 ccs IMPRESSION: 
 
1. URETHRAL STENOSIS (CQM01-T61.9) 2. INCOMPLETE BLADDER EMPTYING (VCQ28-N19.14) 3. URINARY FREQUENCY, CHRONIC (ONE05-Z66.0) PLAN: Trial of intermittent catheterization and if she fails consider treating her under anesthesia with catheter placement and possible suprapubic tube placement. cc: [employer] 
Transcribed by [employer] Bladder Scan, E&M Service Upcoming Orders Return Office Visit -  PRN If she fails intermittent catheterization; she will come in sooner otherwise perhaps the visit in 6 months. 
 
 
 
] Electronically signed by Jaxon Dorsey MD on 2018 at 5:31 PM 
 
________________________________________________________________________ Temp (24hrs), Av.2 °F (36.8 °C), Min:97.8 °F (36.6 °C), Max:98.6 °F (37 °C) Urinary Status: Draining, Bhatt Creatinine Date/Time Value Ref Range Status 2021 03:40 AM 3.04 (H) 0.55 - 1.02 MG/DL Final  
  Comment:  
  INVESTIGATED PER DELTA CHECK PROTOCOL  
01/10/2021 07:16 PM 3.88 (H) 0.55 - 1.02 MG/DL Final  
 01/10/2021 05:24 PM 4.07 (H) 0.55 - 1.02 MG/DL Final  
08/17/2020 12:11 PM 1.40 (H) 0.57 - 1.00 mg/dL Final  
04/08/2019 10:26 AM 1.11 (H) 0.57 - 1.00 mg/dL Final  
 
Current Antimicrobial Therapy (168h ago, onward) Ordered     Start Stop  
 01/10/21 2036  doxycycline (VIBRAMYCIN) 100 mg in 0.9% sodium chloride (MBP/ADV) 100 mL MBP  100 mg,   IntraVENous,   EVERY 12 HOURS    
 01/10/21 2200 01/15/21 2159  
 01/10/21 2036  cefTRIAXone (ROCEPHIN) 1 g in 0.9% sodium chloride (MBP/ADV) 50 mL MBP  1 g,   IntraVENous,   EVERY 24 HOURS    
 01/10/21 0400 01/15/21 0359 Key Anti-Platelet Anticoagulant Meds The patient is on no antiplatelet meds or anticoagulants. Diet: DIET NPO With [employer] - Labs Lab Results Component Value Date/Time WBC 10.5 01/11/2021 03:40 AM  
 HCT 31.5 (L) 01/11/2021 03:40 AM  
 PLATELET 573 10/79/8338 03:40 AM  
 Sodium 135 (L) 01/11/2021 03:40 AM  
 Potassium 5.2 (H) 01/11/2021 03:40 AM  
 Chloride 100 01/11/2021 03:40 AM  
 CO2 26 01/11/2021 03:40 AM  
 BUN 51 (H) 01/11/2021 03:40 AM  
 Creatinine 3.04 (H) 01/11/2021 03:40 AM  
 Glucose 107 (H) 01/11/2021 03:40 AM  
 Calcium 9.2 01/11/2021 03:40 AM  
 Magnesium 3.9 (H) 01/11/2021 03:40 AM  
 INR 1.0 06/27/2016 05:22 PM  
 
UA:  
Lab Results Component Value Date/Time Color YELLOW 09/17/2012 05:25 PM  
 Appearance CLEAR 09/17/2012 05:25 PM  
 Specific gravity 1.010 09/17/2012 05:25 PM  
 pH (UA) 7.0 09/17/2012 05:25 PM  
 Protein NEGATIVE  09/17/2012 05:25 PM  
 Glucose NEGATIVE  09/17/2012 05:25 PM  
 Ketone NEGATIVE  09/17/2012 05:25 PM  
 Bilirubin NEGATIVE  09/17/2012 05:25 PM  
 Urobilinogen 1.0 09/17/2012 05:25 PM  
 Nitrites NEGATIVE  09/17/2012 05:25 PM  
 Leukocyte Esterase TRACE (A) 09/17/2012 05:25 PM  
 Epithelial cells 10-20 09/17/2012 05:25 PM  
 Bacteria NEGATIVE  09/17/2012 05:25 PM  
 WBC 5-10 09/17/2012 05:25 PM  
 RBC 3-5 09/17/2012 05:25 PM  
 
Imaging Results for orders placed during the hospital encounter of 01/10/21 CT ABD PELV WO CONT Narrative EXAM: CT ABD PELV WO CONT INDICATION: abdominal pain, constipation. has RICH, can't get contrast.  hx of 
splenectomy COMPARISON: None CONTRAST:  None. TECHNIQUE:  
Thin axial images were obtained through the abdomen and pelvis. Coronal and 
sagittal reformats were generated. Oral contrast was not administered. CT dose 
reduction was achieved through use of a standardized protocol tailored for this 
examination and automatic exposure control for dose modulation. The absence of intravenous contrast material reduces the sensitivity for 
evaluation of the vasculature and solid organs. FINDINGS:  
LOWER THORAX: Patchy bibasilar airspace disease. Dilated, fluid-filled 
esophagus. LIVER: No mass. BILIARY TREE: Gallbladder is within normal limits. CBD is not dilated. SPLEEN: Surgically absent. PANCREAS: No focal abnormality. ADRENALS: Unremarkable. KIDNEYS/URETERS: No calculus or hydronephrosis. STOMACH: Distended and fluid-filled. SMALL BOWEL: Diffuse small bowel distention with air-fluid levels. COLON: No dilatation or wall thickening. APPENDIX: Not visualized. PERITONEUM: No ascites or pneumoperitoneum. RETROPERITONEUM: No lymphadenopathy or aortic aneurysm. REPRODUCTIVE ORGANS: Status post hysterectomy. URINARY BLADDER: Massively distended, with no visualized mass or calculus. BONES: No destructive bone lesion. Degenerative disc disease is present 
throughout the thoracolumbar spine. ABDOMINAL WALL: No mass or hernia. ADDITIONAL COMMENTS: N/A Impression IMPRESSION: 
Markedly distended, fluid-filled small bowel, stomach, and esophagus, compatible 
with distal small bowel obstruction. Massively distended urinary bladder, with 
no visualized mass or calculus. Patchy bibasilar airspace disease may represent 
pneumonia or aspiration.   
 
 
US Results (most recent): 
 No results found for this or any previous visit. Cultures All Micro Results None Past History: (Complete 2+/3 areas) Allergies Allergen Reactions  Aspirin Hives  Tramadol Seizures Current Facility-Administered Medications Medication Dose Route Frequency  levETIRAcetam (KEPPRA) 500 mg in 0.9% sodium chloride 100 mL IVPB  500 mg IntraVENous Q12H  
 diatrizoate jose r-diatrizoat sod (MD-GASTROVIEW,GASTROGRAFIN) 66-10 % contrast solution 100 mL  100 mL Oral RAD ONCE  
 lactated Ringers infusion  125 mL/hr IntraVENous CONTINUOUS  
 HYDROmorphone (PF) (DILAUDID) injection 1 mg  1 mg IntraVENous Q4H PRN  
 sodium chloride (NS) flush 5-40 mL  5-40 mL IntraVENous Q8H  
 sodium chloride (NS) flush 5-40 mL  5-40 mL IntraVENous PRN  
 acetaminophen (TYLENOL) tablet 650 mg  650 mg Oral Q6H PRN Or  
 acetaminophen (TYLENOL) suppository 650 mg  650 mg Rectal Q6H PRN  promethazine (PHENERGAN) tablet 12.5 mg  12.5 mg Oral Q6H PRN Or  
 ondansetron (ZOFRAN) injection 4 mg  4 mg IntraVENous Q6H PRN  
 cefTRIAXone (ROCEPHIN) 1 g in 0.9% sodium chloride (MBP/ADV) 50 mL MBP  1 g IntraVENous Q24H  
 doxycycline (VIBRAMYCIN) 100 mg in 0.9% sodium chloride (MBP/ADV) 100 mL MBP  100 mg IntraVENous Q12H Prior to Admission medications Medication Sig Start Date End Date Taking? Authorizing Provider  
hydroCHLOROthiazide (HYDRODIURIL) 25 mg tablet Take 25 mg by mouth daily. Provider, Historical  
danazoL (DANOCRINE) 200 mg capsule Take 1 Cap by mouth every other day. 8/21/20   Mine Landry MD  
naproxen sodium (ALEVE) 220 mg cap Take  by mouth.     Provider, Historical  
amLODIPine (NORVASC) 2.5 mg tablet TAKE 1 TABLET ONCE BY MOUTH EVERY DAY 4/2/19   Provider, Historical  
ciprofloxacin HCl (CIPRO) 250 mg tablet TAKE 1 TABLET BY MOUTH TWICE A DAY 3/12/18   Provider, Historical  
 FLUZONE HIGH-DOSE 2017-18, PF, syrg injection TO BE ADMINISTERED BY PHARMACIST FOR IMMUNIZATION 2/3/18   Provider, Historical  
lisinopril (PRINIVIL, ZESTRIL) 20 mg tablet TAKE 1 TABLET BY MOUTH EVERY DAY 3/15/18   Clinton Estrada MD  
citalopram (CELEXA) 10 mg tablet  8/7/17   Provider, Historical  
chlorhexidine (PERIDEX) 0.12 % solution USE AS DIRECTED ON BOTTLE 5/26/17   Provider, Historical  
CALCIUM CARBONATE/VITAMIN D3 (CALCIUM + D PO) Take 2 Tabs by mouth daily. 1,200/1,000MG    Provider, Historical  
levetiracetam (KEPPRA) 500 mg tablet Take 500 mg by mouth daily. Provider, Historical  
  
 
PMHx:  has a past medical history of Hypertension, Seasonal allergic rhinitis, and Spleen absent. She also has no past medical history of Diabetes ([address] or Hyperlipidemia. PSurgHx:  has a past surgical history that includes pr abdomen surgery proc unlisted. PSocHx:  reports that she has never smoked. She has never used smokeless tobacco. She reports that she does not drink alcohol or use drugs. ROS:  (Complete - 10 systems) - DENIES: Weightloss (Constitutional), Dry mouth (ENMT), Chest pain (CV), SOB (Respiratory), Constipation (GI), Weakness (MS), Pallor (Skin), TIA Sx (Neuro), Confusion (Psych), Easy bruising (Heme) Physical Exam: (Comprehesive - 8+ 1995 Systems)  
 
(1) Constitutional:  FIO2:   on SpO2: O2 Sat (%): 95 % O2 Device: Room air Patient Vitals for the past 24 hrs: 
 BP Temp Pulse Resp SpO2 Height Weight 01/11/21 1015 (!) 135/57 98.3 °F (36.8 °C) 81 18 95 %    
01/11/21 0332 (!) 132/57 98.6 °F (37 °C) 81 18 94 %    
01/10/21 2111 (!) 138/59 97.8 °F (36.6 °C) 84 18 95 %    
01/10/21 1718 119/72 98.2 °F (36.8 °C) 98 26 95 % 5' 6\" (1.676 m) 60.1 kg (132 lb 9.6 oz) Date 01/10/21 0700 - 01/11/21 4909 01/11/21 0700 - 01/12/21 6707 Shift 8764-2150 1795-9646 24 Hour Total 4995-0766 5813-0170 24 Hour Total  
INTAKE Shift Total(mL/kg) OUTPUT  
 Urine(mL/kg/hr)  3536(3.6) 2400(1.7) Urine Output (mL) (Urinary Catheter 01/10/21 Coude; Bhatt)  2400 2400 Emesis/NG output    700  700 Output (ml) (Nasogastric Tube 01/11/21)    700  700 Shift Total(mL/kg)  3893(61.7) 0010(68.1) 700(11.6)  700(11.6) NET  -2400 -2400 -700  -700 Weight (kg) 60.1 60.1 60.1 60.1 60.1 60.1  
  
(2) ENMT:   moist mucous membranes, normal sinuses (3) Respiratory:  breathing easily, no distress (4) GI:  no abdominal masses, tenderness  
(5) :   normal  
(6) Lymphatic:  no adenopathy, neck supple  
(7) Muscloskeletal:  no gross deformity, normal ROM  
(8) Skin:  no rash, warm & dry  
(9) Neuro:  no focal deficits, normal speech Signed By: Macarena Hdez NP  - January 11, 2021

## 2021-01-11 NOTE — PROGRESS NOTES
Bedside shift change report given to Ivan Ryan (oncoming nurse) by Cyndy Valles RN (offgoing nurse). Report included the following information SBAR, Kardex and MAR.

## 2021-01-11 NOTE — H&P
[address] HISTORY AND PHYSICAL Name:  Cindy Booker 
MR#:  [id number] :  1935 ACCOUNT #:  [de-identified] ADMIT DATE:  01/10/2021 The patient was seen, evaluated and admitted by me on 01/10/2021. PRIMARY CARE PHYSICIAN 
 
SOURCE OF INFORMATION:  The patient who is not a good historian. Review of ED medical record. CHIEF COMPLAINT:  Abdominal pain. HISTORY OF PRESENT ILLNESS:  This is an 72-year-old woman with past medical history significant for hypertension, consumptive thrombocytopenia status post splenectomy, who in her usual state of health until about 4 days ago when the patient developed abdominal pain. The pain is located at the periumbilical region, constant, dull ache with no radiation, 6/10 in severity, no known aggravating or relieving factors. The abdominal pain is associated with constipation with no nausea or vomiting. The patient took over-the-counter laxative for the constipation without any relief. No prior history of abdominal pain. The onset of the abdominal pain was very gradual.  She came to the emergency room for further evaluation. When the patient arrived at the emergency room, CT scan of the abdomen and pelvis shows findings consistent with small bowel obstruction as well as urinary retention and possible pneumonia. The emergency room physician consulted general surgeon on-call and the patient was subsequently referred to the hospitalist service for evaluation for admission. The patient has no fever, no rigor, no chills. The patient denies sick contact or contact with any person with COVID-19 virus infection. No record of prior admission to this hospital. 
 
PAST SURGICAL HISTORY:  Hypertension, consumptive thrombocytopenia status post splenectomy. ALLERGIES:  THE PATIENT IS ALLERGIC TO ASPIRIN AND TRAMADOL. MEDICATIONS: 
1.  Norvasc 2.5 mg daily. 2.  Calcium carbonate 2 tablets daily. 3.  Keppra 500 mg daily. 4.  Lisinopril 20 mg daily. 5.  Hydrochlorothiazide 25 mg daily. FAMILY HISTORY:  This was reviewed. No family history of gastrointestinal disease. PAST SURGICAL HISTORY:  This is significant for splenectomy. SOCIAL HISTORY:  No history of alcohol or tobacco abuse. REVIEW OF SYSTEMS: 
HEAD, EYES, EARS, NOSE, AND THROAT:  No headache, no dizziness, no blurring of vision, no photophobia. RESPIRATORY SYSTEM:  No cough, no shortness of breath, no hemoptysis. CARDIOVASCULAR SYSTEM:  No chest pain, no orthopnea, no palpitations. GASTROINTESTINAL SYSTEM:  This is positive for abdominal pain and constipation. No nausea, no vomiting, no diarrhea. GENITOURINARY SYSTEM:  This is positive for dysuria. No urgency, no frequency. All other systems are reviewed and they are negative. PHYSICAL EXAMINATION: 
GENERAL APPEARANCE:  The patient appeared ill, in moderate distress. VITAL SIGNS:  On arrival at the emergency room; temperature 98.2, pulse 98, respiratory rate 26, blood pressure 119/72, oxygen saturation 95% on room. HEENT:  Head:  Normocephalic, atraumatic. Eyes:  Normal eye movement. No redness, no drainage, no discharge. Ears:  Normal external ears with no evidence of drainage. Nose:  No deformity, no drainage. Mouth and Throat:  No visible oral lesion. Dry oral mucosa. NECK:  Neck is supple. No JVD. No thyromegaly. CHEST:  Clear breath sounds. No wheezing, no crackles. HEART:  Normal S1 and S2, regular. No clinically appreciable murmur. ABDOMEN:  Diffuse tenderness. No rebound tenderness, no guarding. Reduced bowel sounds. CNS:  Alert and oriented x3. No gross focal neurological deficit. EXTREMITIES:  No edema. Pulses 2+ bilaterally. MUSCULOSKELETAL SYSTEM:  No obvious joint deformity or swelling. SKIN:  No active skin lesions seen in the exposed part of the body. PSYCHIATRY:  Normal mood and affect. LYMPHATIC SYSTEM:  No cervical lymphadenopathy. DIAGNOSTIC DATA:  Abdominal x-rays shows small bowel loop dilatation may reflect ileus or early/partial small bowel obstruction. CT scan of the abdomen and pelvis without contrast shows markedly distended fluid thrill in small bowel, stomach and esophagus compatible with distal small bowel obstruction. Massively distended urinary bladder with no visualized mass or calculus. Partial bibasilar airspace disease may represent pneumonia or aspiration. LABORATORY DATA:  Sodium 130, potassium 5.2, chloride 98, CO2 of 25, glucose 155, BUN 52, creatinine 4.07, calcium , total bilirubin 0.7, ALT 50, AST 80, alkaline phosphatase 102, total protein 8.7, albumin level 3.5, globulin 5.2. Lipase level 153. Hematology:  WBC 7.0, hemoglobin 14.4, hematocrit 34.6, platelets 945. ASSESSMENT: 
1.  Small bowel obstruction. 2.  Acute-on-chronic kidney disease stage V. 
3.  Hyperkalemia. 4.  Suspected bacterial pneumonia. 5.  Hyperglycemia. 6.  Hyponatremia. 7.  Hypertension. 8.  Acute urinary retention. 9.  Suspected seizure disorder. PLAN: 
1. Small bowel obstruction: We will admit the patient for further evaluation and treatment. We will carry out conservative treatment which will include fluid therapy and pain control. The patient will also be placed on n.p.o. We will await further recommendation from the general surgeon. This is the most likely cause of the patient's abdominal pain. 2.  Acute-on-chronic kidney disease stage V:  The patient has significant jump in creatinine, most likely there is an element of obstructive uropathy. A Bhatt catheter was inserted in the emergency room. We will carry out fluid therapy and Nephrology consult will be requested to assist in further evaluation and treatment. 3.  Hyperkalemia: This was reported as hemolyzed sample. We will repeat potassium level. 4.  Suspected bacterial pneumonia: We will start the patient on Rocephin and doxycycline. We will obtain CT scan of the chest for further evaluation. 5.  Hyperglycemia: We will check hemoglobin A1c level. The patient has no history of diabetes. 6.  Hyponatremia: This is most likely due to volume depletion. We will carry out fluid therapy. We will await result of the CT scan of the chest to evaluate the patient for mass lesion. 7.  Hypertension: We will hold hydrochlorothiazide because of the acute-on-chronic kidney function and the patient's blood pressure closely. We will check TSH level. 8.  Acute urinary retention:  As stated above, there is a concern for obstructive uropathy. Bhatt catheter was inserted in the emergency room. We will continue with Bhatt catheter care. Urology consult will be requested to assist in further evaluation and treatment. 9.  Suspected seizure disorder:  The patient is on Keppra at home. She could not tell the reason why she is taking the Keppra. I am suspecting that the patient is on Keppra for seizure. We will continue with Keppra. OTHER ISSUES:  Code status: The patient is a full code. We will place the patient on heparin for heparin for DVT prophylaxis. FUNCTIONAL STATUS PRIOR TO ADMISSION:  The patient came from home. The patient is ambulatory with no assistant or device. COVID PRECAUTION:  The patient was wearing a face mask. I was wearing a face mask and gloves for this patient's encounter. MD QAMAR Andrade/[email or web address]/[email or web address] 
D:  01/11/2021 3:27 T:  01/11/2021 5:27 JOB #:  [id number] CC:   (Delete CC field if not dictated.)

## 2021-01-11 NOTE — CONSULTS
Seen and examined Thanks for the consult A/P; 
CKD-3 RICH,2nd urinary retention ;collado in;she had chronic Urethral stricture SBO Mild hyperkalemia Switch IVF to NS 
UA Keep collado Urology seeing Discussed with RN

## 2021-01-12 NOTE — PROGRESS NOTES
General Surgery Daily Progress Note Admit Date: 1/10/2021 Post-Operative Day: * No surgery found * from * No surgery found * Subjective:  
 
Last 24 hrs: Pt w/ little ng output since placed. She denies flatus but has some abd rumbling. Had a lg BM last night after gastrograffin given Xray shows distended loops of small bowel and contrast in the colon Objective:  
 
Blood pressure 126/63, pulse 68, temperature 98.6 °F (37 °C), resp. rate 18, height 5' 6\" (1.676 m), weight 63.5 kg (140 lb), SpO2 98 %. Temp (24hrs), Av.1 °F (37.3 °C), Min:98.6 °F (37 °C), Max:99.9 °F (37.7 °C) 
 
 
_____________________ Physical Exam:    
Alert and Oriented, x3, in no acute distress. Cardiovascular: RRR, no peripheral edema Abdomen: soft, hypoactive BS heard mostly in lower quadrants Assessment:  
Principal Problem: 
  Small bowel obstruction ([address] (1/10/2021) Plan:  
 
May clamp ngt Trial of sips of clears Will reassess later today for tolerance and hopefully d/c ng 
Renal following - Cr improving Urology following - collado for urethral stricture Data Review: 
 
Recent Labs  
  21 
01121 
0340 01/10/21 
1724 WBC 6.5 10.5 7.0 HGB 9.7* 10.7* 11.4* HCT 28.8* 31.5* 34.6*  272 275 Recent Labs  
  21 
0115 21 
0340 01/10/21 
1916 01/10/21 
1724  135* 132* 130*  
K 4.3 5.2* 5.5* 5.2*  
 100 99 98  
CO2 28 26 26 25 GLU 86 107* 147* 155* BUN 45* 51* 54* 52* CREA 1.69* 3.04* 3.88* 4.07* CA 8.6 9.2 9.8 10.0 MG  --  3.9*  --   --   
PHOS  --  4.8*  --   --   
ALB 2.3* 2.6*  --  3.5 ALT 36 44  --  50 Recent Labs  
  01/10/21 
1724 LPSE 153  
 
 
 
 
______________________ Medications: 
 
Current Facility-Administered Medications Medication Dose Route Frequency  dextrose 5% and 0.9% NaCl infusion  100 mL/hr IntraVENous CONTINUOUS  
 levETIRAcetam (KEPPRA) 500 mg in 0.9% sodium chloride 100 mL IVPB  500 mg IntraVENous Q12H • HYDROmorphone (PF) (DILAUDID) injection 1 mg  1 mg IntraVENous Q4H PRN  
• sodium chloride (NS) flush 5-40 mL  5-40 mL IntraVENous Q8H  
• sodium chloride (NS) flush 5-40 mL  5-40 mL IntraVENous PRN  
• acetaminophen (TYLENOL) tablet 650 mg  650 mg Oral Q6H PRN  
 Or  
• acetaminophen (TYLENOL) suppository 650 mg  650 mg Rectal Q6H PRN  
• promethazine (PHENERGAN) tablet 12.5 mg  12.5 mg Oral Q6H PRN  
 Or  
• ondansetron (ZOFRAN) injection 4 mg  4 mg IntraVENous Q6H PRN  
• cefTRIAXone (ROCEPHIN) 1 g in 0.9% sodium chloride (MBP/ADV) 50 mL MBP  1 g IntraVENous Q24H  
• doxycycline (VIBRAMYCIN) 100 mg in 0.9% sodium chloride (MBP/ADV) 100 mL MBP  100 mg IntraVENous Q12H  
 
 
Chata Reza NP 
1/12/2021 
 
ADDENDUM:  Chad Kimball MD 
Pt seen and examined.  Pt resting in bed.  Complaining of sorethroat from NG tube.  No nausea or vomiting.  AXR showed improvement.  Continue NG tube decompression.  Labs in am.  Repeat AXR in am.

## 2021-01-12 NOTE — CONSULTS
[facility] Name:  Lauren Oneill 
MR#:  [id number] :  1935 ACCOUNT #:  [de-identified] DATE OF SERVICE:  2021 REASON FOR CONSULTATION:  Seen for renal failure. Thanks Dr. Kinza Ruffin for the consult. HISTORY OF PRESENT ILLNESS:  We had the pleasure of seeing the patient. Most of the history was taken from the chart, and also, we talked to the patient. A little bit poor historian, came to the emergency room yesterday with a creatinine of 4, and the initial complaint was that the patient has 4-5 days' history of abdominal pain and 2 days' history of constipation. Looking at the old records, the patient has chronic urethral stricture that she was supposed to be doing self-catheterization, and she missed following up with Urology as per Urology. Ended up getting a CT scan of her abdomen for the abdominal distention and had no hydronephrosis, but she had massively distended urinary bladder, and she also had some distal small-bowel obstruction. Bhatt catheter was inserted, and the patient had urine output after inserting the Bhatt, 2.4 L today. Not recorded, but she had around 700 mL of emesis. Blood pressure is acceptable, and Urology is seeing the patient. PAST MEDICAL HISTORY:  Chronic urethral stricture, CKD stage III, hypertension, small-bowel obstruction, and splenectomy. MEDICATIONS AS INPATIENT:  Include Rocephin, doxycycline, Keppra, Zosyn, and  mL/hour. ALLERGIES:  TO ASPIRIN AND TRAMADOL. SOCIAL HISTORY:  Reviewed. FAMILY HISTORY:  Reviewed. REVIEW OF SYSTEMS:  Look at the HPI, rest is negative. PHYSICAL EXAMINATION: 
GENERAL:  Lethargic. VITAL SIGNS:  Blood pressure 134/67, satting 95%. NECK:  Difficult to assess JVD. ABDOMEN:  Distended. We talked about the urine output in the HPI. LABORATORY DATA:  Labs as follows:  Sodium 135, potassium 5.2, BUN 51, creatinine 3, calcium 9.2, albumin is 2.6, and phosphorus 4.8. Anion gap is 9. There is no UA. Hemoglobin is 10.7, platelets 922. IMPRESSION: 
1. Acute kidney injury, related to urinary retention from urethral stricture, status post Bhatt catheter insertion. 2.  Chronic kidney disease III. 3.  Mild hyperkalemia. 4.  Hypertension. 5.  Small-bowel obstruction. RECOMMENDATION: 
1. Switch IV fluid to normal saline. Her potassium is a little bit elevated. 2.  Keep Bhatt catheter. Need to keep it as an outpatient and to see Urology. 3.  Check UA. 4.  Discussed with Urology and with RN. 5.  See Dr. Mona Marin. MD CLARE [email or web address] 
D:  01/11/2021 15:19 
T:  01/11/2021 19:42 JOB #:  [id number]

## 2021-01-12 NOTE — PROGRESS NOTES
Bedside shift change report given to Fallon Ignacio (oncoming nurse) by Elkin Goel  (offgoing nurse). Report included the following information SBAR.

## 2021-01-12 NOTE — PROGRESS NOTES
RENAL  PROGRESS NOTE Subjective:  
 resting Objective: VITALS SIGNS:   
Visit Vitals /63 (BP 1 Location: Left arm, BP Patient Position: At rest) Pulse 68 Temp 98.6 °F (37 °C) Resp 18 Ht 5' 6\" (1.676 m) Wt 63.5 kg (140 lb) SpO2 98% BMI 22.60 kg/m² O2 Device: Nasal cannula O2 Flow Rate (L/min): 2 l/min Temp (24hrs), Av.1 °F (37.3 °C), Min:98.6 °F (37 °C), Max:99.9 °F (37.7 °C) PHYSICAL EXAM: 
 
resting DATA REVIEW:  
 
INTAKE / OUTPUT:  
Last shift:      No intake/output data recorded. Last 3 shifts: 01/10 190 -  0700 In: 48 Out: 3900 [TEOXH:[id number]] Intake/Output Summary (Last 24 hours) at 2021 1139 Last data filed at 2021 1757 Gross per 24 hour Intake 50 ml Output 1500 ml Net -1450 ml LABS:  
Recent Labs  
  21 
0115 21 
0340 01/10/21 
1724 WBC 6.5 10.5 7.0 HGB 9.7* 10.7* 11.4* HCT 28.8* 31.5* 34.6*  272 275 Recent Labs  
  21 
0115 21 
0340 01/10/21 
1916 01/10/21 
1724  135* 132* 130*  
K 4.3 5.2* 5.5* 5.2*  
 100 99 98  
CO2 28 26 26 25 GLU 86 107* 147* 155* BUN 45* 51* 54* 52* CREA 1.69* 3.04* 3.88* 4.07* CA 8.6 9.2 9.8 10.0 MG  --  3.9*  --   --   
PHOS  --  4.8*  --   --   
ALB 2.3* 2.6*  --  3.5 TBILI 0.6 0.9  --  0.7 ALT 36 44  --  50  
 
 
A/P: 
 
CKD-3 RICH,2nd urinary retention ;collado in;she had chronic Urethral stricture SBO Mild hyperkalemia,BETTER Keep collado Labs better Can dc from RENAL standpoint Rebekah Brian MD

## 2021-01-12 NOTE — PROGRESS NOTES
[address] [facility] Adult  Hospitalist Group Hospitalist Progress Note Siva Chaney MD 
Answering service: [phone] OR [phone] from in house phone Date of Service:  2021 NAME:  Elias Garber :  1935 MRN:  [id number] Admission Summary: This is an 70-year-old woman with past medical history significant for hypertension, consumptive thrombocytopenia status post splenectomy,presented to the ED and was admitted when she was found to have SBO on work up in the ED Interval history / Subjective: f/u SBO Patient has NGT now clamped per surgery. Has not had flatus but BM yesterday. She denied abdominal pain, nausea, vomiting or sob. Assessment & Plan:  
Small bowel obstruction 
-CT A/P showed markedly distended, fluid-filled small bowel, stomach, and esophagus, compatible with distal small bowel obstruction. Massively distended urinary bladder, with no visualized mass or calculus. Patchy bibasilar airspace disease may represent pneumonia or aspiration. -NPO with ice chips -IV fluids 
-General surgery on board,recommended conservative management  With NGT clamped may tryl sips of clears later today. - KUB daily Acute-on-chronic kidney disease stage V:suspect obstructive uropathy Acute urinary retention: resolved 
-CT A/P showed massively distended urinary bladder, with no visualized mass or calculus. 
-Collado in place(1/10) -Monitor renal function daily,creatinine improving. S/o by renal 
- per Urology \" cont collado at d/c FU scheduled 21 at 10:20 AM with Dr. Wendi Biggs at the [facility] Mild Hyponatremia and Hyperkalemia due to dehydration,carin: resolved with IVF Suspected  Asp Pneumonia CT a/p that showed patchy bibasilar airspace disease may represent pneumonia or aspiration. Patient afebrile and denied respiratory symptoms -Continue Rocephin and doxycycline. -SARS COV 2 neg 
- can d/c droplet plus isolation  And transfer to non-covid floor - CXR in AM 
  
Hyperglycemia w/o history of DM. A1C 5. 3. Hyperglycemia likely due to acute illness. Monitor Hypertension:stable. Hold home meds (hctz,amlodipine,and lisinopril). use prn  Hydralazine if SBP >160 Suspected seizure disorder:continue keppra iv Code status: full code DVT prophylaxis: scd Care Plan discussed with: patient Anticipated Disposition: Home w/Family Anticipated Discharge: TBD likely 2-3 days get PT/OT [facility] Problems  Date Reviewed: 1/10/2021 Codes Class Noted POA * (Principal) Small bowel obstruction ([facility] [address] ICD-10-CM: K40.245 ICD-9-CM: 560.9  1/10/2021 Yes Review of Systems: A comprehensive review of systems was negative except for that written in the HPI. Vital Signs:  
 Last 24hrs VS reviewed since prior progress note. Most recent are: 
Visit Vitals /63 (BP 1 Location: Left arm, BP Patient Position: At rest) Pulse 68 Temp 98.6 °F (37 °C) Resp 18 Ht 5' 6\" (1.676 m) Wt 63.5 kg (140 lb) SpO2 98% BMI 22.60 kg/m² Intake/Output Summary (Last 24 hours) at 1/12/2021 1357 Last data filed at 1/12/2021 1323 Gross per 24 hour Intake 50 ml Output 1250 ml Net -1200 ml Physical Examination:  
 
I had a face to face encounter with this patient and independently examined them on1/12/2021 as outlined below: 
     
Constitutional:  No acute distress, cooperative, pleasant HEENT:  Atraumatic. Oral mucosa moist NGT Resp:  CTA bilaterally. No wheezing/rhonchi/rales. No accessory muscle use Chest Wall: No deformity CV:  Regular rhythm, normal rate, no murmurs, gallops, rubs GI:  Mildly distended, hypo active bowel sounds,non tender. :  No CVA or suprapubic tenderness Musculoskeletal:  No edema, warm,  Neurologic:  Mental status:AAOx3,  
 Motor exam:Moves all extremities symmetrically Data Review:  
 Review and/or order of clinical lab test 
Review and/or order of tests in the radiology section of [facility] Review and/or order of tests in the medicine section of [facility] Labs:  
 
Recent Labs  
  01/12/21 0115 01/11/21 0340 WBC 6.5 10.5 HGB 9.7* 10.7* HCT 28.8* 31.5*  272 Recent Labs  
  01/12/21 
0115 01/11/21 
0340 01/10/21 
1916  135* 132* K 4.3 5.2* 5.5*  
 100 99 CO2 28 26 26 BUN 45* 51* 54* CREA 1.69* 3.04* 3.88* GLU 86 107* 147* CA 8.6 9.2 9.8 MG  --  3.9*  --   
PHOS  --  4.8*  --   
 
Recent Labs  
  01/12/21 
0115 01/11/21 
0340 01/10/21 
1724 ALT 36 44 50 AP 69 84 102 TBILI 0.6 0.9 0.7 TP 6.3* 6.7 8.7* ALB 2.3* 2.6* 3.5 GLOB 4.0 4.1* 5.2*  
LPSE  --   --  153 No results for input(s): INR, PTP, APTT, INREXT, INREXT in the last 72 hours. No results for input(s): FE, TIBC, PSAT, FERR in the last 72 hours. No results found for: FOL, RBCF No results for input(s): PH, PCO2, PO2 in the last 72 hours. Recent Labs  
  01/11/21 0340 TROIQ <0.05 No results found for: CHOL, CHOLX, CHLST, CHOLV, HDL, HDLP, LDL, LDLC, DLDLP, TGLX, TRIGL, TRIGP, CHHD, CHHDX Lab Results Component Value Date/Time Glucose (POC) 120 (H) 01/11/2021 03:47 PM  
 
Lab Results Component Value Date/Time  Color YELLOW/STRAW 01/11/2021 05:29 PM  
 Appearance CLEAR 01/11/2021 05:29 PM  
 Specific gravity 1.020 01/11/2021 05:29 PM  
 pH (UA) 5.5 01/11/2021 05:29 PM  
 Protein 30 (A) 01/11/2021 05:29 PM  
 Glucose Negative 01/11/2021 05:29 PM  
 Ketone TRACE (A) 01/11/2021 05:29 PM  
 Bilirubin Negative 01/11/2021 05:29 PM  
 Urobilinogen 0.2 01/11/2021 05:29 PM  
 Nitrites Negative 01/11/2021 05:29 PM  
 Leukocyte Esterase TRACE (A) 01/11/2021 05:29 PM  
 Epithelial cells FEW 01/11/2021 05:29 PM  
 Bacteria Negative 01/11/2021 05:29 PM  
 WBC 0-4 01/11/2021 05:29 PM  
 RBC 0-5 01/11/2021 05:29 PM  
 
 
 
Medications Reviewed:  
 
Current Facility-Administered Medications Medication Dose Route Frequency  dextrose 5% and 0.9% NaCl infusion  100 mL/hr IntraVENous CONTINUOUS  
 levETIRAcetam (KEPPRA) 500 mg in 0.9% sodium chloride 100 mL IVPB  500 mg IntraVENous Q12H  
 HYDROmorphone (PF) (DILAUDID) injection 1 mg  1 mg IntraVENous Q4H PRN  
 sodium chloride (NS) flush 5-40 mL  5-40 mL IntraVENous Q8H  
 sodium chloride (NS) flush 5-40 mL  5-40 mL IntraVENous PRN  
 acetaminophen (TYLENOL) tablet 650 mg  650 mg Oral Q6H PRN Or  
 acetaminophen (TYLENOL) suppository 650 mg  650 mg Rectal Q6H PRN  promethazine (PHENERGAN) tablet 12.5 mg  12.5 mg Oral Q6H PRN Or  
 ondansetron (ZOFRAN) injection 4 mg  4 mg IntraVENous Q6H PRN  
 cefTRIAXone (ROCEPHIN) 1 g in 0.9% sodium chloride (MBP/ADV) 50 mL MBP  1 g IntraVENous Q24H  
 doxycycline (VIBRAMYCIN) 100 mg in 0.9% sodium chloride (MBP/ADV) 100 mL MBP  100 mg IntraVENous Q12H  
 
______________________________________________________________________ EXPECTED LENGTH OF STAY: 3d 4h 
ACTUAL LENGTH OF STAY:          2 Siva Pereira MD

## 2021-01-12 NOTE — PROGRESS NOTES
Problem: Mobility Impaired (Adult and Pediatric) Goal: *Acute Goals and Plan of Care (Insert Text) Description: FUNCTIONAL STATUS PRIOR TO ADMISSION: History obtained from pt and her . Patient was used a rolling walker for mobility with assist prn. He reports that pt was needed increased assist for about the last 2 months. When asked they reported 4-5 falls in the last 12 months. He reported home health coming out to the house but was unable to answer my question if it was just PT or OT as well. HOME SUPPORT PRIOR TO ADMISSION: The patient lived with her  to provide assistance. They also care for a 3year old grandson regularly Physical Therapy Goals Initiated 1/12/2021 1. Patient will move from supine to sit and sit to supine , scoot up and down, and roll side to side in bed with supervision/set-up within 7 day(s). 2.  Patient will transfer from bed to chair and chair to bed with minimal assistance/contact guard assist using the least restrictive device within 7 day(s). 3.  Patient will perform sit to stand with minimal assistance/contact guard assist within 7 day(s). 4.  Patient will ambulate with minimal assistance/contact guard assist for 50 feet with the least restrictive device within 7 day(s). 5.  Patient will ascend/descend 2 stairs with one handrail(s) with minimal assistance/contact guard assist within 7 day(s). Outcome: Progressing Towards Goal 
 PHYSICAL THERAPY EVALUATION Patient: Beatrice Whitfield (68 y.o. female) Date: 1/12/2021 Primary Diagnosis: [facility] [Z12.253] Precautions:   Fall ASSESSMENT Based on the objective data described below, the patient presents with generalized weakness, impaired sitting and standing balance, fear of falling, and a decline in functional mobility from her baseline. Unable to get her to standing with push to stand technique but was able to briefly get her to standing allowing her pull up on my arms and note significant retropulsion. I recommend having two people for next session if possible. She was admitted with an SBO and per chart review, treatment will be conservative management. Presently she has an NG tube. Anticipate slow steady gains and likely need for rehab. Nicole Le Current Level of Function Impacting Discharge (mobility/balance): mod to max with impaired sitting and standing balance and unable to achieve any amb or up to the chair today. Vitals:  
   01/12/21 1558 01/12/21 1612 BP:   (!) 148/66 (P) 138/66 BP 1 Location:   Left arm (P) Left arm BP Patient Position:   Supine (P) Sitting Pulse:   70 (P) 75 Resp:    (P) 16 Temp:    (P) 98.4 °F (36.9 °C) SpO2: on room air   94% (P) 94% Functional Outcome Measure: The patient scored 1/28 on the Tinetti outcome measure which is indicative of high fall risk. Nicole Le Other factors to consider for discharge: fall history, acute on CKD, possible PNA, now has a collado but does not have one at baseline. Patient will benefit from skilled therapy intervention to address the above noted impairments. PLAN : 
Recommendations and Planned Interventions: bed mobility training, transfer training, gait training, therapeutic exercises, patient and family training/education, and therapeutic activities Frequency/Duration: Patient will be followed by physical therapy:  5 times a week to address goals. Recommendation for discharge: (in order for the patient to meet his/her long term goals) Therapy up to 5 days/week in SNF setting unless she can make enough gains to return to home with 24/7 assistance and resumption of HHPT This discharge recommendation: A follow-up discussion with the attending provider and/or case management is planned IF patient discharges home will need the following DME: patient owns DME required for discharge SUBJECTIVE:  
Patient stated that she was afraid of falling. OBJECTIVE DATA SUMMARY:  
Consult received, chart reviewed, pt cleared by nursing. HISTORY:   
Past Medical History:  
Diagnosis Date  Hypertension  Seasonal allergic rhinitis  Spleen absent Past Surgical History:  
Procedure Laterality Date  OK ABDOMEN SURGERY PROC UNLISTED    
 spleenectomy Personal factors and/or comorbidities impacting plan of care: fall history and per her  he described what sounds like tremors. Home Situation Home Environment: Private residence # Steps to Enter: 2 Rails to Enter: Yes Hand Rails : Bilateral 
One/Two Story Residence: One story Living Alone: No 
Support Systems: Spouse/Significant Other/Partner Current DME Used/Available at Home: Walker, rolling EXAMINATION/PRESENTATION/DECISION MAKING:  
Critical Behavior: 
Neurologic State: Alert Orientation Level: Oriented X4 Cognition: Follows commands Hearing: 
  
Skin:  refer to MD and nursing notes Edema: slight edema in ankles. Range Of Motion: 
AROM: Generally decreased, functional 
  
  
  
  
  
  
  
Strength:   
Strength: Generally decreased, functional 
  
  
  
  
  
  
Tone & Sensation:  
  
  
  
  
  
Sensation: Intact Coordination: 
  
Vision:  
  
Functional Mobility: 
Bed Mobility: 
  
Supine to Sit: Moderate assistance;Bed Modified Sit to Supine: Moderate assistance Transfers: 
Sit to Stand: Maximum assistance;Assist x1 Stand to Sit: Assist x1;Maximum assistance Balance:  
Sitting: Impaired Sitting - Static: Good (unsupported) Sitting - Dynamic: Fair (occasional) Ambulation/Gait Training: 
  
  
  
  
  
  
  
  
  
  
  
  
  
  
  
  
  
  
 Stairs: Therapeutic Exercises:  
 
 
Functional Measure: 
Tinetti test: 
 
Sitting Balance: 1 Arises: 0 Attempts to Rise: 0 Immediate Standing Balance: 0 Standing Balance: 0 Nudged: 0 Eyes Closed: 0 Turn 360 Degrees - Continuous/Discontinuous: 0 Turn 360 Degrees - Steady/Unsteady: 0 Sitting Down: 0 Balance Score: 1 Balance total score Indication of Gait: 0 
R Step Length/Height: 0 
L Step Length/Height: 0 
R Foot Clearance: 0 
L Foot Clearance: 0 Step Symmetry: 0 Step Continuity: 0 Path: 0 Trunk: 0 Walking Time: 0 Gait Score: 0 Gait total score Total Score: 1/28 Overall total score Tinetti Tool Score Risk of Falls 
<19 = High Fall Risk 19-24 = Moderate Fall Risk 25-28 = Low Fall Risk Tinetti ME. Performance-Oriented Assessment of Mobility Problems in Elderly Patients. [facility] 66; [id number]. (Scoring Description: PT Bulletin Feb. 10, 1993) Older adults: Sharona Rich et al, 2009; n = [address] [facility] elderly evaluated with ABC, ADAMA, ADL, and IADL) · Mean ADAMA score for males aged 69-68 years = 26.21(3.40) · Mean ADAMA score for females age 69-68 years = 25.16(4.30) · Mean ADAMA score for males over 80 years = 23.29(6.02) · Mean ADAMA score for females over 80 years = 17.20(8.32) Physical Therapy Evaluation Charge Determination History Examination Presentation Decision-Making MEDIUM  Complexity : 1-2 comorbidities / personal factors will impact the outcome/ POC  MEDIUM Complexity : 3 Standardized tests and measures addressing body structure, function, activity limitation and / or participation in recreation  LOW Complexity : Stable, uncomplicated  LOW Complexity : FOTO score of  Based on the above components, the patient evaluation is determined to be of the following complexity level: LOW Pain Rating: None rated Activity Tolerance:  
Fair and requires rest breaks After treatment patient left in no apparent distress:  
Supine in bed, Heels elevated for pressure relief, Call bell within reach, Bed / chair alarm activated, Caregiver / family present, and Side rails x 3 
 
COMMUNICATION/EDUCATION:  
The patients plan of care was discussed with: Registered nurse. Fall prevention education was provided and the patient/caregiver indicated understanding., Patient/family have participated as able in goal setting and plan of care. , and Patient/family agree to work toward stated goals and plan of care. Thank you for this referral. 
Alicia Reyes Time Calculation: 43 mins

## 2021-01-12 NOTE — WOUND CARE
WOCN Note:  
 
New consult placed by RN for : sacrum open and buttock tear. Chart shows: 
Admitted for SBO: abdominal pain for 3-4 days, constipation for 2 days, wheel chair bound at home. 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 WBC = 6.5 On = 1-12-21 Admitted from home. Assessment:  
Patient is A&O x 3: asking if NG can come out yet, communicative, incontinent with brief, assists in repositioning and turning. Able to turn independently for assessment. Patient wearing briefs for incontinence and has a Bhatt. Bed: [place] Diet: NPO ice chips Patient reports no pain just wants NG out. Bilateral heels are pink and blanching. POA:  Left buttock ( upper ) skin tear from trauma and movement in and out of wheel chair per patient discussion. 1.1cm x 0.3cm x 0.1cm / 100% pink. Surrounding skin incontinent from stool, cleaned and new dressing applied. POA: sacrum with dark pigmentation and 2 \"kissing wounds\" left sacrum matches with right sacrum:  2 / Stage 2's / PU / POA 
1.0cm x 0.4cm x 0.3cm  : 100% pink and surrounding skin dark in color and patient was incontinent cleaned and applied foam bordered dressing that covers 2 kissing wounds and left buttock skin tear. Patient repositioned on left side with pillow between the knees. Recommendations:   
Daily: remove sacral and left buttock dressing. clean with NS, apply foam bordered dressing and secure edges with skin prep. Change PRN with stooling. Minimize layers of linen/pads under patient to optimize support surface. Turn/reposition approximately every 2 hours and offload heels. Manage incontinence / promote continence; Z-Guard Paste to buttocks and sacrum daily and as needed with incontinence care. ( apply around sacral dressing and left buttock.) Discussed above plan with patient and Kristie Oliveros RN. Transition of Care: Plan to follow weekly and as needed while admitted to hospital.  
 
Shannan ULRICH RN Wound Care Department Office: [phone] Pager: [phone]

## 2021-01-13 NOTE — PROGRESS NOTES
[address] [facility] Adult  Hospitalist Group Hospitalist Progress Note Siva Hines MD 
Answering service: [phone] Date of Service:  2021 NAME:  Beatrice Whitfield :  1935 MRN:  [id number] Admission Summary: This is an 82-year-old woman with past medical history significant for hypertension, consumptive thrombocytopenia status post splenectomy,presented to the ED and was admitted when she was found to have SBO on work up in the ED Interval history / Subjective: f/u SBO Patient  Seen and examined spoke with sister at bedside in detail. She denied abdominal pain, nausea, vomiting or sob. She has not had any flatus or bowel movement. She still has NG tube which is clamped Assessment & Plan:  
Small bowel obstruction 
-CT A/P showed markedly distended, fluid-filled small bowel, stomach, and esophagus, compatible with distal small bowel obstruction. Massively distended urinary bladder, with no visualized mass or calculus. Patchy bibasilar airspace disease may represent pneumonia or aspiration. -NPO with ice chips -IV fluids 
-General surgery on board,recommended conservative management   
- cont with NGT clamped - KUB today Acute-on-chronic kidney disease stage V:suspect obstructive uropathy Acute urinary retention: resolved 
-CT A/P showed massively distended urinary bladder, with no visualized mass or calculus. 
-Collado in place(1/10) -Monitor renal function daily,creatinine improving. S/o by renal 
- per Urology \" cont collado at d/c FU scheduled 21 at 10:20 AM with Dr. Jerry Sheppard at the [facility] Mild Hyponatremia and Hyperkalemia due to dehydration,carin: resolved with IVF Suspected  Asp Pneumonia CT a/p that showed patchy bibasilar airspace disease may represent pneumonia or aspiration. Patient afebrile and denied respiratory symptoms -  Rocephin 5 days and cont doxycycline. -SARS COV 2 neg - CXR today Hyperglycemia w/o history of DM. A1C 5.3. Hyperglycemia likely due to acute illness. Stable Hypertension:stable. Hold home meds (hctz,amlodipine,and lisinopril). use prn  Hydralazine if SBP >160 Suspected seizure disorder:continue keppra iv transition to p.o. once able to take meds Code status: full code DVT prophylaxis: scd Care Plan discussed with: patient Anticipated Disposition:  SNF per therapy Anticipated Discharge: TBD likely 2-3 days Hospital Problems  Date Reviewed: 1/10/2021 Codes Class Noted POA * (Principal) Small bowel obstruction ([facility] [address] ICD-10-CM: A14.765 ICD-9-CM: 560.9  1/10/2021 Yes Review of Systems: A comprehensive review of systems was negative except for that written in the HPI. Vital Signs:  
 Last 24hrs VS reviewed since prior progress note. Most recent are: 
Visit Vitals BP (!) 150/65 (BP 1 Location: Left arm, BP Patient Position: At rest) Pulse 70 Temp 99 °F (37.2 °C) Resp 16 Ht 5' 6\" (1.676 m) Wt 62.2 kg (137 lb 1.6 oz) SpO2 93% BMI 22.13 kg/m² Intake/Output Summary (Last 24 hours) at 1/13/2021 1004 Last data filed at 1/13/2021 4032 Gross per 24 hour Intake 100 ml Output 1325 ml Net -1225 ml Physical Examination:  
 
I had a face to face encounter with this patient and independently examined them on1/13/2021 as outlined below: 
     
Constitutional:  No acute distress, cooperative, pleasant HEENT:  Atraumatic. Oral mucosa moist NGT Resp:  CTA bilaterally. No wheezing/rhonchi/rales. No accessory muscle use Chest Wall: No deformity CV:  Regular rhythm, normal rate, no murmurs, gallops, rubs GI:  Mildly distended,  non tender. :  No CVA or suprapubic tenderness Musculoskeletal:  No edema, warm, Neurologic:  Mental status:AAOx3, Motor exam:Moves all extremities symmetrically Data Review: Review and/or order of clinical lab test 
Review and/or order of tests in the radiology section of CPT Review and/or order of tests in the medicine section of CPT Labs:  
 
Recent Labs  
  01/12/21 0115 01/11/21 0340 WBC 6.5 10.5 HGB 9.7* 10.7* HCT 28.8* 31.5*  272 Recent Labs  
  01/12/21 
0115 01/11/21 
0340 01/10/21 
1916  135* 132* K 4.3 5.2* 5.5*  
 100 99 CO2 28 26 26 BUN 45* 51* 54* CREA 1.69* 3.04* 3.88* GLU 86 107* 147* CA 8.6 9.2 9.8 MG  --  3.9*  --   
PHOS  --  4.8*  --   
 
Recent Labs  
  01/12/21 
0115 01/11/21 
0340 01/10/21 
1724 ALT 36 44 50 AP 69 84 102 TBILI 0.6 0.9 0.7 TP 6.3* 6.7 8.7* ALB 2.3* 2.6* 3.5 GLOB 4.0 4.1* 5.2*  
LPSE  --   --  153 No results for input(s): INR, PTP, APTT, INREXT, INREXT in the last 72 hours. No results for input(s): FE, TIBC, PSAT, FERR in the last 72 hours. No results found for: FOL, RBCF No results for input(s): PH, PCO2, PO2 in the last 72 hours. Recent Labs  
  01/11/21 0340 TROIQ <0.05 No results found for: CHOL, CHOLX, CHLST, CHOLV, HDL, HDLP, LDL, LDLC, DLDLP, TGLX, TRIGL, TRIGP, CHHD, CHHDX Lab Results Component Value Date/Time Glucose (POC) 120 (H) 01/11/2021 03:47 PM  
 
Lab Results Component Value Date/Time Color YELLOW/STRAW 01/11/2021 05:29 PM  
 Appearance CLEAR 01/11/2021 05:29 PM  
 Specific gravity 1.020 01/11/2021 05:29 PM  
 pH (UA) 5.5 01/11/2021 05:29 PM  
 Protein 30 (A) 01/11/2021 05:29 PM  
 Glucose Negative 01/11/2021 05:29 PM  
 Ketone TRACE (A) 01/11/2021 05:29 PM  
 Bilirubin Negative 01/11/2021 05:29 PM  
 Urobilinogen 0.2 01/11/2021 05:29 PM  
 Nitrites Negative 01/11/2021 05:29 PM  
 Leukocyte Esterase TRACE (A) 01/11/2021 05:29 PM  
 Epithelial cells FEW 01/11/2021 05:29 PM  
 Bacteria Negative 01/11/2021 05:29 PM  
 WBC 0-4 01/11/2021 05:29 PM  
 RBC 0-5 01/11/2021 05:29 PM  
 
 
 
Medications Reviewed: Current Facility-Administered Medications Medication Dose Route Frequency  hydrALAZINE (APRESOLINE) 20 mg/mL injection 10 mg  10 mg IntraVENous Q6H PRN  
 dextrose 5% and 0.9% NaCl infusion  100 mL/hr IntraVENous CONTINUOUS  
 levETIRAcetam (KEPPRA) 500 mg in 0.9% sodium chloride 100 mL IVPB  500 mg IntraVENous Q12H  
 HYDROmorphone (PF) (DILAUDID) injection 1 mg  1 mg IntraVENous Q4H PRN  
 sodium chloride (NS) flush 5-40 mL  5-40 mL IntraVENous Q8H  
 sodium chloride (NS) flush 5-40 mL  5-40 mL IntraVENous PRN  
 acetaminophen (TYLENOL) tablet 650 mg  650 mg Oral Q6H PRN Or  
 acetaminophen (TYLENOL) suppository 650 mg  650 mg Rectal Q6H PRN  promethazine (PHENERGAN) tablet 12.5 mg  12.5 mg Oral Q6H PRN Or  
 ondansetron (ZOFRAN) injection 4 mg  4 mg IntraVENous Q6H PRN  
 cefTRIAXone (ROCEPHIN) 1 g in 0.9% sodium chloride (MBP/ADV) 50 mL MBP  1 g IntraVENous Q24H  
 doxycycline (VIBRAMYCIN) 100 mg in 0.9% sodium chloride (MBP/ADV) 100 mL MBP  100 mg IntraVENous Q12H  
 
______________________________________________________________________ EXPECTED LENGTH OF STAY: 3d 4h 
ACTUAL LENGTH OF STAY:          3 Siva Ramirez MD

## 2021-01-13 NOTE — PROGRESS NOTES
Problem: Self Care Deficits Care Plan (Adult) Goal: *Therapy Goal (Edit Goal, Insert Text) Description:  
FUNCTIONAL STATUS PRIOR TO ADMISSION: Patient was modified independent using a rolling walker for functional mobility. Patient completed basic ADL tasks with Mod I 
 
HOME SUPPORT: The patient lived with spouse but did not require assist. 
 
Occupational Therapy Goals Initiated 1/13/2021 1. Patient will perform grooming with modified independence within 7 day(s). 2.  Patient will perform bathing with modified independence within 7 day(s). 3.  Patient will perform lower body dressing with modified independence within 7 day(s). 4.  Patient will perform toilet transfers with modified independence within 7 day(s). 5.  Patient will perform all aspects of toileting with modified independence within 7 day(s). 6.  Patient will participate in upper extremity therapeutic exercise/activities with supervision/set-up for 10 minutes within 7 day(s). 7.  Patient will utilize energy conservation techniques during functional activities with verbal cues within 7 day(s). Outcome: Not Met OCCUPATIONAL THERAPY EVALUATION Patient: Bennett Villa (54 y.o. female) Date: 1/13/2021 Primary Diagnosis: [facility] [K77.133] Precautions:   Fall ASSESSMENT Based on the objective data described below, the patient presents with severe decline in basic ADL performance. Today patient complete bed mobility with Min A. General weakness in UB/LB limiting ability to complete ADL tasks. Unsupported sitting balance at EOB good, though with limited activity tolerance. Patient noted with increased confusion and inattentiveness after 5+ minutes EOB. She did not report dizziness/light headedness. Unable to obtain BP reading during this time. Patient returned to bed and confusion seemed to clear. Nursing made aware. Patient reportedly had fall in past month, and experienced increasing LE weakness and difficulty standing/walking. Patient reports recently beginning home health PT services. Additional therapy services likely needed prior to DC home. Current Level of Function Impacting Discharge (ADLs/self-care): Max A bathing, dressing, toileting. Self feeding, grooming- set up Functional Outcome Measure: The patient scored Total: 30/100 on the Barthel Index outcome measure which is indicative of 70% impaired ability to care for basic self needs/dependency on others. Other factors to consider for discharge: Patient lives with spouse Patient will benefit from skilled therapy intervention to address the above noted impairments. PLAN : 
Recommendations and Planned Interventions: self care training, functional mobility training, therapeutic exercise, balance training, therapeutic activities, endurance activities, patient education, home safety training and family training/education Frequency/Duration: Patient will be followed by occupational therapy 5 times a week to address goals. Recommendation for discharge: (in order for the patient to meet his/her long term goals) Therapy up to 5 days/week in SNF setting This discharge recommendation: A follow-up discussion with the attending provider and/or case management is planned IF patient discharges home will need the following DME: TBD- discussed possible need for tub transfer bench and bed rail. SUBJECTIVE:  
Patient stated I was getting exercise at home.  OBJECTIVE DATA SUMMARY:  
HISTORY:  
Past Medical History:  
Diagnosis Date  Hypertension  Seasonal allergic rhinitis  Spleen absent Past Surgical History:  
Procedure Laterality Date  OH ABDOMEN SURGERY PROC UNLISTED    
 spleenectomy Expanded or extensive additional review of patient history:  
 
Home Situation Home Environment: Private residence # Steps to Enter: 2 Rails to Enter: Yes Hand Rails : Bilateral 
One/Two Story Residence: One story Living Alone: No 
Support Systems: Spouse/Significant Other/Partner Current DME Used/Available at Home: Grab bars Tub or Shower Type: Tub/Shower combination Hand dominance: Right EXAMINATION OF PERFORMANCE DEFICITS: 
Cognitive/Behavioral Status: 
Neurologic State: Alert Orientation Level: Oriented X4 Cognition: Follows commands Skin: intact Edema: none Hearing: 
 impaired Vision/Perceptual:   
    
    
    
  
    
    
Corrective Lenses: Reading glasses Range of Motion: 
 
AROM: Generally decreased, functional 
  
  
  
  
  
  
  
 
Strength: 
 
Strength: Generally decreased, functional 
  
  
  
  
 
Coordination: 
  
Fine Motor Skills-Upper: Left Intact; Right Intact Gross Motor Skills-Upper: Left Intact; Right Intact Tone & Sensation: 
 
  
Sensation: Intact Balance: 
Sitting: Intact; Without support Sitting - Static: Good (unsupported) Sitting - Dynamic: Good (unsupported) Functional Mobility and Transfers for ADLs: 
Bed Mobility: 
  
 
Transfers: ADL Assessment: 
Feeding: Independent Oral Facial Hygiene/Grooming: Setup Bathing: Maximum assistance Upper Body Dressing: Moderate assistance Lower Body Dressing: Maximum assistance Toileting: Maximum assistance ADL Intervention and task modifications: 
  
 
 Patient ans sister educated on DME for increased safety and mobiltyi at home including tub transfer bench and bed rail. Educated on having home health therapist assist with DME procurement in case of environmental constraints Therapeutic Exercise: 
Patient educated on simple AROM exercises to complete at bed level to gradually improve strength and activity tolerance Functional Measure: 
Barthel Index: 
 
Bathin Bladder: 0 Bowels: 5 Groomin Dressin Feeding: 10 
 Mobility: 5 Stairs: 0 Toilet Use: 0 Transfer (Bed to Chair and Back): 5 Total: 30/100 The Barthel ADL Index: Guidelines 1. The index should be used as a record of what a patient does, not as a record of what a patient could do. 2. The main aim is to establish degree of independence from any help, physical or verbal, however minor and for whatever reason. 3. The need for supervision renders the patient not independent. 4. A patient's performance should be established using the best available evidence. Asking the patient, friends/relatives and nurses are the usual sources, but direct observation and common sense are also important. However direct testing is not needed. 5. Usually the patient's performance over the preceding 24-48 hours is important, but occasionally longer periods will be relevant. 6. Middle categories imply that the patient supplies over 50 per cent of the effort. 7. Use of aids to be independent is allowed. Austin Govea, Barthel, D.W. (3145). Functional evaluation: the Barthel Index. [address] [facility] (14)2. SANDIE Zhang SeF, Violeta Goodman., Jessica Gavin., [facility], [address] [place] [address] (1999). Measuring the change indisability after inpatient rehabilitation; comparison of the responsiveness of the Barthel Index and Functional [place] Measure. Journal of Neurology, Neurosurgery, and Psychiatry, 66(4), 003-534. ALEXUS Sy.MARK, NATI Oshea, & Laine Gómez MMacyA. (2004.) Assessment of post-stroke quality of life in cost-effectiveness studies: The usefulness of the Barthel Index and the EuroQoL-5D. [facility], 13, 821-73 Occupational Therapy Evaluation Charge Determination History Examination Decision-Making LOW Complexity : Brief history review  LOW Complexity : 1-3 performance deficits relating to physical, cognitive , or psychosocial skils that result in activity limitations and / or participation restrictions  LOW Complexity : No comorbidities that affect functional and no verbal or physical assistance needed to complete eval tasks   
  
Based on the above components, the patient evaluation is determined to be of the following complexity level: LOW  
Pain Rating: 
No pain reported 
 
Activity Tolerance:  
Poor 
 
After treatment patient left in no apparent distress:   
Supine in bed, Call bell within reach, Bed / chair alarm activated, Caregiver / family present and Side rails x 3 
 
COMMUNICATION/EDUCATION:  
The patient’s plan of care was discussed with: Registered nurse.  
 
Patient/family have participated as able in goal setting and plan of care. 
 
This patient’s plan of care is appropriate for delegation to \[facility] [place] [facility]\"". 
 
Thank you for this referral. 
Geraldine Chavez OT 
Time Calculation: 34 mins

## 2021-01-13 NOTE — PROGRESS NOTES
Bedside and Verbal shift change report given to Сергей Stevens (oncoming nurse) by Danielle Sanchez (offgoing nurse). Report included the following information SBAR, Kardex and MAR.

## 2021-01-13 NOTE — PROGRESS NOTES
General Surgery Daily Progress Note Admit Date: 1/10/2021 Post-Operative Day: * No surgery found * from * No surgery found * Subjective:  
 
Last 24 hrs: Pt pulled ng out as per nursing staff; pt denies abd pain; n/v; KUB shows improvement in sbo Objective:  
 
Blood pressure (!) 150/65, pulse 70, temperature 99 °F (37.2 °C), resp. rate 16, height 5' 6\" (1.676 m), weight 62.2 kg (137 lb 1.6 oz), SpO2 93 %. Temp (24hrs), Av.7 °F (37.1 °C), Min:98.3 °F (36.8 °C), Max:99 °F (37.2 °C) 
 
 
_____________________ Physical Exam:    
Alert, in no acute distress. Cardiovascular: RRR, no peripheral edema Abdomen: soft, NT, +BS Assessment:  
Principal Problem: 
  Small bowel obstruction ([address] (1/10/2021) Plan:  
 
May leave ng out May start clear liquids and observe 
following Data Review: 
 
Recent Labs  
  21 
01121 
0340 01/10/21 
1724 WBC 6.5 10.5 7.0 HGB 9.7* 10.7* 11.4* HCT 28.8* 31.5* 34.6*  272 275 Recent Labs  
  21 
01121 
0340 01/10/21 
1916 01/10/21 
1724  135* 132* 130*  
K 4.3 5.2* 5.5* 5.2*  
 100 99 98  
CO2 28 26 26 25 GLU 86 107* 147* 155* BUN 45* 51* 54* 52* CREA 1.69* 3.04* 3.88* 4.07* CA 8.6 9.2 9.8 10.0 MG  --  3.9*  --   --   
PHOS  --  4.8*  --   --   
ALB 2.3* 2.6*  --  3.5 ALT 36 44  --  50 Recent Labs  
  01/10/21 
1724 LPSE 153  
 
 
 
 
______________________ Medications: 
 
Current Facility-Administered Medications Medication Dose Route Frequency  hydrALAZINE (APRESOLINE) 20 mg/mL injection 10 mg  10 mg IntraVENous Q6H PRN  
 dextrose 5% and 0.9% NaCl infusion  100 mL/hr IntraVENous CONTINUOUS  
 levETIRAcetam (KEPPRA) 500 mg in 0.9% sodium chloride 100 mL IVPB  500 mg IntraVENous Q12H  
 HYDROmorphone (PF) (DILAUDID) injection 1 mg  1 mg IntraVENous Q4H PRN  
 sodium chloride (NS) flush 5-40 mL  5-40 mL IntraVENous Q8H  
 • sodium chloride (NS) flush 5-40 mL  5-40 mL IntraVENous PRN  
• acetaminophen (TYLENOL) tablet 650 mg  650 mg Oral Q6H PRN  
 Or  
• acetaminophen (TYLENOL) suppository 650 mg  650 mg Rectal Q6H PRN  
• promethazine (PHENERGAN) tablet 12.5 mg  12.5 mg Oral Q6H PRN  
 Or  
• ondansetron (ZOFRAN) injection 4 mg  4 mg IntraVENous Q6H PRN  
• cefTRIAXone (ROCEPHIN) 1 g in 0.9% sodium chloride (MBP/ADV) 50 mL MBP  1 g IntraVENous Q24H  
• doxycycline (VIBRAMYCIN) 100 mg in 0.9% sodium chloride (MBP/ADV) 100 mL MBP  100 mg IntraVENous Q12H  
 
 
Chata Rzea NP 
1/13/2021 
 
 
ADDENDUM:  Chad Kimball MD 
Pt seen and examined.  Pt removed her NG tube but is currently without nausea or vomiting.  AXR showed improvement in bowel obstruction.  Recommend keeping NG tube off and trial of clears. No surgical intervention is required as small bowel obstruction is resolving.

## 2021-01-13 NOTE — PROGRESS NOTES
RENAL  PROGRESS NOTE Subjective:  
 resting Objective: VITALS SIGNS:   
Visit Vitals /65 (BP 1 Location: Left arm, BP Patient Position: At rest) Pulse 71 Temp 98.9 °F (37.2 °C) Resp 16 Ht 5' 6\" (1.676 m) Wt 62.2 kg (137 lb 1.6 oz) SpO2 94% BMI 22.13 kg/m² O2 Device: Room air O2 Flow Rate (L/min): 2 l/min Temp (24hrs), Av.6 °F (37 °C), Min:98.3 °F (36.8 °C), Max:98.9 °F (37.2 °C) PHYSICAL EXAM: 
 
resting DATA REVIEW:  
 
INTAKE / OUTPUT:  
Last shift:      No intake/output data recorded. Last 3 shifts: 1901 -  0700 In: 100 [P.O.:100] Out: 1325 [[id number]] Intake/Output Summary (Last 24 hours) at 2021 5945 Last data filed at 2021 6117 Gross per 24 hour Intake 100 ml Output 1325 ml Net -1225 ml LABS:  
Recent Labs  
  21 
0340 01/10/21 
1724 WBC 6.5 10.5 7.0 HGB 9.7* 10.7* 11.4* HCT 28.8* 31.5* 34.6*  272 275 Recent Labs  
  21 
0340 01/10/21 
1916 01/10/21 
1724  135* 132* 130*  
K 4.3 5.2* 5.5* 5.2*  
 100 99 98  
CO2 28 26 26 25 GLU 86 107* 147* 155* BUN 45* 51* 54* 52* CREA 1.69* 3.04* 3.88* 4.07* CA 8.6 9.2 9.8 10.0 MG  --  3.9*  --   --   
PHOS  --  4.8*  --   --   
ALB 2.3* 2.6*  --  3.5 TBILI 0.6 0.9  --  0.7 ALT 36 44  --  50  
 
 
A/P: 
 
CKD-3 RICH,2nd urinary retention ;collado in;she had chronic Urethral stricture SBO Mild hyperkalemia,BETTER Keep collado Labs better as of yesterday Seeing urology Can dc from RENAL standpoint Giuliana Kaur MD

## 2021-01-13 NOTE — PROGRESS NOTES
NUTRITION Reason for Rescreen: NPO/Clear Liquids RECOMMENDATIONS:  
1. Diet advancement per surgery 2. Encourage use of supplements Interventions  
- added supplements/snacks: Ensure Clear TID Information obtained from:   Patient visited yesterday Past Medical History:  
Diagnosis Date  Hypertension  Seasonal allergic rhinitis  Spleen absent Pt admitted for SBO. Chronic ureteral stricture noted with urology following. RICH improving with potassium declining. KUB today showing improvement with SBO with NGT removed, no indicated for surgery at this time. Pt visited yesterday. Appears well nourished with no wt loss noted. Pt with NGT in place at time that was bothering her throat so interview limited but does report eating well prior to acute issues. Diet advanced to clear liquids this afternoon. Will add Ensure Clear TID (720kcal, 24g protein) to maximize intake while just on clears. Consider Ensure Enlive once on full liquids Diet:  clear liquids Supplements: None Meal intake: No data found. Weight Changes:  
Stable Wt Readings from Last 10 Encounters:  
01/13/21 62.2 kg (137 lb 1.6 oz) 08/21/20 60.1 kg (132 lb 9.6 oz) 08/21/19 65.8 kg (145 lb) 04/17/19 65.8 kg (145 lb)  
07/12/18 68 kg (150 lb) 04/17/18 64.9 kg (143 lb) 10/12/17 67.7 kg (149 lb 3.2 oz) 08/08/17 68.2 kg (150 lb 6.4 oz) 06/02/17 67 kg (147 lb 9.6 oz)  
06/27/16 68.5 kg (151 lb) Nutrition-Related Concerns Identified: 
None Nutrition Diagnosis:  
· Inadequate oral intake(improving) related to altered GI function as evidenced by GI abnormality(SBO; diet advancing) Estimated Nutrition Needs:  
Energy: 1312-1420kcal(MSJ x 1.2-1.3)  Wt used: Current(62kg) Protein: 68(1.1g/kg)  Wt used: Current(62kg) Fluid: 1550 PLAN:  
- Check for acceptance of supplements - Follow for diet advancement/tolerance Will revisit per policy [facility], [address]  [address] [place] , Pager #[phone] or via [employer]

## 2021-01-13 NOTE — PROGRESS NOTES
ROSALINA: 1. RUR- 9% 2. Patient is from home and her family plans for her to return home with their assistance and home health PT/OT. 3. General surgery following- NG tube out, they will monitor her diet. Care Management Interventions PCP Verified by CM: Yes 
Palliative Care Criteria Met (RRAT>21 & CHF Dx)?: No 
Mode of Transport at Discharge: Other (see comment)(Family will transport) Transition of Care Consult (CM Consult): Discharge Planning MyChart Signup: No 
Discharge Durable Medical Equipment: No 
Health Maintenance Reviewed: Yes Physical Therapy Consult: Yes Occupational Therapy Consult: Yes Speech Therapy Consult: Yes Current Support Network: Lives with Spouse, Family Lives [place] Confirm Follow Up Transport: Family The Plan for Transition of Care is Related to the Following Treatment Goals : Return home with family assistance The Patient and/or Patient Representative was Provided with a Choice of Provider and Agrees with the Discharge Plan?: Yes Discharge Location Discharge Placement: Unable to determine at this time Reason for Admission:   Small bowel obstruction RUR Score:          9% Plan for utilizing home health:      Patient  as unsure of agency name, he explained that his son has been handling a lot of the needs. PCP: First and Last name:  Dr. Morgan Moore Name of Practice:  
 Are you a current patient: Yes/No:  
 Approximate date of last visit:  
 Can you participate in a virtual visit with your PCP: unknown Current Advanced Directive/Advance Care Plan: Not on file. Transition of Care Plan: CM noted patient has some confusion and  To contact her ,[facility] [phone]. CM spoke with her  who confirmed demographics, insurance, and PCP information. He said she has had one visit from PT at home, and his son is arranging for other care but unsure of agency names and what the care is for. CM offered to contact the son, but her  stated he would attempt to call him. Prior to admission, this patient was receiving assistance from her  with some of her ADLs. She was also using a walker for ambulation prior to admission. CM will continue to follow for discharge needs.  
 
[address], [facility]

## 2021-01-13 NOTE — PROGRESS NOTES
Problem: Pressure Injury - Risk of 
Goal: *Prevention of pressure injury Description: Document Brendon Scale and appropriate interventions in the flowsheet. Outcome: Progressing Towards Goal 
Note: Pressure Injury Interventions: Activity Interventions: Pressure redistribution bed/mattress(bed type), Increase time out of bed Mobility Interventions: Pressure redistribution bed/mattress (bed type) Nutrition Interventions: Document food/fluid/supplement intake Friction and Shear Interventions: Apply protective barrier, creams and emollients Problem: Patient Education: Go to Patient Education Activity Goal: Patient/Family Education Outcome: Progressing Towards Goal

## 2021-01-14 NOTE — PROGRESS NOTES
[address] [facility] Adult  Hospitalist Group Hospitalist Progress Note Siva Garcia MD 
Answering service: [phone] Date of Service:  2021 NAME:  Zahida Wallace :  1935 MRN:  [id number] Admission Summary: This is an 81-year-old woman with past medical history significant for hypertension, consumptive thrombocytopenia status post splenectomy,presented to the ED and was admitted when she was found to have SBO on work up in the ED Interval history / Subjective: f/u SBO Patient  Seen and examined spoke with daughter at bedside in detail she wants her to go to rehab. patient denied abdominal pain, nausea, vomiting or sob. She is off  NG tube and started on CLD today. Assessment & Plan:  
Small bowel obstruction 
-CT A/P showed markedly distended, fluid-filled small bowel, stomach, and esophagus, compatible with distal small bowel obstruction. Massively distended urinary bladder, with no visualized mass or calculus. Patchy bibasilar airspace disease may represent pneumonia or aspiration. -CLD advance as tolerated to FLD today -IV fluids taper off 
-GS  recommended conservative management - s/p NGT    
- KUB yesterday improving repeat in AM 
 
 RICH from  obstructive uropathy  Resolved CKD stg 2 Acute urinary retention: resolved  
-CT A/P showed massively distended urinary bladder, with no visualized mass or calculus. 
-Collado in place(1/10) cont for now 
- creatinine normal now  S/o by renal 
- per Urology \" cont collado at d/c FU scheduled 21 at 10:20 AM with Dr. Swapna Prater at the [facility] Mild Hyponatremia/Hyperkalemia due to dehydration/rich: resolved with IVF, stop IVF Suspected  Asp Pneumonia CT a/p that showed patchy bibasilar airspace disease may represent pneumonia or aspiration. Patient afebrile and denied respiratory symptoms - Rocephin 5 days and cont doxycycline. -SARS COV 2 neg - CXR noted basilar opacities, check BNP in AM 
  
Hyperglycemia w/o history of DM. A1C 5.3. Hyperglycemia likely due to acute illness. Stable Hypertension BP elevated now Hold  Hctz, and start back home meds amlodipine/lisinopril. Prn  Hydralazine if SBP >160 Suspected seizure disorder:continue keppra  P.o., seizure precautions Code status: full code DVT prophylaxis: SCD Care Plan discussed with: patient Anticipated Disposition:  SNF per therapy Anticipated Discharge: TBD likely 1-2 days Hospital Problems  Date Reviewed: 1/10/2021 Codes Class Noted POA * (Principal) Small bowel obstruction ([facility] [address] ICD-10-CM: P74.733 ICD-9-CM: 560.9  1/10/2021 Yes Review of Systems: A comprehensive review of systems was negative except for that written in the HPI. Vital Signs:  
 Last 24hrs VS reviewed since prior progress note. Most recent are: 
Visit Vitals BP (!) 156/56 (BP 1 Location: Right arm, BP Patient Position: At rest) Pulse 69 Temp 98.8 °F (37.1 °C) Resp 18 Ht 5' 6\" (1.676 m) Wt 62.2 kg (137 lb 1.6 oz) SpO2 94% BMI 22.13 kg/m² No intake or output data in the 24 hours ending 01/14/21 1011 Physical Examination:  
 
I had a face to face encounter with this patient and independently examined them on1/14/2021 as outlined below: 
     
Constitutional:  No acute distress, cooperative, pleasant HEENT:  Atraumatic. Oral mucosa moist    
Resp:  CTA bilaterally. No wheezing/rhonchi/rales. No accessory muscle use Chest Wall: No deformity CV:  Regular rhythm, normal rate, no murmurs, gallops, rubs GI:  Mildly distended,  non tender. :  No CVA or suprapubic tenderness Musculoskeletal:  No edema, warm, Neurologic:  Mental status:AAOx3, Motor exam:Moves all extremities symmetrically Data Review:  
 Review and/or order of clinical lab test 
 Review and/or order of tests in the radiology section of CPT Review and/or order of tests in the medicine section of CPT Labs:  
 
Recent Labs  
  01/14/21 
0752 01/12/21 
0115 WBC 6.4 6.5 HGB 9.5* 9.7* HCT 29.4* 28.8*  
 291 Recent Labs  
  01/14/21 
0752 01/12/21 0115  137  
K 3.7 4.3 * 108 CO2 26 28 BUN 12 45* CREA 0.79 1.69* GLU 99 86  
CA 8.5 8.6 MG 1.9  --   
 
Recent Labs  
  01/12/21 0115 ALT 36 AP 69 TBILI 0.6 TP 6.3* ALB 2.3*  
GLOB 4.0 No results for input(s): INR, PTP, APTT, INREXT, INREXT in the last 72 hours. No results for input(s): FE, TIBC, PSAT, FERR in the last 72 hours. No results found for: FOL, RBCF No results for input(s): PH, PCO2, PO2 in the last 72 hours. No results for input(s): CPK, CKNDX, TROIQ in the last 72 hours. No lab exists for component: CPKMB No results found for: CHOL, CHOLX, CHLST, CHOLV, HDL, HDLP, LDL, LDLC, DLDLP, TGLX, TRIGL, TRIGP, CHHD, CHHDX Lab Results Component Value Date/Time Glucose (POC) 120 (H) 01/11/2021 03:47 PM  
 
Lab Results Component Value Date/Time Color YELLOW/STRAW 01/11/2021 05:29 PM  
 Appearance CLEAR 01/11/2021 05:29 PM  
 Specific gravity 1.020 01/11/2021 05:29 PM  
 pH (UA) 5.5 01/11/2021 05:29 PM  
 Protein 30 (A) 01/11/2021 05:29 PM  
 Glucose Negative 01/11/2021 05:29 PM  
 Ketone TRACE (A) 01/11/2021 05:29 PM  
 Bilirubin Negative 01/11/2021 05:29 PM  
 Urobilinogen 0.2 01/11/2021 05:29 PM  
 Nitrites Negative 01/11/2021 05:29 PM  
 Leukocyte Esterase TRACE (A) 01/11/2021 05:29 PM  
 Epithelial cells FEW 01/11/2021 05:29 PM  
 Bacteria Negative 01/11/2021 05:29 PM  
 WBC 0-4 01/11/2021 05:29 PM  
 RBC 0-5 01/11/2021 05:29 PM  
 
 
 
Medications Reviewed:  
 
Current Facility-Administered Medications Medication Dose Route Frequency  hydrALAZINE (APRESOLINE) 20 mg/mL injection 10 mg  10 mg IntraVENous Q6H PRN  
  dextrose 5% and 0.9% NaCl infusion  50 mL/hr IntraVENous CONTINUOUS  
 levETIRAcetam (KEPPRA) 500 mg in 0.9% sodium chloride 100 mL IVPB  500 mg IntraVENous Q12H  
 HYDROmorphone (PF) (DILAUDID) injection 1 mg  1 mg IntraVENous Q4H PRN  
 sodium chloride (NS) flush 5-40 mL  5-40 mL IntraVENous Q8H  
 sodium chloride (NS) flush 5-40 mL  5-40 mL IntraVENous PRN  
 acetaminophen (TYLENOL) tablet 650 mg  650 mg Oral Q6H PRN Or  
 acetaminophen (TYLENOL) suppository 650 mg  650 mg Rectal Q6H PRN  promethazine (PHENERGAN) tablet 12.5 mg  12.5 mg Oral Q6H PRN Or  
 ondansetron (ZOFRAN) injection 4 mg  4 mg IntraVENous Q6H PRN  
 cefTRIAXone (ROCEPHIN) 1 g in 0.9% sodium chloride (MBP/ADV) 50 mL MBP  1 g IntraVENous Q24H  
 doxycycline (VIBRAMYCIN) 100 mg in 0.9% sodium chloride (MBP/ADV) 100 mL MBP  100 mg IntraVENous Q12H  
 
______________________________________________________________________ EXPECTED LENGTH OF STAY: 3d 4h 
ACTUAL LENGTH OF STAY:          4 Siva Cornejo MD

## 2021-01-14 NOTE — PROGRESS NOTES
RENAL  PROGRESS NOTE Subjective:  
Doing ok,minimally verbal 
Objective: VITALS SIGNS:   
Visit Vitals BP (!) 156/56 (BP 1 Location: Right arm, BP Patient Position: At rest) Pulse 69 Temp 98.8 °F (37.1 °C) Resp 18 Ht 5' 6\" (1.676 m) Wt 62.2 kg (137 lb 1.6 oz) SpO2 94% BMI 22.13 kg/m² O2 Device: Room air O2 Flow Rate (L/min): 2 l/min Temp (24hrs), Av °F (37.2 °C), Min:98.7 °F (37.1 °C), Max:99.6 °F (37.6 °C) PHYSICAL EXAM: 
NAD 
DATA REVIEW:  
 
INTAKE / OUTPUT:  
Last shift:      No intake/output data recorded. Last 3 shifts:  1901 -  0700 In: -  
Out: 825 [Urine:825] Intake/Output Summary (Last 24 hours) at 2021 2222 Last data filed at 2021 1000 Gross per 24 hour Intake  Output 300 ml Net -300 ml LABS:  
Recent Labs  
  21 
0752 21 
0115 WBC 6.4 6.5 HGB 9.5* 9.7* HCT 29.4* 28.8*  
 291 Recent Labs  
  21 
0752 21 
0115  137  
K 3.7 4.3 * 108 CO2 26 28 GLU 99 86 BUN 12 45* CREA 0.79 1.69* CA 8.5 8.6 MG 1.9  --   
ALB  --  2.3* TBILI  --  0.6 ALT  --  36  
 
 
A/P: 
 
CKD-3 RICH,2nd urinary retention ;collado in;she had chronic Urethral stricture SBO Mild hyperkalemia,BETTER Keep collado Labs better Seeing urology Can dc from RENAL standpoint Daniele Silverio MD

## 2021-01-14 NOTE — PROGRESS NOTES
TRANSITION OF CARE 
RUR--8% Disposition--To inpatient rehab. Provider TBD. Referred to [facility], and response is pending. If IR is declined, eduardo Jacobs has SNF provider list. 
Transport--TBD. Probable BLS stretcher with [facility] Primary family contact is eduardo Acevedo cell [phone]. CM noted that therapy recommends rehab. CM met with patient and daughter Randell Fontaine at the bedside. CM gave Elvira the SNF rehab provider list. Patient has 4 supportive adult children who live locally. Per Randell Fontaine, primary family contact is eduardo Jacobs. Randell Cameronjoss will convey the SNF rehab provider list to eduardo Jacobs. CM spoke with the Physical Therapist who clarified that recommendation is for inpatient rehab (and SNF if IR is declined. ) CM spoke with Eduardo Jacobs and offered choice of inpatient rehab providers. Son selected [facility]. CM placed Freedom of Choice form on the chart. CM sent referral via [employer] to [facility], and the response is pending. Eduardo Hunt has the SNF rehab provider list, if needed.

## 2021-01-14 NOTE — PROGRESS NOTES
Problem: Self Care Deficits Care Plan (Adult) Goal: *Therapy Goal (Edit Goal, Insert Text) Description:  
FUNCTIONAL STATUS PRIOR TO ADMISSION: Patient was modified independent using a rolling walker for functional mobility. Patient completed basic ADL tasks with Mod I; family reports patient mostly sat on couch PTA, \"stopped going to table for meals, helping with no IADLs etc. etc. Just stopped doing everything. \" HOME SUPPORT: The patient lived with spouse but did not require assist. 
 
Occupational Therapy Goals Initiated 1/13/2021 1. Patient will perform grooming with modified independence within 7 day(s). 2.  Patient will perform bathing with modified independence within 7 day(s). 3.  Patient will perform lower body dressing with modified independence within 7 day(s). 4.  Patient will perform toilet transfers with modified independence within 7 day(s). 5.  Patient will perform all aspects of toileting with modified independence within 7 day(s). 6.  Patient will participate in upper extremity therapeutic exercise/activities with supervision/set-up for 10 minutes within 7 day(s). 7.  Patient will utilize energy conservation techniques during functional activities with verbal cues within 7 day(s). Outcome: Progressing Towards Goal 
 OCCUPATIONAL THERAPY TREATMENT Patient: Benito Otto (89 y.o. female) Date: 1/14/2021 Diagnosis: Small bowel obstruction ([address] [K56.609] Small bowel obstruction ([address] Precautions: Fall Chart, occupational therapy assessment, plan of care, and goals were reviewed. ASSESSMENT Patient continues with skilled OT services and is progressing towards goals. Patient willing to participate with therapy with encouragement; UE bathing, bed mobility and functional sitting tolerance with bed in chair position tolerated with ongoing encouragement; able to doff bra with mod A. Limited by functional strength and endurance and decreased sitting tolerance. Able to request extra blanket, grooming supplies etc. 
 
Current Level of Function Impacting Discharge (ADLs): set up/mod A UE ADLs, limited by strength and endurance; Mod A bed mobility Other factors to consider for discharge: supportive family PLAN : 
Patient continues to benefit from skilled intervention to address the above impairments. Continue treatment per established plan of care. to address goals. Recommend with staff: full upright for meals with full set up; family c/o patient inability to open containers Recommend next OT session: up to chair for grooming; handout for UE AROM HEP Recommendation for discharge: (in order for the patient to meet his/her long term goals) Therapy 3 hours per day 5-7 days per week vs HH depending on progress in acute care This discharge recommendation: A follow-up discussion with the attending provider and/or case management is planned IF patient discharges home will need the following DME: bedside commode, gait belt, transfer bench, and wheelchair SUBJECTIVE:  
Patient stated I want to take this bra off.  OBJECTIVE DATA SUMMARY:  
Cognitive/Behavioral Status: 
Neurologic State: Alert Functional Mobility and Transfers for ADLs: 
Bed Mobility: Mod A 
 
 
 
ADL Intervention: Training patient in need to increase activity level, at home and now here in hospital; she is able to verbalize understanding of training and was agreeable to increased activity but she did need encouragement; supportive family present; encouraged ongoing ADL/IADL participation throughout the day. Therapeutic Exercises:  
Family c/o patient not strong enough to open containers; trained patient to don/doff pillow case on each set of commercials while awake as tool for strengthening exercises Pain: No pain reported this session; reports fatigue and desire to lay back down from bed in chair position post 10 minutes; agreeable to stay in modified chair position with encouragement Activity Tolerance:  
Fair, Poor, SpO2 stable on RA, and requires frequent rest breaks After treatment patient left in no apparent distress:  
Call bell within reach, Caregiver / family present, Side rails x 3, and bed in modified chair position with slight recline at patient request 
 
COMMUNICATION/COLLABORATION:  
The patients plan of care was discussed with: Physical therapist and Registered nurse. Lucille De La Torre OTR/L Time Calculation: 40 mins

## 2021-01-14 NOTE — PROGRESS NOTES
Progress Note Patient: Abdulaziz Acosta MRN: [id number]  SSN: [id number] YOB: 1935  Age: 82 y.o. Sex: female Admit Date: 1/10/2021 Small bowel obstruction Subjective: No acute surgical issues. Pt is doing well. Tolerating liquid diet. No nausea or vomiting. Pain is under control. Patient reported she has had a bowel movement. Renal function has normalized. Objective:  
 
Visit Vitals /69 Pulse 72 Temp 98.8 °F (37.1 °C) Resp 18 Ht 5' 6\" (1.676 m) Wt 137 lb 1.6 oz (62.2 kg) SpO2 94% BMI 22.13 kg/m² Temp (24hrs), Av °F (37.2 °C), Min:98.7 °F (37.1 °C), Max:99.6 °F (37.6 °C) Physical Exam:   
Gen:  NAD Pulm:  Unlabored Abd:  S/ND/non-TTP Recent Results (from the past 24 hour(s)) METABOLIC PANEL, BASIC Collection Time: 21  7:52 AM  
Result Value Ref Range Sodium 142 136 - 145 mmol/L Potassium 3.7 3.5 - 5.1 mmol/L Chloride 112 (H) 97 - 108 mmol/L  
 CO2 26 21 - 32 mmol/L Anion gap 4 (L) 5 - 15 mmol/L Glucose 99 65 - 100 mg/dL BUN 12 6 - 20 MG/DL Creatinine 0.79 0.55 - 1.02 MG/DL  
 BUN/Creatinine ratio 15 12 - 20 GFR est AA >60 >60 ml/min/1.73m2 GFR est non-AA >60 >60 ml/min/1.73m2 Calcium 8.5 8.5 - 10.1 MG/DL MAGNESIUM Collection Time: 21  7:52 AM  
Result Value Ref Range Magnesium 1.9 1.6 - 2.4 mg/dL CBC WITH AUTOMATED DIFF Collection Time: 21  7:52 AM  
Result Value Ref Range WBC 6.4 3.6 - 11.0 K/uL  
 RBC 3.22 (L) 3.80 - 5.20 M/uL HGB 9.5 (L) 11.5 - 16.0 g/dL HCT 29.4 (L) 35.0 - 47.0 % MCV 91.3 80.0 - 99.0 FL  
 MCH 29.5 26.0 - 34.0 PG  
 MCHC 32.3 30.0 - 36.5 g/dL  
 RDW 15.9 (H) 11.5 - 14.5 % PLATELET 607 820 - 409 K/uL MPV 10.0 8.9 - 12.9 FL  
 NRBC 0.0 0  WBC ABSOLUTE NRBC 0.00 0.00 - 0.01 K/uL NEUTROPHILS 71 32 - 75 % LYMPHOCYTES 13 12 - 49 % MONOCYTES 11 5 - 13 % EOSINOPHILS 3 0 - 7 % BASOPHILS 1 0 - 1 % IMMATURE GRANULOCYTES 1 (H) 0.0 - 0.5 % ABS. NEUTROPHILS 4.6 1.8 - 8.0 K/UL  
 ABS. LYMPHOCYTES 0.8 0.8 - 3.5 K/UL  
 ABS. MONOCYTES 0.7 0.0 - 1.0 K/UL  
 ABS. EOSINOPHILS 0.2 0.0 - 0.4 K/UL  
 ABS. BASOPHILS 0.0 0.0 - 0.1 K/UL  
 ABS. IMM. GRANS. 0.1 (H) 0.00 - 0.04 K/UL  
 DF AUTOMATED    
NT-PRO BNP Collection Time: 01/14/21  7:52 AM  
Result Value Ref Range NT pro- <450 PG/ML Assessment:  
 
Hospital Problems  Date Reviewed: 1/10/2021 Codes Class Noted POA * (Principal) Small bowel obstruction ([facility] [address] ICD-10-CM: O64.175 ICD-9-CM: 560.9  1/10/2021 Yes Plan/Recommendations/Medical Decision Making:  
 
- Small bowel obstruction:  Resolved. - Advance diet as tolerated - Pain control 
- PT/OT 
- Consider DC collado in am 
- May be discharged tomorrow if able to tolerate diet  From Gen Surgery standpoint

## 2021-01-14 NOTE — PROGRESS NOTES
Problem: Mobility Impaired (Adult and Pediatric) Goal: *Acute Goals and Plan of Care (Insert Text) Description: FUNCTIONAL STATUS PRIOR TO ADMISSION: History obtained from pt and her . Patient was used a rolling walker for mobility with assist prn. He reports that pt was needed increased assist for about the last 2 months. When asked they reported 4-5 falls in the last 12 months. He reported home health coming out to the house but was unable to answer my question if it was just PT or OT as well. HOME SUPPORT PRIOR TO ADMISSION: The patient lived alone with her  to provide assistance. They also care for a 3year old grandson regularly Physical Therapy Goals Initiated 1/12/2021 1. Patient will move from supine to sit and sit to supine , scoot up and down, and roll side to side in bed with supervision/set-up within 7 day(s). 2.  Patient will transfer from bed to chair and chair to bed with minimal assistance/contact guard assist using the least restrictive device within 7 day(s). 3.  Patient will perform sit to stand with minimal assistance/contact guard assist within 7 day(s). 4.  Patient will ambulate with minimal assistance/contact guard assist for 50 feet with the least restrictive device within 7 day(s). 5.  Patient will ascend/descend 2 stairs with one handrail(s) with minimal assistance/contact guard assist within 7 day(s). Outcome: Slowly Progressing Towards Goal 
 
PHYSICAL THERAPY TREATMENT Patient: Roseann Verduzco (59 y.o. female) Date: 1/14/2021 Diagnosis: Small bowel obstruction ([facility] [address] [K56.609] Small bowel obstruction ([facility] [address] Precautions: Fall Chart, physical therapy assessment, plan of care and goals were reviewed. ASSESSMENT Patient continues with skilled PT services and is slowly progressing towards goals. Pt continues to be limited by fear of falling, retropulsion, impaired sitting and standing balance, as well as need for increased time for task completion, decreased task initiation, decreased facial expression, and generalized rigidity (although no tremors noted). She did best with sit to stand using a chair forward facing her starting with her hands on the arm rests using a pull to stand technique. Unable to get her to standing using one hand on the walker and the other hand pushing off the bed, even with assist X 2 and blocking of her feet. Did multiple reps of sit to stand with facilitation of trunk flexion given propensity for an extension pattern. Unable to get her up to the chair without risk of injury to her or those assisting her. I recommend use of a guy lift for OOB and use of a recliner chair for her safety. She remains far from reported baseline and is a high fall risk. At this point I recommend rehab. Breadam Moralesscottie Current Level of Function Impacting Discharge (mobility/balance): up to mod assist X 2. Impaired sitting and standing balance. Other factors to consider for discharge: fall history, acute on CKD, possible PNA, now has a collado but does not have one at baseline. PLAN : 
Patient continues to benefit from skilled intervention to address the above impairments. Continue treatment per established plan of care. to address goals. Recommendation for discharge: (in order for the patient to meet his/her long term goals) Therapy 3 hours per day 5-7 days per week (working towards being able to participate in 3 hours a day of therapy), if not then SNF for rehab This discharge recommendation: A follow-up discussion with the attending provider and/or case management is planned IF patient discharges home will need the following DME: to be determined (TBD) SUBJECTIVE:  
 Patient stated I've fallen so many times.  OBJECTIVE DATA SUMMARY:  
Chart checked, pt cleared by nursing. Critical Behavior: 
Neurologic State: Alert Orientation Level: Oriented X4 Cognition: Follows commands Functional Mobility Training: 
Bed Mobility: 
  
Supine to Sit: Moderate assistance Sit to Supine: Moderate assistance;Assist x2 Transfers: 
Sit to Stand: Moderate assistance;Assist x2 Stand to Sit: Moderate assistance;Assist x2 Balance: 
Sitting: Impaired; Without support Sitting - Static: Fair (occasional) Sitting - Dynamic: Fair (occasional) Ambulation/Gait Training: 
  
  
  
  
  
  
  
  
  
  
  
  
  
  
  
  
  
 Unable to achieve Stairs: Therapeutic Exercises:  
 
Pain Rating: 
None rated Activity Tolerance:  
Fair and requires frequent rest breaks After treatment patient left in no apparent distress:  
Supine in bed, Call bell within reach, and Side rails x 3 
 
COMMUNICATION/COLLABORATION:  
The patients plan of care was discussed with: Occupational therapist and Registered nurseMacy Marx Shadow Time Calculation: 33 mins

## 2021-01-15 NOTE — PROGRESS NOTES
Problem: Self Care Deficits Care Plan (Adult) Goal: *Therapy Goal (Edit Goal, Insert Text) Description:  
FUNCTIONAL STATUS PRIOR TO ADMISSION: Patient was modified independent using a rolling walker for functional mobility. Patient completed basic ADL tasks with Mod I 
 
HOME SUPPORT: The patient lived with spouse but did not require assist. 
 
Occupational Therapy Goals Initiated 1/13/2021 1. Patient will perform grooming with modified independence within 7 day(s). 2.  Patient will perform bathing with modified independence within 7 day(s). 3.  Patient will perform lower body dressing with modified independence within 7 day(s). 4.  Patient will perform toilet transfers with modified independence within 7 day(s). 5.  Patient will perform all aspects of toileting with modified independence within 7 day(s). 6.  Patient will participate in upper extremity therapeutic exercise/activities with supervision/set-up for 10 minutes within 7 day(s). 7.  Patient will utilize energy conservation techniques during functional activities with verbal cues within 7 day(s). Outcome: Progressing Towards Goal 
 
OCCUPATIONAL THERAPY TREATMENT Patient: Tawana Rubalcava (72 y.o. female) Date: 1/15/2021 Diagnosis: Small bowel obstruction ([facility] [address] [K56.609] Small bowel obstruction ([facility] [address] Precautions: Fall Chart, occupational therapy assessment, plan of care, and goals were reviewed. ASSESSMENT Patient continues with skilled OT services and is progressing towards goals. Patient cleared to be seen, received in bed and agreeable to therapy. Patient completed bed mobility with min A to come to sitting EOB. Patient able to complete oral care/grooming sitting EOB with SBA. Patient completed functional mobility in room and on unit using RW with up to min A for RW management. Once standing at EOB, patient incontinent of bowel, required total A to change soiled brief and for pericare. Patient returned to supine to manage BLE and was left with call bell in reach, RN aware, all needs met. Will continue to follow, recommend D/C to IPR once medically cleared for D/C. Current Level of Function Impacting Discharge (ADLs): setup-min A for upper body ADLs, max-total A for lower body and toileting, up to min A for functional mobility. Other factors to consider for discharge: none PLAN : 
Patient continues to benefit from skilled intervention to address the above impairments. Continue treatment per established plan of care. to address goals. Recommend with staff: Recommend with nursing, once Egress Test completed, OOB to chair 3x/day, ADLs with supervision/setup, and toileting via beside commode with 1 assist and walker and gait belt . Thank you for completing as able in order to maintain patient strength, endurance and independence. Recommend next OT session: full dressing routine Recommendation for discharge: (in order for the patient to meet his/her long term goals) Therapy 3 hours per day 5-7 days per week This discharge recommendation: 
Has been made in collaboration with the attending provider and/or case management IF patient discharges home will need the following DME: patient owns DME required for discharge SUBJECTIVE:  
Patient stated Johnathon pelaez.  OBJECTIVE DATA SUMMARY:  
Cognitive/Behavioral Status: 
Neurologic State: Alert Cognition: Appropriate for age attention/concentration; Follows commands Perception: Cues to maintain midline in sitting;Verbal 
Perseveration: No perseveration noted Safety/Judgement: Awareness of environment; Fall prevention;Decreased insight into deficits Functional Mobility and Transfers for ADLs: 
Bed Mobility: 
Supine to Sit: Minimum assistance; Additional time;Assist x1 Scooting: Moderate assistance;Assist x1 Transfers: 
Sit to Stand: Moderate assistance;Assist x1((B) HHA) Balance: 
Sitting: Impaired Sitting - Static: Good (unsupported) Sitting - Dynamic: Fair (occasional) ADL Intervention: 
  
Grooming Position Performed: Seated edge of bed Washing Face: Set-up Brushing Teeth: Set-up Cues: Verbal cues provided Toileting Toileting Assistance: Total assistance(dependent) Bladder Hygiene: Total assistance (dependent) Bowel Hygiene: Total assistance (dependent) Clothing Management: Total assistance (dependent) Cues: Physical assistance for pants down;Physical assistance for pants up; Tactile cues provided;Verbal cues provided Adaptive Equipment: [facility] Cognitive Retraining Safety/Judgement: Awareness of environment; Fall prevention;Decreased insight into deficits Pain: 
Reporting no pain Activity Tolerance:  
Good After treatment patient left in no apparent distress:  
Supine in bed, Call bell within reach, Side rails x 3, and RN aware COMMUNICATION/COLLABORATION:  
The patients plan of care was discussed with: Physical therapist, Registered nurse, and Case management. Jovi Sullivan OT Time Calculation: 35 mins

## 2021-01-15 NOTE — PROGRESS NOTES
[facility] Adult  Hospitalist Group Hospitalist Progress Note Siva Macdonald MD 
Answering service: [phone] Date of Service:  1/15/2021 NAME:  Katharine Cunningham :  1935 MRN:  [id number] Admission Summary: This is an 62-year-old woman with past medical history significant for hypertension, consumptive thrombocytopenia status post splenectomy,presented to the ED and was admitted when she was found to have SBO on work up in the ED Interval history / Subjective: f/u SBO Patient  Seen and examined doing well and CM working to get rehab placement. Patient denied abdominal pain, nausea, vomiting or sob. She is   started on soft diet today. Rick Duran Assessment & Plan:  
Small bowel obstruction 
-CT A/P showed markedly distended, fluid-filled small bowel, stomach, and esophagus, compatible with distal small bowel obstruction. Massively distended urinary bladder, with no visualized mass or calculus. Patchy bibasilar airspace disease may represent pneumonia or aspiration. -FLD advance to soft diet today and monitor clinically -IV fluids taper off 
-GS  recommended conservative management - s/p NGT    
- f/u KUB today still with small bowel distention unchanged RICH from  obstructive uropathy  Resolved CKD stg 2 Acute urinary retention: resolved  
-CT A/P showed massively distended urinary bladder, with no visualized mass or calculus. 
-Collado in place(1/10) cont for now 
- creatinine normal now  S/o by renal 
- per Urology \" cont collado at d/c FU scheduled 21 at 10:20 AM with Dr. Trent Love at the [employer] Mild Hyponatremia/Hyperkalemia due to dehydration/rich: resolved with IVF, stopped  IVF Suspected  Asp Pneumonia CT a/p that showed patchy bibasilar airspace disease may represent pneumonia or aspiration. Patient afebrile and denied respiratory symptoms  
- completed Rocephin   and   Doxycycline course. -SARS COV 2 neg - CXR noted basilar opacities,   BNP  268 Hyperglycemia w/o history of DM. A1C 5.3. Hyperglycemia likely due to acute illness. Stable Hypertension   Hold  Hctz, and started   home meds amlodipine/lisinopril. Prn  Hydralazine if SBP >160. BP stable for now Suspected seizure disorder:continue PO keppra, seizure precautions Code status: full code DVT prophylaxis: SCD Care Plan discussed with: patient Anticipated Disposition:  Rehab  therapy Anticipated Discharge: if medically stable to rehab  tomorrow. CM on case. Hospital Problems  Date Reviewed: 1/10/2021 Codes Class Noted POA * (Principal) Small bowel obstruction ([facility] [address] ICD-10-CM: F16.583 ICD-9-CM: 560.9  1/10/2021 Yes Review of Systems: A comprehensive review of systems was negative except for that written in the HPI. Vital Signs:  
 Last 24hrs VS reviewed since prior progress note. Most recent are: 
Visit Vitals /68 Pulse 65 Temp 98.7 °F (37.1 °C) Resp 18 Ht 5' 6\" (1.676 m) Wt 62.8 kg (138 lb 7.2 oz) SpO2 95% BMI 22.35 kg/m² Intake/Output Summary (Last 24 hours) at 1/15/2021 0945 Last data filed at 1/14/2021 1846 Gross per 24 hour Intake 100 ml Output 1000 ml Net -900 ml Physical Examination:  
 
I had a face to face encounter with this patient and independently examined them on1/15/2021 as outlined below: 
     
Constitutional:  No acute distress, cooperative, pleasant HEENT:  Atraumatic. Oral mucosa moist    
Resp:  CTA bilaterally. No wheezing/rhonchi/rales. No accessory muscle use Chest Wall: No deformity CV:  Regular rhythm, normal rate, no murmurs, gallops, rubs GI:  Mildly distended,  non tender. :  collado  Musculoskeletal:  No edema, warm,    
 Neurologic:  Mental status:AAOx3,  
 Motor exam:Moves all extremities symmetrically 
  
   
  
 
Data Review:  
 Review and/or order of clinical lab test 
Review and/or order of tests in the radiology section of CPT 
Review and/or order of tests in the medicine section of CPT 
 
 
Labs:  
 
Recent Labs  
  01/14/21 
0752  
WBC 6.4  
HGB 9.5*  
HCT 29.4*  
  
 
Recent Labs  
  01/15/21 
0538 01/14/21 
0752  
 142  
K 3.8 3.7  
* 112*  
CO2 23 26  
BUN 11 12  
CREA 0.72 0.79  
GLU 79 99  
CA 8.6 8.5  
MG  --  1.9  
 
No results for input(s): ALT, AP, TBIL, TBILI, TP, ALB, GLOB, GGT, AML, LPSE in the last 72 hours. 
 
No lab exists for component: SGOT, GPT, AMYP, HLPSE 
No results for input(s): INR, PTP, APTT, INREXT, INREXT in the last 72 hours.  
No results for input(s): FE, TIBC, PSAT, FERR in the last 72 hours.  
No results found for: FOL, RBCF  
No results for input(s): PH, PCO2, PO2 in the last 72 hours. 
No results for input(s): CPK, CKNDX, TROIQ in the last 72 hours. 
 
No lab exists for component: CPKMB 
No results found for: CHOL, CHOLX, CHLST, CHOLV, HDL, HDLP, LDL, LDLC, DLDLP, TGLX, TRIGL, TRIGP, CHHD, CHHDX 
Lab Results  
Component Value Date/Time  
 Glucose (POC) 120 (H) 01/11/2021 03:47 PM  
 
Lab Results  
Component Value Date/Time  
 Color YELLOW/STRAW 01/11/2021 05:29 PM  
 Appearance CLEAR 01/11/2021 05:29 PM  
 Specific gravity 1.020 01/11/2021 05:29 PM  
 pH (UA) 5.5 01/11/2021 05:29 PM  
 Protein 30 (A) 01/11/2021 05:29 PM  
 Glucose Negative 01/11/2021 05:29 PM  
 Ketone TRACE (A) 01/11/2021 05:29 PM  
 Bilirubin Negative 01/11/2021 05:29 PM  
 Urobilinogen 0.2 01/11/2021 05:29 PM  
 Nitrites Negative 01/11/2021 05:29 PM  
 Leukocyte Esterase TRACE (A) 01/11/2021 05:29 PM  
 Epithelial cells FEW 01/11/2021 05:29 PM  
 Bacteria Negative 01/11/2021 05:29 PM  
 WBC 0-4 01/11/2021 05:29 PM  
 RBC 0-5 01/11/2021 05:29 PM  
 
 
 
 Medications Reviewed:  
 
Current Facility-Administered Medications Medication Dose Route Frequency  citalopram (CELEXA) tablet 10 mg  10 mg Oral DAILY  levETIRAcetam (KEPPRA) tablet 500 mg  500 mg Oral DAILY  HYDROmorphone (PF) (DILAUDID) injection 0.5 mg  0.5 mg IntraVENous Q4H PRN  
 lisinopriL (PRINIVIL, ZESTRIL) tablet 20 mg  20 mg Oral DAILY  amLODIPine (NORVASC) tablet 10 mg  10 mg Oral DAILY  docusate sodium (COLACE) capsule 100 mg  100 mg Oral BID  hydrALAZINE (APRESOLINE) 20 mg/mL injection 10 mg  10 mg IntraVENous Q6H PRN  
 sodium chloride (NS) flush 5-40 mL  5-40 mL IntraVENous Q8H  
 sodium chloride (NS) flush 5-40 mL  5-40 mL IntraVENous PRN  
 acetaminophen (TYLENOL) tablet 650 mg  650 mg Oral Q6H PRN Or  
 acetaminophen (TYLENOL) suppository 650 mg  650 mg Rectal Q6H PRN  promethazine (PHENERGAN) tablet 12.5 mg  12.5 mg Oral Q6H PRN Or  
 ondansetron (ZOFRAN) injection 4 mg  4 mg IntraVENous Q6H PRN  
 doxycycline (VIBRAMYCIN) 100 mg in 0.9% sodium chloride (MBP/ADV) 100 mL MBP  100 mg IntraVENous Q12H  
 
______________________________________________________________________ EXPECTED LENGTH OF STAY: 3d 4h 
ACTUAL LENGTH OF STAY:          5 Siva Yap MD

## 2021-01-15 NOTE — PROGRESS NOTES
General Surgery Daily Progress Note Admit Date: 1/10/2021 Post-Operative Day: * No surgery found * from * No surgery found * Subjective:  
 
Last 24 hrs: Pt is resting; not real talkative; says she is taking liquids ok, lots of liquids on bedside table; Says she feels \"tight\" KUB - unchanged from yesterday; mild diffuse small bowel distention. Still has collado for urinary retention Objective:  
 
Blood pressure 131/68, pulse 65, temperature 98.7 °F (37.1 °C), resp. rate 18, height 5' 6\" (1.676 m), weight 62.8 kg (138 lb 7.2 oz), SpO2 95 %. Temp (24hrs), Av.7 °F (37.1 °C), Min:98.6 °F (37 °C), Max:99 °F (37.2 °C) 
 
 
_____________________ Physical Exam:    
Alert, in no acute distress. Cardiovascular: RRR, no peripheral edema Abdomen: some distention, hypoactive BS,mild lower abd tenderness Assessment:  
Principal Problem: 
  Small bowel obstruction ([address] (1/10/2021) Plan:  
 
[facility] soft diet - already ordered by hospitalist 
OOB ? Voiding trial - per hospitalist 
 
Data Review: 
 
Recent Labs  
  21 
0752 WBC 6.4 HGB 9.5* HCT 29.4*  
 Recent Labs  
  01/15/21 
0538 21 
8097  142  
K 3.8 3.7 * 112* CO2 23 26 GLU 79 99 BUN 11 12 CREA 0.72 0.79 CA 8.6 8.5 MG  --  1.9 No results for input(s): AML, LPSE in the last 72 hours. ______________________ Medications: 
 
Current Facility-Administered Medications Medication Dose Route Frequency  citalopram (CELEXA) tablet 10 mg  10 mg Oral DAILY  levETIRAcetam (KEPPRA) tablet 500 mg  500 mg Oral DAILY  HYDROmorphone (PF) (DILAUDID) injection 0.5 mg  0.5 mg IntraVENous Q4H PRN  
 lisinopriL (PRINIVIL, ZESTRIL) tablet 20 mg  20 mg Oral DAILY  amLODIPine (NORVASC) tablet 10 mg  10 mg Oral DAILY  docusate sodium (COLACE) capsule 100 mg  100 mg Oral BID  hydrALAZINE (APRESOLINE) 20 mg/mL injection 10 mg  10 mg IntraVENous Q6H PRN  
  sodium chloride (NS) flush 5-40 mL  5-40 mL IntraVENous Q8H  
 sodium chloride (NS) flush 5-40 mL  5-40 mL IntraVENous PRN  
 acetaminophen (TYLENOL) tablet 650 mg  650 mg Oral Q6H PRN Or  
 acetaminophen (TYLENOL) suppository 650 mg  650 mg Rectal Q6H PRN  promethazine (PHENERGAN) tablet 12.5 mg  12.5 mg Oral Q6H PRN Or  
 ondansetron (ZOFRAN) injection 4 mg  4 mg IntraVENous Q6H PRN Job Mchugh NP 
1/15/2021 I have independently examined the patient and have reviewed the chart. I agree with the above plan. We will see how the soft diet goes, she does have mild distention still but no N/V. Hopefully bowel function will continue to improve. Following Ismael Nelson MD

## 2021-01-15 NOTE — PROGRESS NOTES
Transition of Care Plan RUR 7% Disposition   IPR  Accepted to [facility] Contact  Jenny Finley [phone] Weekend on call liaaraceli Williamson [phone] Transportation [facility] phone [phone] (2 assist to stand- fall risk,  Impaired balance Contact Eduardo Lawrence Loges  [phone] CM received call from Jenny fry [phone] informing Cm that patient was accepted to [facility] for inpatient rehab. Pratik perfect served hospitalist, Dr Cira Castro, and he responded that patient is not medically ready today-- maybe tomorrow. Pratik informed Kim Carver of this. Cm will contact on call liaJunito charles [phone], over the weekend if patient is medically ready for discharge. Hopefully [facility] will have a bed for patient iwhen she is ready. Cm spoke with patient and son to inform both of the acceptance to inpatient rehab. They were in agreement with this plan of inpatient rehab. Patient will be given 2nd medicare letter when ready for discharge. Cm to follow and assist with discharge plan of transferring to inpatient rehab when medically ready.

## 2021-01-15 NOTE — PROGRESS NOTES
Problem: Mobility Impaired (Adult and Pediatric) Goal: *Acute Goals and Plan of Care (Insert Text) Description: FUNCTIONAL STATUS PRIOR TO ADMISSION: History obtained from pt and her . Patient was used a rolling walker for mobility with assist prn. He reports that pt was needed increased assist for about the last 2 months. When asked they reported 4-5 falls in the last 12 months. He reported home health coming out to the house but was unable to answer my question if it was just PT or OT as well. HOME SUPPORT PRIOR TO ADMISSION: The patient lived alone with her  to provide assistance. They also care for a 3year old grandson regularly Physical Therapy Goals Initiated 1/12/2021 1. Patient will move from supine to sit and sit to supine , scoot up and down, and roll side to side in bed with supervision/set-up within 7 day(s). 2.  Patient will transfer from bed to chair and chair to bed with minimal assistance/contact guard assist using the least restrictive device within 7 day(s). 3.  Patient will perform sit to stand with minimal assistance/contact guard assist within 7 day(s). 4.  Patient will ambulate with minimal assistance/contact guard assist for 50 feet with the least restrictive device within 7 day(s). 5.  Patient will ascend/descend 2 stairs with one handrail(s) with minimal assistance/contact guard assist within 7 day(s). Outcome: Progressing Towards Goal 
PHYSICAL THERAPY TREATMENT Patient: Caterina French (80 y.o. female) Date: 1/15/2021 Diagnosis: Small bowel obstruction ([facility] [address] [K56.609] Small bowel obstruction ([facility] [address] Precautions: Fall Chart, physical therapy assessment, plan of care and goals were reviewed. ASSESSMENT Patient continues with skilled PT services and is progressing towards goals this session. Movement w/ activity is slow but demonstrates Min-Mod A x1 for bed mobility and functional transfers. Balance w/ RW fair + CGA w/ noted flexed posture, rounded shoulders, and fwd head. Also noted mild posterior shift in standing. She was able to complete gait x25 FT to hallway w/ CGA + 1. Slow pace/humberto noted. Verbal and minimal tactile cues for RW management as pt initially picking up RW vs pushing it. Current Level of Function Impacting Discharge (mobility/balance): CGA to Mod Ax1 for functional transfers and gait w/ RW +gait belt. Other factors to consider for discharge:  fall history, acute on CKD, possible PNA, now has a collado but does not have one at baseline PLAN : 
Patient continues to benefit from skilled intervention to address the above impairments. Continue treatment per established plan of care. to address goals. Recommendation for discharge: (in order for the patient to meet his/her long term goals) Therapy 3 hours per day 5-7 days per week This discharge recommendation: 
Has been made in collaboration with the attending provider and/or case management IF patient discharges home will need the following DME: to be determined (TBD) SUBJECTIVE:  
Patient stated You can do it.  speaking to OT OBJECTIVE DATA SUMMARY:  
Critical Behavior: 
Neurologic State: Alert Orientation Level: Oriented X4 Cognition: Appropriate for age attention/concentration, Follows commands Safety/Judgement: Awareness of environment, Fall prevention, Decreased insight into deficits Functional Mobility Training: 
Bed Mobility: 
  
Supine to Sit: Minimum assistance; Additional time;Assist x1 Scooting: Moderate assistance;Assist x1 Transfers: 
Sit to Stand: Moderate assistance;Assist x1((B) HHA) Stand to Sit: Moderate assistance;Assist x1 Balance: Sitting: Impaired Sitting - Static: Good (unsupported) Sitting - Dynamic: Fair (occasional) Ambulation/Gait Training: 
Distance (ft): 25 Feet (ft) Assistive Device: Gait belt;Walker, rolling Ambulation - Level of Assistance: Contact guard assistance;Assist x1 Gait Abnormalities: Decreased step clearance Base of Support: Center of gravity altered;Narrowed(posterior shift) Speed/Neida: Slow Step Length: Left shortened;Right shortened Therapeutic Exercises: Modified Egress test: LAQ's, Hip flexion (x10 each) Pain Rating: 
No verbal c/o pain Activity Tolerance:  
Fair After treatment patient left in no apparent distress:  
Supine in bed, Call bell within reach, and Side rails x 3 
 
COMMUNICATION/COLLABORATION:  
The patients plan of care was discussed with: Occupational therapist and Registered nurse. Letty Collier Time Calculation: 22 mins

## 2021-01-16 NOTE — PROGRESS NOTES
Transition of Care Plan: 
 
* RUR 7% * Transfer to [facility] [address] [facility] to call report to [phone] * [facility] scheduled for 18:30 
* EMTALA required- Dr. Sharla Mcmillan is the accepting physician * 2nd IM * Farnaz Flores. Notified Patient cleared for [facility] rehab and the facility will have a bed after 6 PM. Unable to reach Mr. Royal but was able to inform patient's son, Farnaz Manning Care Management Interventions PCP Verified by CM: Yes 
Palliative Care Criteria Met (RRAT>21 & CHF Dx)?: No 
Mode of Transport at Discharge: S Transition of Care Consult (CM Consult): Other MyChart Signup: No 
Discharge Durable Medical Equipment: No 
Health Maintenance Reviewed: Yes Physical Therapy Consult: Yes Occupational Therapy Consult: Yes Speech Therapy Consult: Yes Current Support Network: Lives with Spouse, Family Lives [place] Confirm Follow Up Transport: Family The Plan for Transition of Care is Related to the Following Treatment Goals : [facility] The Patient and/or Patient Representative was Provided with a Choice of Provider and Agrees with the Discharge Plan?: Yes Name of the Patient Representative Who was Provided with a Choice of Provider and Agrees with the Discharge Plan: Farnaz Manning Patient Freedom of Choice List was Provided with Basic Dialogue that Supports the Patient's Individualized Plan of Care/Goals, Treatment Preferences and Shares the Quality Data Associated with the Providers?: Yes [employer] Information Provided?: No 
Discharge Location Discharge Placement: Rehab hospital/unit acute Benedicto Monterroso LCSW, CUONG Complex Care Coordinator/Gulshan C: [phone]

## 2021-01-16 NOTE — DISCHARGE INSTRUCTIONS
Discharge Instructions       PATIENT ID: Olvin Nelson  MRN: [id number]   YOB: 1935    DATE OF ADMISSION: 1/10/2021  5:36 PM    DATE OF DISCHARGE: 1/16/2021    PRIMARY CARE PROVIDER: Maggie Mcgovern MD     ATTENDING PHYSICIAN: Ngozi Tinoco MD  DISCHARGING PROVIDER: Cathi Hood MD    To contact this individual call [phone] and ask the  to page. If unavailable ask to be transferred the Adult Hospitalist Department. DISCHARGE DIAGNOSES ***    CONSULTATIONS: IP CONSULT TO NEPHROLOGY  IP CONSULT TO UROLOGY    PROCEDURES/SURGERIES: * No surgery found *    PENDING TEST RESULTS:   At the time of discharge the following test results are still pending: ***    FOLLOW UP APPOINTMENTS:   Follow-up Information     Follow up With Specialties Details Why Herson Beckford MD Female Pelvic Medicine and Reconstructive Surgery, Gynecology On 1/21/2021 1/21/21 at 10:20 AM with Dr. Najma Mayer at the [facility] location. [phone] [id number]  [address] [facility] 61 [facility]    Maggie Mcgovern MD Family Medicine  after DC from [address]      Sarai Dangelo MD Female Pelvic Medicine and Reconstructive Surgery, Gynecology  Recommend follow-up with [place] urology upon discharge to discuss management of retention 2/2 urethral stricture  [facility]  [phone]             ADDITIONAL CARE RECOMMENDATIONS:     DIET: soft mechanical      ACTIVITY:per PT    WOUND CARE:none    Routine Bhatt care   Please drink fluids  Bowel care -- ensure you are having regular bowel movments        DISCHARGE MEDICATIONS:   See Medication Reconciliation Form    · It is important that you take the medication exactly as they are prescribed.    · Keep your medication in the bottles provided by the pharmacist and keep a list of the medication names, dosages, and times to be taken in your wallet. · Do not take other medications without consulting your doctor. NOTIFY YOUR PHYSICIAN FOR ANY OF THE FOLLOWING:   Fever over 101 degrees for 24 hours. Chest pain, shortness of breath, fever, chills, nausea, vomiting, diarrhea, change in mentation, falling, weakness, bleeding. Severe pain or pain not relieved by medications. Or, any other signs or symptoms that you may have questions about. Return in abdominal pain returns or Nausea or vomiting.   DISPOSITION:    Home With:   OT  PT  HH  RN      X SNF/Inpatient Rehab/LTAC    Independent/assisted living    Hospice    Other:     CDMP Checked:   Yes ***     PROBLEM LIST Updated:  Yes ***       Signed:   Vu Allison MD  1/16/2021  12:20 PM

## 2021-01-16 NOTE — PROGRESS NOTES
[address] [facility] Adult  Hospitalist Group Hospitalist Progress Note Melvin Dale MD 
Answering service: [phone] Date of Service:  2021 NAME:  Cyrus Mesa :  1935 MRN:  [id number] Admission Summary: This is an 82-year-old woman with past medical history significant for hypertension, consumptive thrombocytopenia status post splenectomy,presented to the ED and was admitted when she was found to have SBO on work up in the ED Interval history / Subjective: f/u SBO Patient is tolerating diet and has not complaints today. No N/V or abdomimal pain. Assessment & Plan:  
Small bowel obstruction 
-CT A/P showed markedly distended, fluid-filled small bowel, stomach, and esophagus, compatible with distal small bowel obstruction. Massively distended urinary bladder, with no visualized mass or calculus. Patchy bibasilar airspace disease may represent pneumonia or aspiration. 
-tolerating soft diet -IV fluids taper off 
-GS  recommended conservative management - s/p NGT    
- KUB yesterday with mild still with small bowel distention unchanged - GS feels patient stable and SBO resolved on  
- Will DC to [facility] today. - Discussed with Mr. Royal by phone today. RICH from  obstructive uropathy  Resolved CKD stg 2 Acute urinary retention: resolved  
-CT A/P showed massively distended urinary bladder, with no visualized mass or calculus. 
-Collado in place(1/10) cont for now 
- creatinine normal now  S/o by renal 
- per Urology \" cont collado at d/c FU scheduled 21 at 10:20 AM with Dr. Octavia Graham at the [facility] [employer] -ensure OP follow up Mild Hyponatremia/Hyperkalemia due to dehydration/rich: resolved with IVF, stopped  IVF Suspected  Asp Pneumonia CT a/p that showed patchy bibasilar airspace disease may represent pneumonia or aspiration. Patient afebrile and denied respiratory symptoms  
- completed Rocephin   and   Doxycycline course. -SARS COV 2 neg - CXR noted basilar opacities,   BNP  268 Hyperglycemia w/o history of DM. A1C 5.3. Hyperglycemia likely due to acute illness. Stable Hypertension   Hold  Hctz, and started   home meds amlodipine/lisinopril. Prn  Hydralazine if SBP >160. 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 Reading the hematology notes, she recommends discontinuing treatment with normal platelets. I will resume med on DC summary with explanation of above. Code status: full code DVT prophylaxis: SCD Care Plan discussed with: patient Anticipated Disposition:  Rehab  therapy Anticipated Discharge: if medically stable to rehab  tomorrow. CM on case. Hospital Problems  Date Reviewed: 1/10/2021 Codes Class Noted POA * (Principal) Small bowel obstruction ([facility] [address] ICD-10-CM: W06.632 ICD-9-CM: 560.9  1/10/2021 Yes Review of Systems: A comprehensive review of systems was negative except for that written in the HPI. Vital Signs:  
 Last 24hrs VS reviewed since prior progress note. Most recent are: 
Visit Vitals /61 Pulse 90 Temp 98.2 °F (36.8 °C) Resp 22 Ht 5' 6\" (1.676 m) Wt 61.7 kg (136 lb 1.6 oz) SpO2 96% BMI 21.97 kg/m² Intake/Output Summary (Last 24 hours) at 1/16/2021 1155 Last data filed at 1/15/2021 2040 Gross per 24 hour Intake  Output 875 ml Net -875 ml Physical Examination:  
 
I had a face to face encounter with this patient and independently examined them on1/16/2021 as outlined below: 
     
Constitutional:  No acute distress, cooperative, pleasant, soft spoken Resp: CTA bilaterally. No wheezing/rhonchi/rales. No accessory muscle use, Bilateral AE Chest Wall: No deformity CV:  Regular rhythm, normal rate, no murmurs, gallops, rubs, no crackles GI:  Mildly distended,  non tender. :  collado Musculoskeletal:  No edema, warm, Neurologic:  Mental status:AAOx3, Motor exam:Moves all extremities symmetrically Data Review:  
 Review and/or order of clinical lab test 
Review and/or order of tests in the radiology section of CPT Review and/or order of tests in the medicine section of CPT Labs:  
 
Recent Labs  
  01/16/21 
0444 01/14/21 
3661 WBC 5.5 6.4 HGB 9.5* 9.5* HCT 28.4* 29.4*  
 296 Recent Labs  
  01/16/21 
0444 01/15/21 
0538 01/14/21 
4181  142 142  
K 3.9 3.8 3.7 * 113* 112* CO2 20* 23 26 BUN 16 11 12 CREA 0.72 0.72 0.79 GLU 76 79 99  
CA 8.5 8.6 8.5 MG 1.7  --  1.9 No results for input(s): ALT, AP, TBIL, TBILI, TP, ALB, GLOB, GGT, AML, LPSE in the last 72 hours. No lab exists for component: SGOT, GPT, AMYP, HLPSE No results for input(s): INR, PTP, APTT, INREXT, INREXT in the last 72 hours. No results for input(s): FE, TIBC, PSAT, FERR in the last 72 hours. No results found for: FOL, RBCF No results for input(s): PH, PCO2, PO2 in the last 72 hours. No results for input(s): CPK, CKNDX, TROIQ in the last 72 hours. No lab exists for component: CPKMB No results found for: CHOL, CHOLX, CHLST, CHOLV, HDL, HDLP, LDL, LDLC, DLDLP, TGLX, TRIGL, TRIGP, CHHD, CHHDX Lab Results Component Value Date/Time Glucose (POC) 120 (H) 01/11/2021 03:47 PM  
 
Lab Results Component Value Date/Time  Color YELLOW/STRAW 01/11/2021 05:29 PM  
 Appearance CLEAR 01/11/2021 05:29 PM  
 Specific gravity 1.020 01/11/2021 05:29 PM  
 pH (UA) 5.5 01/11/2021 05:29 PM  
 Protein 30 (A) 01/11/2021 05:29 PM  
 Glucose Negative 01/11/2021 05:29 PM  
 Ketone TRACE (A) 01/11/2021 05:29 PM  
 Bilirubin Negative 01/11/2021 05:29 PM  
 Urobilinogen 0.2 01/11/2021 05:29 PM  
 Nitrites Negative 01/11/2021 05:29 PM  
 Leukocyte Esterase TRACE (A) 01/11/2021 05:29 PM  
 Epithelial cells FEW 01/11/2021 05:29 PM  
 Bacteria Negative 01/11/2021 05:29 PM  
 WBC 0-4 01/11/2021 05:29 PM  
 RBC 0-5 01/11/2021 05:29 PM  
 
 
 
Medications Reviewed:  
 
Current Facility-Administered Medications Medication Dose Route Frequency  citalopram (CELEXA) tablet 10 mg  10 mg Oral DAILY  levETIRAcetam (KEPPRA) tablet 500 mg  500 mg Oral DAILY  HYDROmorphone (PF) (DILAUDID) injection 0.5 mg  0.5 mg IntraVENous Q4H PRN  
 lisinopriL (PRINIVIL, ZESTRIL) tablet 20 mg  20 mg Oral DAILY  amLODIPine (NORVASC) tablet 10 mg  10 mg Oral DAILY  docusate sodium (COLACE) capsule 100 mg  100 mg Oral BID  hydrALAZINE (APRESOLINE) 20 mg/mL injection 10 mg  10 mg IntraVENous Q6H PRN  
 sodium chloride (NS) flush 5-40 mL  5-40 mL IntraVENous Q8H  
 sodium chloride (NS) flush 5-40 mL  5-40 mL IntraVENous PRN  
 acetaminophen (TYLENOL) tablet 650 mg  650 mg Oral Q6H PRN Or  
 acetaminophen (TYLENOL) suppository 650 mg  650 mg Rectal Q6H PRN  promethazine (PHENERGAN) tablet 12.5 mg  12.5 mg Oral Q6H PRN Or  
 ondansetron (ZOFRAN) injection 4 mg  4 mg IntraVENous Q6H PRN  
 
______________________________________________________________________ EXPECTED LENGTH OF STAY: 3d 4h 
ACTUAL LENGTH OF STAY:          Josephine Ragsdale MD

## 2021-01-16 NOTE — PROGRESS NOTES
Progress Note Patient: Ginger Chaparro MRN: [id number]  SSN: [id number] YOB: 1935  Age: 82 y.o. Sex: female Admit Date: 1/10/2021 * No surgery found * Procedure:   
 
Subjective:  
 
Patient tolerated diet and had BM Objective:  
 
Visit Vitals /61 Pulse 90 Temp 98.2 °F (36.8 °C) Resp 22 Ht 5' 6\" (1.676 m) Wt 136 lb 1.6 oz (61.7 kg) SpO2 96% BMI 21.97 kg/m² Temp (24hrs), Av.2 °F (36.8 °C), Min:98.1 °F (36.7 °C), Max:98.3 °F (36.8 °C) Physical Exam:   
Gen-Alert in NAd Abd- S/NT/ND Data Review: images and reports reviewed Lab Review: All lab results for the last 24 hours reviewed. Assessment:  
 
Hospital Problems  Date Reviewed: 1/10/2021 Codes Class Noted POA * (Principal) Small bowel obstruction ([facility] [address] ICD-10-CM: J39.576 ICD-9-CM: 560.9  1/10/2021 Yes Plan/Recommendations/Medical Decision Making: SBO- Seems to have resolved Agree with transfer to rehab.

## 2021-01-16 NOTE — DISCHARGE SUMMARY
Discharge Summary PATIENT ID: Tawana Rubalcava MRN: [id number] YOB: 1935 DATE OF ADMISSION: 1/10/2021  5:36 PM   
DATE OF DISCHARGE: 1/16/2021 PRIMARY CARE PROVIDER: Alisa Hanna MD  
 
ATTENDING PHYSICIAN: Princess Winchester MD 
DISCHARGING PROVIDER: Jessenia Díaz MD   
To contact this individual call [phone] and ask the  to page. If unavailable ask to be transferred the Adult Hospitalist Department. CONSULTATIONS: IP CONSULT TO NEPHROLOGY 
IP CONSULT TO UROLOGY General Surgery PROCEDURES/SURGERIES: * No surgery found * ADMITTING DIAGNOSES & HOSPITAL COURSE:  
Patient was admitted with SBO. Surgery consulted. Conservative course. Patient had BM 1/13 and diet advanced with toleration. Patient had collado placed and urology recommended leaving in place with OP followup. Renal consulted for RICH. This resolved, off HCTZ with IVFFs. Monitor closely. Patient with h/o ITP. Reading notes, it looks like her hematologist recommended stopping danazol. We stopped it here. I have resumed it with note that this is continued per patient wishes. Platelets are normal. 
 
 
 
DISCHARGE DIAGNOSES / PLAN:   
 
Small bowel obstruction 
-CT A/P showed markedly distended, fluid-filled small bowel, stomach, and esophagus, compatible with distal small bowel obstruction. Massively distended urinary bladder, with no visualized mass or calculus. Patchy bibasilar airspace disease may represent pneumonia or aspiration. 
-tolerating soft diet -IV fluids taper off 
-GS  recommended conservative management - s/p NGT    
- KUB yesterday with mild still with small bowel distention unchanged -  feels patient stable and SBO resolved on 1/14 
- Will DC to [facility] today. - Discussed with Mr. Royal by phone today.  
  
RICH from  obstructive uropathy  Resolved CKD stg 2  
 Acute urinary retention: resolved -CT A/P showed massively distended urinary bladder, with no visualized mass or calculus. 
-Collado in place(1/10) cont for now 
- creatinine normal now  S/o by renal 
- per Urology \" cont collado at d/c FU scheduled 1/21/21 at 10:20 AM with Dr. Self at the [facility]\". 
-ensure OP follow up 
  
Mild Hyponatremia/Hyperkalemia due to dehydration/carin: resolved with IVF, stopped  IVF 
  
Suspected  Asp Pneumonia  
CT a/p that showed patchy bibasilar airspace disease may represent pneumonia or aspiration.Patient afebrile and denied respiratory symptoms  
- completed Rocephin   and   Doxycycline course.  
-SARS COV 2 neg 
- CXR noted basilar opacities,   BNP  268 
  
Hyperglycemia w/o history of DM.A1C 5.3.  Hyperglycemia likely due to acute illness. Stable 
  
Hypertension   Hold  Hctz, and started   home meds amlodipine/lisinopril.  Prn  Hydralazine if SBP >160. BP stable for now 
  
Suspected seizure disorder:continue PO keppra, seizure precautions 
  
ITP -- has had normal platelets; Danazol was stopped on admit.  Reading the hematology notes, she recommends discontinuing treatment with normal platelets.  I will resume med on DC summary with explanation of above.   
  
   
 
ADDITIONAL CARE RECOMMENDATIONS:  
Collado care.  Keep collado in place and f/u with urology as OP. 
 
 
PENDING TEST RESULTS:  
At the time of discharge the following test results are still pending: none 
 
FOLLOW UP APPOINTMENTS:   
Follow-up Information   
 Follow up With Specialties Details Why Contact Adelia Britton MD Female Pelvic Medicine and Reconstructive Surgery, Gynecology On 1/21/2021 1/21/21 at 10:20 AM with Dr. Self at the [facility].  [address] [facility] [address] [phone] 
  
 [place] [address] 
[phone]  
 Kerri Abdullahi MD Family Medicine  after DC from rehab [facility] [address] 
[phone] Yolanda Quintana MD Female Pelvic Medicine and Reconstructive Surgery, Gynecology  Recommend follow-up with [facility] urology upon discharge to discuss management of retention 2/2 urethral stricture  [address] 
[phone] DIET: soft ACTIVITY: per PT Please encourage PO intake. Also ensure she is having regular BMs. DISCHARGE MEDICATIONS: 
Current Discharge Medication List  
  
START taking these medications Details  
docusate sodium (COLACE) 100 mg capsule Take 1 Cap by mouth two (2) times a day for 90 days. Qty: 60 Cap, Refills: 2 CONTINUE these medications which have NOT CHANGED Details  
danazoL (DANOCRINE) 200 mg capsule Take 1 Cap by mouth every other day. 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  
  
amLODIPine (NORVASC) 2.5 mg tablet TAKE 1 TABLET ONCE BY MOUTH EVERY DAY Refills: 0 FLUZONE HIGH-DOSE 2017-18, PF, syrg injection TO BE ADMINISTERED BY PHARMACIST FOR IMMUNIZATION Refills: 0  
  
lisinopril (PRINIVIL, ZESTRIL) 20 mg tablet TAKE 1 TABLET BY MOUTH EVERY DAY Qty: 90 Tab, Refills: 3 Associated Diagnoses: Essential hypertension  
  
citalopram (CELEXA) 10 mg tablet   
  
levetiracetam (KEPPRA) 500 mg tablet Take 500 mg by mouth daily. Associated Diagnoses: HTN (hypertension); Cardiac murmur STOP taking these medications  
  
 hydroCHLOROthiazide (HYDRODIURIL) 25 mg tablet Comments:  
Reason for Stopping:   
   
 naproxen sodium (ALEVE) 220 mg cap Comments:  
Reason for Stopping:   
   
 ciprofloxacin HCl (CIPRO) 250 mg tablet Comments:  
Reason for Stopping:   
   
 chlorhexidine (PERIDEX) 0.12 % solution Comments:  
Reason for Stopping: CALCIUM CARBONATE/VITAMIN D3 (CALCIUM + D PO) Comments:  
Reason for Stopping: NOTIFY YOUR PHYSICIAN FOR ANY OF THE FOLLOWING:  
Fever over 101 degrees for 24 hours. Chest pain, shortness of breath, fever, chills, nausea, vomiting, diarrhea, change in mentation, falling, weakness, bleeding. Severe pain or pain not relieved by medications. Or, any other signs or symptoms that you may have questions about. DISPOSITION: 
  Home With: 
 OT  PT  HH  RN  
  
 Long term SNF/Inpatient Rehab Independent/assisted living Hospice Other:  
 
 
PATIENT CONDITION AT DISCHARGE:  
 
Functional status Poor Deconditioned Independent Cognition Christinia Holiday Forgetful Dementia Catheters/lines (plus indication) Bhatt PICC   
 PEG None Code status Full code DNR   
 
PHYSICAL EXAMINATION AT DISCHARGE: 
See today's note. CHRONIC MEDICAL DIAGNOSES: 
Problem List as of 1/16/2021 Date Reviewed: 1/10/2021 Codes Class Noted - Resolved * (Principal) Small bowel obstruction ([facility] [address] ICD-10-CM: S67.832 ICD-9-CM: 560.9  1/10/2021 - Present Neutropenia ([facility] [address] ICD-10-CM: D70.9 ICD-9-CM: 288.00  4/17/2019 - Present ITP secondary to infection ICD-10-CM: D69.59 ICD-9-CM: 287.49  6/2/2017 - Present Murmur, cardiac ICD-10-CM: R01.1 ICD-9-CM: 785.2  10/3/2014 - Present Greater than 40 minutes were spent with the patient on counseling and coordination of care Signed:  
Jignesh Wilson MD 
1/16/2021 
12:23 PM

## 2021-02-27 PROBLEM — L89.159 SACRAL DECUBITUS ULCER: Status: ACTIVE | Noted: 2021-01-01

## 2021-02-28 NOTE — HOSPICE
[facility] Good Help to Those in Need 
[phone] Nursing Note Patient Name: Debra Astorga YOB: 1935 Age: 82 y.o. [facility] RN Note:  Hospice consult noted. Chart reviewed. Will assess patient and contact family. If there are any questions, please call [facility] at [phone]. Thank you for the opportunity to be of service to this patient and her family. 1525:  Spoke with Yoni Valdovinos and was provided update. Patient has only been on the floor for 1 hour. Assessed patient: facial grimacing with labored breathing. Attempt to talk with patient but patient did not respond and eyes stayed closed. Both hands cool to touch. Pt is turned to left side. Came out of room and spoke to Yoni Valdovinos and shared that patient is showing non-verbal signs/symptoms of pain. Belkis Lucero stated that patient has tylenol on [facility] will administer. At this time, patient does not meet IP hospice criteria but will monitor while at [facility] [place]. 1545:  Spoke with Hannah Vanegas Jr/son via phone. Explained that the patient does not meet IP hospice criteria at this time. Also reviewed Full Code v DNR. Hospice meeting is scheduled tomorrow, 3/1, at 10am at the patient's room with son and .

## 2021-02-28 NOTE — PROGRESS NOTES
Pharmacist Note - Vancomycin Dosing Consult provided for this 82 y.o. female for indication of a SSTI. Antibiotic regimen(s): Cefepime, Flagyl, and Vancomycin Patient on vancomycin PTA? NO Recent Labs  
  21 
0027 21 WBC 16.6* 13.7*  
CREA 1.00 1.19* BUN 67* 73* Frequency of BMP: daily Height: 167.6 cm Weight: 59.9 kg Est CrCl: ~40 ml/min Temp (24hrs), Av.7 °F (36.5 °C), Min:97.6 °F (36.4 °C), Max:97.8 °F (36.6 °C) Cultures: 
 Blood:  pending Goal trough = 10 - 15 mcg/mL Therapy will be initiated with a loading dose of 1250 mg IV x 1 to be followed by a maintenance dose of 1000 mg IV every 24 hours. Pharmacy to follow patient daily and order levels / make dose adjustments as appropriate.

## 2021-02-28 NOTE — H&P
[facility]  Hospitalist Group History and Physical 
 
Primary Care Provider: Johanne Almeida MD 
Date of Service:  2/27/2021 Subjective:  
 
Mary Lockett is a 82 y.o. female with hx HTN, seasonal allergic rhinitis, asplenia, and dementia who presents with worsening left hip wound, and sacral decubitus ulcer. She was on hospice, but rescinded hospice today. In the ED, initial BP was 99/33 (MAP 55). She was hypernatremic to 148, had a leukocytosis to 13.7, and normocytic anemia with Hgb 9. In the ED, she received 1Lns, and cefepime. , with flagyl and vanc pending. CT abdomen and pelvis were pending at the time of my eval. 
 
Review of Systems: A comprehensive review of systems was negative. Past Medical History:  
Diagnosis Date  Hypertension  Seasonal allergic rhinitis  Spleen absent Past Surgical History:  
Procedure Laterality Date  AR ABDOMEN SURGERY PROC UNLISTED    
 spleenectomy Prior to Admission medications Medication Sig Start Date End Date Taking? Authorizing Provider  
docusate sodium (COLACE) 100 mg capsule Take 1 Cap by mouth two (2) times a day for 90 days. 1/16/21 4/16/21  Ramesh Urbina MD  
danazoL (DANOCRINE) 200 mg capsule Take 1 Cap by mouth every other day. 8/21/20   Davina Landry MD  
amLODIPine (NORVASC) 2.5 mg tablet TAKE 1 TABLET ONCE BY MOUTH EVERY DAY 4/2/19   Provider, Historical  
FLUZONE HIGH-DOSE 2017-18, PF, syrg injection TO BE ADMINISTERED BY PHARMACIST FOR IMMUNIZATION 2/3/18   Provider, Historical  
lisinopril (PRINIVIL, ZESTRIL) 20 mg tablet TAKE 1 TABLET BY MOUTH EVERY DAY 3/15/18   Maryjane Torres MD  
citalopram (CELEXA) 10 mg tablet  8/7/17   Provider, Historical  
levetiracetam (KEPPRA) 500 mg tablet Take 500 mg by mouth daily. Provider, Historical  
 
Allergies Allergen Reactions  Aspirin Hives  Tramadol Seizures Family History Problem Relation Age of Onset  Coronary Artery Disease Neg Hx SOCIAL HISTORY: 
Patient resides at Home. Patient is bedbound. Smoking history: none Alcohol history: none Objective:  
 
 
Physical Exam:  
Visit Vitals BP (!) 99/33 (BP 1 Location: Left upper arm) Pulse 89 Temp 97.8 °F (36.6 °C) SpO2 97% General:  Does not respond to verbal, tactile, or painful stimuli, no acute distress Head:  Normocephalic, without obvious abnormality, atraumatic. Eyes:  Conjunctivae/corneas clear. EOMs intact. Throat: Lips, mucosa, and tongue normal. T  
Neck: Supple, symmetrical, trachea midline Back:   Symmetric, no curvature. ROM normal.  
Lungs:   Clear to auscultation bilaterally. Chest wall:  No tenderness or deformity. Heart:  Regular rate and rhythm, S1, S2 normal, no murmur, click, rub or gallop. Abdomen:   Soft, non-tender. Bowel sounds normal. No masses,  No organomegaly. Extremities: Extremities normal, atraumatic, no cyanosis or edema. Pulses: 2+ radial pulses Skin: Skin color, texture, turgor normal. No rashes or lesions. Cap refill: Brisk, less than 3 seconds Pulses: 2+, symmetric in all extremities ECG: ordered, and pending Data Review: All diagnostic labs and studies have been reviewed. Chest x-ray: decreased trace right pleural effusion and minimal right basilar opacity. Assessment:  
 
Active Problems: * No active hospital problems. * 
 
 
Plan: #Severe Sepsis with shock 
-leukocytosis to 13.7, MAP 55, acute renal insufficiency, sacral decub and left hip wound with necrosis 
-sepsis reassessment completed 
-lactic acid 1.6 
-s/p 1Lns, give another 1L LR now 
-continue vanc, cefepime, and flagyl 
-follow urine, and blood culture #Sacral Decub, left hip wound 
-continue abx 
-consult gen surg  
-consult wound care #Acute Renal Insuffieciency  
-BUN/creat 73/1.19 (b/l 16/0.7-0.9) -prerenal 2/2 hypotension, severe sepsis, dehydration 
-check Dorys, Ucr 
-1L ns 
-daily BMP #HTN 
-hold antihypertensives #Dementia 
-was on hospice, family rescinded today per report 
-consult palliative Attempted to call patient's  and rec'd no answer. Spoke with Patient's son to update him on patient's condition, and plan of care. 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 Advised that I would need to speak with patient's /MPOA about code status, prognosis, and goals of care urgently. He states that he will attempt to contact patient's  and call me back. Patient's son called me back with father stating that he would like to proceed with full code status. Pt's so expressed understanding of gaurded prognosis, and plans to have family meeting with patient's other children and  on 2/28/21. FUNCTIONAL STATUS PRIOR TO HOSPITALIZATION Bedbound Signed By: Maria Martins MD   
 February 27, 2021

## 2021-02-28 NOTE — PROGRESS NOTES
Patient arrived on floor with ED nurse. Patient came with bedding from home and a nasal canula in her nose that was not connected to anything. The NC was also from home. Personal belongings including the NC were placed in the room. Patient )2 stats were 100% on RA. A full body assessment was done and V/s were taken. Findings from the assessment. Bilateral red heels L ankle blister (outer) L foot pitting edema +2 
L inner thigh pressure ulcer II 
L breast abbrassion and excoriations R breast pressure ulcer R elbow skin tear. ( cleaned and dressed) Patient was cleaned and received a new gown. A waffer was placed under her sacrum to relieve some of the pressure. Open wounds were cleaned and dressed. Will be following up with wound team. Foam boots were placed on her feet to prevent further skin breakdown. Patient is non verbal but can understand and follows commands.

## 2021-02-28 NOTE — ED TRIAGE NOTES
Patient BIBA from home for wound check. Patient POA revoked hospice to get the patient treated. Reports a large sacral ulcer that home health has been unable to treat effectively.

## 2021-02-28 NOTE — PROGRESS NOTES
Clinical Pharmacy Note: Metronidazole Dosing Please note that the metronidazole dose for [place] has been changed to 500 mg IV q12h per [facility]-approved protocol. Please contact the pharmacy with any questions.  
 
[address]

## 2021-02-28 NOTE — PROGRESS NOTES
Spiritual Care Assessment/Progress Note [address] 
 
 
NAME: Tawana Rubalcava      MRN: [id number] AGE: 82 y.o. SEX: female Cheondoism Affiliation: Druze  
Language: English  
 
2/28/2021     Total Time (in minutes): 9 Spiritual Assessment begun in [facility] 5W1 ORTHO SPINE through conversation with: 
  
    [x]Patient        [] Family    [] Friend(s) Reason for Consult: Palliative Care, Initial/Spiritual Assessment Spiritual beliefs: (Please include comment if needed) 
   [] Identifies with a abdi tradition:     
   [] Supported by a abdi community:        
   [] Claims no spiritual orientation:       
   [] Seeking spiritual identity:            
   [] Adheres to an individual form of spirituality:       
   [x] Not able to assess:                   
 
    
Identified resources for coping:  
   [] Prayer                           
   [] Music                  [] Guided Imagery 
   [] Family/friends                 [] Pet visits [] Devotional reading                         [x] Unknown 
   [] Other:                                         
 
 
Interventions offered during this visit: (See comments for more details) Plan of Care: 
 
 [] Support spiritual and/or cultural needs  
 [] Support AMD and/or advance care planning process    
 [] Support grieving process 
 [] Coordinate Rites and/or Rituals  
 [] Coordination with community clergy [] No spiritual needs identified at this time 
 [] Detailed Plan of Care below (See Comments)  [] Make referral to Music Therapy 
[] Make referral to Pet Therapy    
[] Make referral to Addiction services 
[] Make referral to [facility] 
[] Make referral to Spiritual Care Partner 
[] No future visits requested       
[x] Follow up upon further referrals Comments:  visit per palliative consult. Pt was resting and did not wake to . Please contact [phone] [address]as for further support. [address] Quynh Barajas, [facility] 
 [address] ([address])

## 2021-02-28 NOTE — PROGRESS NOTES
[address] [facility] Adult  Hospitalist Group Hospitalist Progress Note Ha Fleming MD 
Answering service: [phone] from in house phone Date of Service:  2021 NAME:  Mariam Irby :  1935 MRN:  [id number] Admission Summary:  
Mariam Irby is a 82 y.o. female with hx HTN, seasonal allergic rhinitis, asplenia, and dementia who presents with worsening left hip wound, and sacral decubitus ulcer. She was on hospice, but rescinded hospice today. Interval history / Subjective:  
 patient awake but not following commands for me She does not appear to be distressed Assessment & Plan: #Severe Sepsis with shock- resolved 
-BP low stable, lactic acid normal, no fevers 
-cont IV vanc, cefepime, 
-follow urine, and blood culture 
  #Sacral Decub, left hip wound 
-continue abx 
-consult gen surg  
-consult wound care 
  
#Acute Renal Insuffieciency - resolved Creatinine now normal with IVF 
  
#Hypotensive 
-holding antihypertensives, cont IVF Hypernatremia-  
Due to poor PO intake 
correcting with IVFluids 
  
#Dementia w/ severe debility, adult failure to thrive 
-was on hospice, family rescinded  per reports 
-consult palliative Patient appropriate for hospice and do not recommend any surgical intervention or prolonged IV antibiotics Code status: FULL now was DNR prior to admission DVT prophylaxis: Heparin Care Plan discussed with: Patient/Family Anticipated Disposition: Home w/Family Anticipated Discharge: 24 hours to 48 hours Hospital Problems  Date Reviewed: 1/10/2021 Codes Class Noted POA Sacral decubitus ulcer ICD-10-CM: L89.159 ICD-9-CM: 707.03, 707.20  2021 Unknown Review of Systems:  
Review of systems not obtained due to patient factors. Vital Signs:  
 Last 24hrs VS reviewed since prior progress note. Most recent are: 
Visit Vitals BP (!) 100/42 (BP 1 Location: Left upper arm, BP Patient Position: At rest) Pulse 82 Temp 97.9 °F (36.6 °C) Resp 20 Ht 5' 6\" (1.676 m) Wt 59.9 kg (132 lb 0.9 oz) SpO2 99% BMI 21.31 kg/m² Intake/Output Summary (Last 24 hours) at 2/28/2021 4153 Last data filed at 2/27/2021 2200 Gross per 24 hour Intake  Output 400 ml Net -400 ml Physical Examination:  
 
I had a face to face encounter with this patient and independently examined them on 2/28/2021 as outlined below: 
 
     
Constitutional:  No acute distress, awake, cachetic appearing ENT:  Oral mucosa dry. Resp:  CTA bilaterally. No wheezing/rhonchi/rales. CV:  Regular rhythm, normal rate, no murmurs, GI:  Soft, non distended, non tender. n   
 Musculoskeletal: Diffuse muscle wasting and some contractures Neurologic:  not following any commands for me despite being awake and alert Skin= 
 
 
 
 
  
  
  
 
Data Review:  
 Review and/or order of clinical lab test 
Review and/or order of tests in the radiology section of CPT Review and/or order of tests in the medicine section of CPT Labs:  
 
Recent Labs  
  02/28/21 0027 02/27/21 2044 WBC 16.6* 13.7* HGB 8.1* 9.0*  
HCT 25.2* 28.3*  
 234 Recent Labs  
  02/28/21 0027 02/27/21 2044 * 148* K 3.7 3.7 * 117* CO2 23 26 BUN 67* 73* CREA 1.00 1.19* * 118* CA 8.8 9.1 Recent Labs  
  02/27/21 2044 ALT 94* * TBILI 0.5 TP 6.5 ALB 1.2*  
GLOB 5.3* No results for input(s): INR, PTP, APTT, INREXT in the last 72 hours. No results for input(s): FE, TIBC, PSAT, FERR in the last 72 hours. No results found for: FOL, RBCF No results for input(s): PH, PCO2, PO2 in the last 72 hours. Recent Labs  
  02/27/21 2044 TROIQ <0.05 No results found for: CHOL, CHOLX, CHLST, CHOLV, HDL, HDLP, LDL, LDLC, DLDLP, TGLX, TRIGL, TRIGP, CHHD, CHHDX Lab Results Component Value Date/Time Glucose (POC) 120 (H) 01/11/2021 03:47 PM  
 
Lab Results Component Value Date/Time Color YELLOW/STRAW 02/27/2021 08:44 PM  
 Appearance CLEAR 02/27/2021 08:44 PM  
 Specific gravity 1.020 01/11/2021 05:29 PM  
 pH (UA) 5.0 02/27/2021 08:44 PM  
 Protein Negative 02/27/2021 08:44 PM  
 Glucose Negative 02/27/2021 08:44 PM  
 Ketone Negative 02/27/2021 08:44 PM  
 Bilirubin Negative 02/27/2021 08:44 PM  
 Urobilinogen 0.2 02/27/2021 08:44 PM  
 Nitrites Negative 02/27/2021 08:44 PM  
 Leukocyte Esterase TRACE (A) 02/27/2021 08:44 PM  
 Epithelial cells FEW 02/27/2021 08:44 PM  
 Bacteria Negative 02/27/2021 08:44 PM  
 WBC 0-4 02/27/2021 08:44 PM  
 RBC 0-5 02/27/2021 08:44 PM  
 
 
 
Medications Reviewed:  
 
Current Facility-Administered Medications Medication Dose Route Frequency  cefepime (MAXIPIME) 2 g in 0.9% sodium chloride (MBP/ADV) 100 mL MBP  2 g IntraVENous Q24H  Vancomycin dosing per pharmacy   Other Rx Dosing/Monitoring  vancomycin (VANCOCIN) 1,000 mg in 0.9% sodium chloride 250 mL (VIAL-MATE)  1,000 mg IntraVENous Q24H  
 dextrose 5% infusion  75 mL/hr IntraVENous CONTINUOUS  
 levETIRAcetam (KEPPRA) 500 mg in 0.9% sodium chloride 100 mL IVPB  500 mg IntraVENous DAILY  sodium chloride (NS) flush 5-40 mL  5-40 mL IntraVENous Q8H  
 sodium chloride (NS) flush 5-40 mL  5-40 mL IntraVENous PRN  
 acetaminophen (TYLENOL) tablet 650 mg  650 mg Oral Q6H PRN Or  
 acetaminophen (TYLENOL) suppository 650 mg  650 mg Rectal Q6H PRN  
 
______________________________________________________________________ EXPECTED LENGTH OF STAY: - - - 
ACTUAL LENGTH OF STAY:          1 Melinda Morris MD

## 2021-02-28 NOTE — ACP (ADVANCE CARE PLANNING)
Advance Care Planning General Advance Care Planning (ACP) Conversation Date of Conversation: 2/27/2021 Conducted with: Healthcare Decision Maker: Next of Kin by law (only applies in absence of a Healthcare Power of  or Legal Guardian) Healthcare Decision Maker:  
  Primary Decision Maker: Meet Royal - Spouse - [phone] Click here to complete [place] including selection of the Healthcare Decision Maker Relationship (ie \"Primary\") Today we documented Decision Maker(s) consistent with Legal Next of Kin hierarchy. Content/Action Overview:  
no ACP docs Reviewed DNR/DNI and patient elects Full Code (Attempt Resuscitation) Topics discussed: Discussed goals of care,and code status. Patient's son has clear understanding of poor prognosis, and risk of deterioration, and is hesitant regaring full code status 2/2 concern that CPR will cause damage. I have advised him of the risks and benefits of CPR. Patient's //MPOA electes to continue with full code status at this time. Per son, they plan to have  family meetin hyun 2/28/21 regarding the matter. Length of Voluntary ACP Conversation in minutes:  20 minutes Alecia Rmaires MD

## 2021-02-28 NOTE — CONSULTS
Surgery Consult Subjective:  
  
Joaquín Kim is a 82 y.o. female who is being seen for evaluation of sacral decubitus ulcer and left hip wound. The patient was in the hospital back in January and apparently since then these wounds have gotten significantly larger. She went home on hospice at that time. She was getting intermittent wound care at home but appeared to be getting septic. Her family brought her back to the hospital and at that time rescinded her hospice. Patient is not really talking at the time of of history. Patient Active Problem List  
 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  
Diagnosis Date  Hypertension  Seasonal allergic rhinitis  Spleen absent Past Surgical History:  
Procedure Laterality Date  [place] ABDOMEN SURGERY PROC UNLISTED    
 spleenectomy Social History Tobacco Use  Smoking status: Never Smoker  Smokeless tobacco: Never Used Substance Use Topics  Alcohol use: No  
  Alcohol/week: 0.0 standard drinks Family History Problem Relation Age of Onset  Coronary Artery Disease Neg Hx Current Facility-Administered Medications Medication Dose Route Frequency  cefepime (MAXIPIME) 2 g in 0.9% sodium chloride (MBP/ADV) 100 mL MBP  2 g IntraVENous Q24H  Vancomycin dosing per pharmacy   Other Rx Dosing/Monitoring  vancomycin (VANCOCIN) 1,000 mg in 0.9% sodium chloride 250 mL (VIAL-MATE)  1,000 mg IntraVENous Q24H  
 dextrose 5% infusion  75 mL/hr IntraVENous CONTINUOUS  
 levETIRAcetam (KEPPRA) 500 mg in 0.9% sodium chloride 100 mL IVPB  500 mg IntraVENous DAILY  heparin (porcine) injection 5,000 Units  5,000 Units SubCUTAneous Q12H  
 sodium chloride (NS) flush 5-40 mL  5-40 mL IntraVENous Q8H  
 sodium chloride (NS) flush 5-40 mL  5-40 mL IntraVENous PRN  
 acetaminophen (TYLENOL) tablet 650 mg  650 mg Oral Q6H PRN Or  
 acetaminophen (TYLENOL) suppository 650 mg  650 mg Rectal Q6H PRN Allergies Allergen Reactions  Aspirin Hives  Tramadol Seizures Review of Systems:   
Pertinent items are noted in the History of Present Illness. Objective:  
 
  
Visit Vitals BP (!) 98/44 Pulse 85 Temp 97.9 °F (36.6 °C) Resp 27 Ht 5' 6\" (1.676 m) Wt 132 lb 0.9 oz (59.9 kg) SpO2 100% BMI 21.31 kg/m² Physical Exam: 
GENERAL: alert, no distress, appears stated age, EYE: negative, THROAT & NECK: normal, LUNG: clear to auscultation bilaterally, HEART: regular rate and rhythm, ABDOMEN: soft, non-tender. Bowel sounds normal. No masses,  no organomegaly, EXTREMITIES:  extremities normal, atraumatic, no cyanosis or edema, no edema, SKIN: Large sacral decubitus ulcer draining down to the bone, NEUROLOGIC: negative, PSYCH: non focal 
 
Imaging:  images and reports reviewed CT- Sacral decubitus ulcer with subcutaneous soft tissue gas and inflammation. No 
distinct drainable collection. There is probable bony erosion of the underlying 
coccyx concerning for osteomyelitis. Air extends into the epidural distal spinal 
canal. 
  
2. Moderate amount of rectal stool. No bowel obstruction or other acute 
intraperitoneal abnormality. Lab/Data Review: All lab results for the last 24 hours reviewed. Recent Results (from the past 24 hour(s)) CULTURE, BLOOD Collection Time: 02/27/21  8:44 PM  
 Specimen: Blood Result Value Ref Range Special Requests: NO SPECIAL REQUESTS Culture result: NO GROWTH AFTER 8 HOURS    
CULTURE, BLOOD Collection Time: 02/27/21  8:44 PM  
 Specimen: Blood Result Value Ref Range Special Requests: NO SPECIAL REQUESTS Culture result: NO GROWTH AFTER 8 HOURS    
URINALYSIS W/ REFLEX CULTURE Collection Time: 02/27/21  8:44 PM  
 Specimen: Urine Result Value Ref Range Color YELLOW/STRAW Appearance CLEAR CLEAR    
 pH (UA) 5.0 5.0 - 8.0 Protein Negative NEG mg/dL Glucose Negative NEG mg/dL Ketone Negative NEG mg/dL Bilirubin Negative NEG Blood Negative NEG Urobilinogen 0.2 0.2 - 1.0 EU/dL Nitrites Negative NEG Leukocyte Esterase TRACE (A) NEG    
 WBC 0-4 0 - 4 /hpf  
 RBC 0-5 0 - 5 /hpf Epithelial cells FEW FEW /lpf Bacteria Negative NEG /hpf  
 UA:UC IF INDICATED CULTURE NOT INDICATED BY UA RESULT CNI METABOLIC PANEL, COMPREHENSIVE Collection Time: 02/27/21  8:44 PM  
Result Value Ref Range Sodium 148 (H) 136 - 145 mmol/L Potassium 3.7 3.5 - 5.1 mmol/L Chloride 117 (H) 97 - 108 mmol/L  
 CO2 26 21 - 32 mmol/L Anion gap 5 5 - 15 mmol/L Glucose 118 (H) 65 - 100 mg/dL BUN 73 (H) 6 - 20 MG/DL Creatinine 1.19 (H) 0.55 - 1.02 MG/DL  
 BUN/Creatinine ratio 61 (H) 12 - 20 GFR est AA 53 (L) >60 ml/min/1.73m2 GFR est non-AA 43 (L) >60 ml/min/1.73m2 Calcium 9.1 8.5 - 10.1 MG/DL Bilirubin, total 0.5 0.2 - 1.0 MG/DL  
 ALT (SGPT) 94 (H) 12 - 78 U/L  
 AST (SGOT) 131 (H) 15 - 37 U/L Alk. phosphatase 171 (H) 45 - 117 U/L Protein, total 6.5 6.4 - 8.2 g/dL Albumin 1.2 (L) 3.5 - 5.0 g/dL Globulin 5.3 (H) 2.0 - 4.0 g/dL A-G Ratio 0.2 (L) 1.1 - 2.2    
CBC WITH AUTOMATED DIFF Collection Time: 02/27/21  8:44 PM  
Result Value Ref Range WBC 13.7 (H) 3.6 - 11.0 K/uL  
 RBC 3.09 (L) 3.80 - 5.20 M/uL HGB 9.0 (L) 11.5 - 16.0 g/dL HCT 28.3 (L) 35.0 - 47.0 % MCV 91.6 80.0 - 99.0 FL  
 MCH 29.1 26.0 - 34.0 PG  
 MCHC 31.8 30.0 - 36.5 g/dL  
 RDW 16.7 (H) 11.5 - 14.5 % PLATELET 202 326 - 957 K/uL MPV 12.1 8.9 - 12.9 FL  
 NRBC 0.1 (H) 0  WBC ABSOLUTE NRBC 0.02 (H) 0.00 - 0.01 K/uL NEUTROPHILS 88 (H) 32 - 75 % LYMPHOCYTES 6 (L) 12 - 49 % MONOCYTES 4 (L) 5 - 13 % EOSINOPHILS 1 0 - 7 % BASOPHILS 0 0 - 1 % IMMATURE GRANULOCYTES 1 (H) 0.0 - 0.5 % ABS. NEUTROPHILS 12.2 (H) 1.8 - 8.0 K/UL  
 ABS. LYMPHOCYTES 0.8 0.8 - 3.5 K/UL  
 ABS. MONOCYTES 0.5 0.0 - 1.0 K/UL  
 ABS. EOSINOPHILS 0.1 0.0 - 0.4 K/UL  
 ABS. BASOPHILS 0.0 0.0 - 0.1 K/UL  
 ABS. IMM. GRANS. 0.1 (H) 0.00 - 0.04 K/UL  
 DF SMEAR SCANNED    
 RBC COMMENTS ANISOCYTOSIS 1+ 
    
 RBC COMMENTS BRIAN CELLS 
PRESENT 
    
TROPONIN I Collection Time: 02/27/21  8:44 PM  
Result Value Ref Range Troponin-I, Qt. <0.05 <0.05 ng/mL LACTIC ACID Collection Time: 02/27/21  8:51 PM  
Result Value Ref Range Lactic acid 1.6 0.4 - 2.0 MMOL/L  
SAMPLES BEING HELD Collection Time: 02/27/21  8:51 PM  
Result Value Ref Range SAMPLES BEING HELD 1RED,1BLUE   
 COMMENT Add-on orders for these samples will be processed based on acceptable specimen integrity and analyte stability, which may vary by analyte. EKG, 12 LEAD, INITIAL Collection Time: 02/27/21 10:18 PM  
Result Value Ref Range Ventricular Rate 73 BPM  
 Atrial Rate 73 BPM  
 P-R Interval 140 ms QRS Duration 86 ms  
 Q-T Interval 356 ms  
 QTC Calculation (Bezet) 392 ms Calculated P Axis 68 degrees Calculated R Axis 72 degrees Calculated T Axis 69 degrees Diagnosis Normal sinus rhythm Nonspecific T wave abnormality When compared with ECG of 28-MAR-2006 07:08, Nonspecific T wave abnormality now evident in Inferior leads Nonspecific T wave abnormality now evident in Lateral leads SODIUM, UR, RANDOM Collection Time: 02/27/21 11:29 PM  
Result Value Ref Range Sodium,urine random 42 MMOL/L  
CREATININE, UR, RANDOM Collection Time: 02/27/21 11:29 PM  
Result Value Ref Range Creatinine, urine 31.60 mg/dL METABOLIC PANEL, BASIC Collection Time: 02/28/21 12:27 AM  
Result Value Ref Range Sodium 149 (H) 136 - 145 mmol/L Potassium 3.7 3.5 - 5.1 mmol/L Chloride 117 (H) 97 - 108 mmol/L  
 CO2 23 21 - 32 mmol/L Anion gap 9 5 - 15 mmol/L Glucose 121 (H) 65 - 100 mg/dL  BUN 67 (H) 6 - 20 MG/DL Creatinine 1.00 0.55 - 1.02 MG/DL  
 BUN/Creatinine ratio 67 (H) 12 - 20 GFR est AA >60 >60 ml/min/1.73m2 GFR est non-AA 53 (L) >60 ml/min/1.73m2 Calcium 8.8 8.5 - 10.1 MG/DL  
CBC WITH AUTOMATED DIFF Collection Time: 02/28/21 12:27 AM  
Result Value Ref Range WBC 16.6 (H) 3.6 - 11.0 K/uL  
 RBC 2.73 (L) 3.80 - 5.20 M/uL HGB 8.1 (L) 11.5 - 16.0 g/dL HCT 25.2 (L) 35.0 - 47.0 % MCV 92.3 80.0 - 99.0 FL  
 MCH 29.7 26.0 - 34.0 PG  
 MCHC 32.1 30.0 - 36.5 g/dL  
 RDW 17.0 (H) 11.5 - 14.5 % PLATELET 104 039 - 110 K/uL MPV 11.5 8.9 - 12.9 FL  
 NRBC 0.0 0  WBC ABSOLUTE NRBC 0.00 0.00 - 0.01 K/uL NEUTROPHILS 89 (H) 32 - 75 % LYMPHOCYTES 6 (L) 12 - 49 % MONOCYTES 3 (L) 5 - 13 % EOSINOPHILS 1 0 - 7 % BASOPHILS 0 0 - 1 % IMMATURE GRANULOCYTES 1 (H) 0.0 - 0.5 % ABS. NEUTROPHILS 14.7 (H) 1.8 - 8.0 K/UL  
 ABS. LYMPHOCYTES 1.0 0.8 - 3.5 K/UL  
 ABS. MONOCYTES 0.6 0.0 - 1.0 K/UL  
 ABS. EOSINOPHILS 0.1 0.0 - 0.4 K/UL  
 ABS. BASOPHILS 0.0 0.0 - 0.1 K/UL  
 ABS. IMM. GRANS. 0.2 (H) 0.00 - 0.04 K/UL  
 DF AUTOMATED Assessment:plan Large sacral decubitus ulcer I spent about 15 minutes talking to the patient's  and other 10 talking to the patient's son about what their goals of care are. The  is willing to defer to the son's decision. I explained to her son that she would require at least 1-2 operations in order to completely clean of these wounds. Additionally given that there is bony involvement, and that she is nonmobile it is unlikely that these wounds will ever truly heal.  In discussion he states that they really brought her back to the hospital as his father is not able to care for her even with home hospice alone at home. He would like her to go to inpatient hospice. At this point I would not recommend surgical intervention is their ultimate goal is hospice.   Obviously if this should change we will take her to the operating room to debride out this wound and she may or may not require a colostomy for this as well.

## 2021-02-28 NOTE — PROGRESS NOTES
Primary Nurse Tien Acosta and Nubia West RN performed a dual skin assessment on this patient Impairment noted- see wound doc flow sheet Brendon score is 9

## 2021-02-28 NOTE — ED NOTES
TRANSFER - OUT REPORT: 
 
Verbal report given to ASHELY Mullen(name) on [facility]  being transferred to [facility]) for routine progression of care Report consisted of patients Situation, Background, Assessment and  
Recommendations(SBAR). Information from the following report(s) SBAR, MAR and Recent Results was reviewed with the receiving nurse. Lines:  
Peripheral IV 02/27/21 Right Hand (Active) Peripheral IV 02/27/21 Left Forearm (Active) Opportunity for questions and clarification was provided. Patient transported with: 
 Monitor Registered Nurse

## 2021-03-01 NOTE — PROGRESS NOTES
Information obtained from review of chart and nursing staff. Ms. Gretchen Booker appears comfortable today. Tm 97.9 Tc 97.3 HR: 75 BP: 114/58 Resp Rate: 16 96% sat on room air. No intake or output data in the 24 hours ending 03/01/21 0841 Exam: Cor: RRR. Lungs: Bilateral breath sounds. Clear to auscultation. Abd: Soft. Non distended. Non tender. No guarding or rebound. Labs:  
Recent Results (from the past 12 hour(s)) METABOLIC PANEL, BASIC Collection Time: 03/01/21  1:56 AM  
Result Value Ref Range Sodium 152 (H) 136 - 145 mmol/L Potassium 3.6 3.5 - 5.1 mmol/L Chloride 121 (H) 97 - 108 mmol/L  
 CO2 24 21 - 32 mmol/L Anion gap 7 5 - 15 mmol/L Glucose 114 (H) 65 - 100 mg/dL BUN 56 (H) 6 - 20 MG/DL Creatinine 0.73 0.55 - 1.02 MG/DL  
 BUN/Creatinine ratio 77 (H) 12 - 20 GFR est AA >60 >60 ml/min/1.73m2 GFR est non-AA >60 >60 ml/min/1.73m2 Calcium 9.2 8.5 - 10.1 MG/DL  
CBC WITH AUTOMATED DIFF Collection Time: 03/01/21  1:56 AM  
Result Value Ref Range WBC 14.9 (H) 3.6 - 11.0 K/uL  
 RBC 2.86 (L) 3.80 - 5.20 M/uL HGB 8.4 (L) 11.5 - 16.0 g/dL HCT 26.3 (L) 35.0 - 47.0 % MCV 92.0 80.0 - 99.0 FL  
 MCH 29.4 26.0 - 34.0 PG  
 MCHC 31.9 30.0 - 36.5 g/dL  
 RDW 17.0 (H) 11.5 - 14.5 % PLATELET 710 583 - 615 K/uL MPV 11.4 8.9 - 12.9 FL  
 NRBC 0.2 (H) 0  WBC ABSOLUTE NRBC 0.03 (H) 0.00 - 0.01 K/uL NEUTROPHILS 88 (H) 32 - 75 % LYMPHOCYTES 6 (L) 12 - 49 % MONOCYTES 3 (L) 5 - 13 % EOSINOPHILS 2 0 - 7 % BASOPHILS 0 0 - 1 % IMMATURE GRANULOCYTES 1 (H) 0.0 - 0.5 % ABS. NEUTROPHILS 13.1 (H) 1.8 - 8.0 K/UL  
 ABS. LYMPHOCYTES 0.9 0.8 - 3.5 K/UL  
 ABS. MONOCYTES 0.5 0.0 - 1.0 K/UL  
 ABS. EOSINOPHILS 0.2 0.0 - 0.4 K/UL  
 ABS. BASOPHILS 0.0 0.0 - 0.1 K/UL  
 ABS. IMM. GRANS. 0.2 (H) 0.00 - 0.04 K/UL  
 DF AUTOMATED    
NPO for now. Continue IVF - D5W at 75 ml/hour.   
IV antibiotics as ordered - Cefepime and Vancomycin. Wound care nurses to see. Hospice input - noted. Family meeting at 8 AM. DVT prophylaxis - SC Heparin. Pain medication and anti-emetics as needed. Plans per Dr. Trinidad Pack.

## 2021-03-01 NOTE — HOSPICE
MSW called and spoke to patient's son Fina Yuen to discuss adult group home options. MSW emailed a list of group homes to Fina Yuen at [email or web address]. Fingal Said stated that he will call the group homes to see if one will accept the patient. Thank you for the opportunity to be good help to this patient. KIERSTEN Wong Hospice Social Worker [phone] Time in: 4:10 Time Out: 4:30

## 2021-03-01 NOTE — PROGRESS NOTES
Day #2 of Vancomycin Indication:  SSTI Current regimen:  1000 mg IV Q24hrs Abx regimen:  Vancomycin and cefepime ID Following ?: No 
Concomitant nephrotoxic drugs (requires more frequent monitoring): None Frequency of BMP?: Daily Recent Labs 21 
3857 21 
6885 21 
2044 WBC 14.9* 16.6* 13.7*  
CREA 0.73 1.00 1.19* BUN 56* 67* 73* Est CrCl: 45-50 ml/min; UO: --- ml/kg/hr Temp (24hrs), Av.8 °F (36.6 °C), Min:97.3 °F (36.3 °C), Max:98.5 °F (36.9 °C) Cultures:  
 Blood: GPC in clusters  bottle - pending  Blood: NGTD - pending  Blood: NGTD - pending Goal trough = 10 - 15 mcg/mL Recent trough history (date/time/level/dose/action taken): 
None thus far Plan: Change to 1000 mg IV Q 36 hrs for a predicted trough of ~11.5 mcg/mL . Pharmacy will continue to monitor this patient daily for changes in clinical status and renal function.

## 2021-03-01 NOTE — PROGRESS NOTES
[facility]osp[facility] at [facility] RUR-12% Low 
 
CM noted hospice consults and placed referral to [facility]. CM to monitor. Argentina Ardon, MS 
 
 
 
11:40 CM attempted phone call to [facility] at [facility], left voice message with admissions to follow up with CM regarding transitional care plan-referral sent via allscripts. Per Hospice, patient will transition to SNF under LTC with hospice. [facility] will need to establish a contract with facility. Patient will need UAI and reportedly has initiated Medicaid application. CM to monitor. Argentina Ardon MS 
 
12:07 Reason for Admission:   Home Hospice Revoked, large sacral wound RUR Score:   12% Low Plan for utilizing home health:      No needs PCP: First and Last name:  Dr. Cindy Peterson Name of Practice:  
 Are you a current patient: Yes/No:  
 Approximate date of last visit:  
 Can you participate in a virtual visit with your PCP:  
*Family reports patient was seeing Visiting Physicians* Current Advanced Directive/Advance Care Plan:  DNR Healthcare Decision Maker:   Carlos Yost, Sr. [phone] Click here to complete [address] including selection of the Healthcare Decision Maker Relationship (ie \"Primary\") Transition of Care Plan:       CM met with patient,  and son to inform of CM role and to review transitional care plan. All are on board for transition to [facility] [phone]. Patient was opened to [facility] Patient is bed bound. Family provided care at home as no other arrangements were made. Patient has [place] Medicare A&B and also has [employer] supplement-family does not have card. CM made contact with [facility] to inform referral has been placed in allscripts. CM to monitor. Trent Liu,MS

## 2021-03-01 NOTE — HOSPICE
MSW received phone call from patient's son Jatinder Brown that the family would like to utilize [facility] for discharge planning. Juani Half reported that Sandra Pendleton has availability and he and the patient's spouse would tour [facility] as soon as possible. MSW answered Suzan Barnett general questions about adult homes. MSW stated that hospital team will follow up on discharge plan. Thank you for the opportunity to be good help to this patient. KIERSTEN Zapata Hospice Social Worker [phone] Time In: 17:44 Time Out: 18:00

## 2021-03-01 NOTE — PROGRESS NOTES
[address] [facility] Adult  Hospitalist Group Hospitalist Progress Note Melinda Morris MD 
Answering service: [phone] from in house phone Date of Service:  3/1/2021 NAME:  Malu Carias :  1935 MRN:  [id number] Admission Summary:  
Malu Carias is a 82 y.o. female with hx HTN, seasonal allergic rhinitis, asplenia, and dementia who presents with worsening left hip wound, and sacral decubitus ulcer. She was on hospice, but rescinded hospice today. Interval history / Subjective:  
 patient awake but not following commands for me She does not appear to be distressed She did say yes when the nurse asked her if she was in pain Assessment & Plan: #Severe Sepsis with shock- resolved 
-BP low stable, lactic acid normal, no fevers 
-cont IV vanc, cefepime, 
-follow urine, and blood culture 
  #Sacral Decub, left hip wound 
-continue abx 
-consult gen surg  
-consulted wound care 
  
#Acute Renal Insuffieciency - resolved Creatinine now normal with IVF 
  
#Hypotensive 
-holding antihypertensives, cont IVF Hypernatremia-  
Due to poor PO intake 
correcting with IVFluids 
  
#Dementia w/ severe debility, adult failure to thrive 
-was on hospice, family rescinded  per reports 
-consult palliative and hospice today- plans for family meeting with hospice today Patient appropriate for hospice and do not recommend any surgical intervention or prolonged IV antibiotics Code status: FULL now was DNR prior to admission DVT prophylaxis: Heparin Care Plan discussed with: Patient/Family Anticipated Disposition: Home w/Family Anticipated Discharge: 24 hours to 48 hours Hospital Problems  Date Reviewed: 1/10/2021 Codes Class Noted POA Sacral decubitus ulcer ICD-10-CM: L89.159 ICD-9-CM: 707.03, 707.20  2021 Unknown Review of Systems: Review of systems not obtained due to patient factors. Vital Signs:  
 Last 24hrs VS reviewed since prior progress note. Most recent are: 
Visit Vitals BP (!) 110/56 (BP 1 Location: Left upper arm, BP Patient Position: At rest) Pulse 77 Temp 98.5 °F (36.9 °C) Resp 14 Ht 5' 6\" (1.676 m) Wt 52 kg (114 lb 10.2 oz) SpO2 95% BMI 18.50 kg/m² No intake or output data in the 24 hours ending 03/01/21 0925 Physical Examination:  
 
I had a face to face encounter with this patient and independently examined them on 3/1/2021 as outlined below: 
 
     
Constitutional:  No acute distress, awake, cachetic appearing ENT:  Oral mucosa dry. Resp:  CTA bilaterally. No wheezing/rhonchi/rales. CV:  Regular rhythm, normal rate, no murmurs, GI:  Soft, non distended, non tender. n   
 Musculoskeletal: Diffuse muscle wasting and some contractures Neurologic:  not following any commands for me despite being awake and alert Skin= 
 
 
 
 
  
  
  
 
Data Review:  
 Review and/or order of clinical lab test 
Review and/or order of tests in the radiology section of CPT Review and/or order of tests in the medicine section of CPT Labs:  
 
Recent Labs 03/01/21 
7347 02/28/21 
6088 WBC 14.9* 16.6* HGB 8.4* 8.1* HCT 26.3* 25.2*  
 262 Recent Labs 03/01/21 
9492 02/28/21 
5883 02/27/21 
2044 * 149* 148* K 3.6 3.7 3.7 * 117* 117* CO2 24 23 26 BUN 56* 67* 73* CREA 0.73 1.00 1.19* * 121* 118* CA 9.2 8.8 9.1 Recent Labs  
  02/27/21 
2044 ALT 94* * TBILI 0.5 TP 6.5 ALB 1.2*  
GLOB 5.3* No results for input(s): INR, PTP, APTT, INREXT, INREXT in the last 72 hours. No results for input(s): FE, TIBC, PSAT, FERR in the last 72 hours. No results found for: FOL, RBCF No results for input(s): PH, PCO2, PO2 in the last 72 hours. Recent Labs  
  02/27/21 2044 TROIQ <0.05 No results found for: CHOL, CHOLX, CHLST, CHOLV, HDL, HDLP, LDL, LDLC, DLDLP, TGLX, TRIGL, TRIGP, CHHD, CHHDX Lab Results Component Value Date/Time Glucose (POC) 120 (H) 01/11/2021 03:47 PM  
 
Lab Results Component Value Date/Time Color YELLOW/STRAW 02/27/2021 08:44 PM  
 Appearance CLEAR 02/27/2021 08:44 PM  
 Specific gravity 1.020 01/11/2021 05:29 PM  
 pH (UA) 5.0 02/27/2021 08:44 PM  
 Protein Negative 02/27/2021 08:44 PM  
 Glucose Negative 02/27/2021 08:44 PM  
 Ketone Negative 02/27/2021 08:44 PM  
 Bilirubin Negative 02/27/2021 08:44 PM  
 Urobilinogen 0.2 02/27/2021 08:44 PM  
 Nitrites Negative 02/27/2021 08:44 PM  
 Leukocyte Esterase TRACE (A) 02/27/2021 08:44 PM  
 Epithelial cells FEW 02/27/2021 08:44 PM  
 Bacteria Negative 02/27/2021 08:44 PM  
 WBC 0-4 02/27/2021 08:44 PM  
 RBC 0-5 02/27/2021 08:44 PM  
 
 
 
Medications Reviewed:  
 
Current Facility-Administered Medications Medication Dose Route Frequency  morphine injection 1 mg  1 mg IntraVENous Q4H PRN  
 cefepime (MAXIPIME) 2 g in 0.9% sodium chloride (MBP/ADV) 100 mL MBP  2 g IntraVENous Q24H  Vancomycin dosing per pharmacy   Other Rx Dosing/Monitoring  vancomycin (VANCOCIN) 1,000 mg in 0.9% sodium chloride 250 mL (VIAL-MATE)  1,000 mg IntraVENous Q24H  
 dextrose 5% infusion  75 mL/hr IntraVENous CONTINUOUS  
 levETIRAcetam (KEPPRA) 500 mg in 0.9% sodium chloride 100 mL IVPB  500 mg IntraVENous DAILY  heparin (porcine) injection 5,000 Units  5,000 Units SubCUTAneous Q12H  
 sodium chloride (NS) flush 5-40 mL  5-40 mL IntraVENous Q8H  
 sodium chloride (NS) flush 5-40 mL  5-40 mL IntraVENous PRN  
 acetaminophen (TYLENOL) tablet 650 mg  650 mg Oral Q6H PRN Or  
 acetaminophen (TYLENOL) suppository 650 mg  650 mg Rectal Q6H PRN  
 
______________________________________________________________________ EXPECTED LENGTH OF STAY: - - - 
ACTUAL LENGTH OF STAY:          2 Melinda Morris MD

## 2021-03-01 NOTE — HOSPICE
[address] [facility] to Those in Need 
[phone] Patient Name: Ady Maldonado YOB: 1935 Age: 82 y.o. [address] RN Note:  Hospice consult noted. Chart reviewed. Plan of care discussed with patients nurse & care manager. In to meet with spouse and son. Discussed Hospice philosophy, general plan of care, levels of care, services and on call procedures. Family information packet provided & reviewed with family. Family would like for her to go to [facility] with support of hospice care as spouse is not able to care for her at home. Son has already spoken with Felicia Abad at [facility] and I spoke with her today as well. She would like hospitals to fax her some info on patient as she can't get into Epic at this time. I spoke with Ishan Brown, and she will make contact with facility with plan for her to go there tomorrow. If bed available, we have a nurse who can complete the admission at 3pm tomorrow. Would need transport between 1-2. Will also need UAI started while here at [facility] 
 
Thank you for the opportunity to be of service to this patient. Parrish Medel RN Clinical Nurse Liaison [address] [phone]

## 2021-03-01 NOTE — WOUND CARE
WOCN Note:  
 
New consult placed for assessment of multiple wounds present on admission. Room 541 PPE: face shield, mask and gloves. Chart reviewed. Admitted DX:  Sacral decubitus ulcer Past Medical History:  
Diagnosis Date  Hypertension  Seasonal allergic rhinitis  Spleen absent Assessment:  
Patient is alert, non verbal and requires assist of 2 with repositioning. Bed: foam mattress Patient has a Bhatt. Patient premedicated by RN. Patient repositioned on left side with pillow. Heels offloaded with offloading boots. All wounds present on admission 1. POA Sacral, Pressure Injury Stage 4: 19 x 13 x 3 cm  70% black 30% red; large serosanguinous exudate; with malodor. Periwound edges with non blanching purple erythema. 2.  Left hip, Pressure injury unstageable:  3.8 x 8 x 0 cm; 100% dry eschar. 3.  Right breast, partial thickness wound:  5 x 1 x 0.1 cm; 50% red 50% yellow; no exudate, odor or erythema. 4.  Right breast, DTI; 3 x 0.5 x 0 cm; 100% non blanching purple. 5.  Left breast, partial thickness wound:  1 x 0.5 x 0.1 cm; 100% pink; no exudate, odor or erythema. 6.  Left breast, DTI:  4 x 0.5 x 0 cm; 100% non blanching purple. 7.  Right elbow, partial thickness wound:  2.5 x 2 x 0.1 cm; 100% moist red; no odor or erythema. 8.  Right side of back, DTI:  Clustered along field 8 x 2 x 0 cm; non blanching purple. 9.  Left inner thigh, partial thickness wound:  1 x 0.5 x 0.1 cm; 80% pink 20% yellow. 10.  Left medial foot, DTI:  2 x 1.2 x 0 cm; non blanching purple. 11.  Left ankle, Fluid filled bullae with non blanching purple base:  3 x 2 x 0 cm. 12.  Left lateral 5th toe, DTI:  1 x 2 x 0 cm; 100% non blanching purple. 13.  Left lateral foot, DTI: 1 x 1 x 0 cm; non blanching purple. 14.  Left knee, blanching pink erythema:  8 x 7 x 0 cm. 15.  Right knee, fluid filled bullae 3 x 3.5 x 0 cm with adjacent DTI 1 x 1 x 0 cm. 16.  Right low leg, partial thickness wound:  5 x 2 x 0.1 cm; 100% moist red; no odor or erthema. 17.  Right ankle, DTI; 2.2 x 2 x 0 cm; non blanching purple. 18.  Right lateral foot, DTI:  1.5 x 0.5 x 0 cm; non blanching purple. 19.  Right lateral foot, DTI:  2 x 1.5 x 0 cm; non blanching purple. 20. Right heel, pressure Injury stage 1: 1 x 3 x 0 cm Wound, Pressure Prevention & Skin Care Recommendations: 1. Minimize layers of linen/pads under patient to optimize support surface. 2.  Turn/reposition approximately every 2 hours and offload heels. Patient mobility team to assist with q2 turns. 3.  Manage moisture/ Keep skin folds clean and dry. 4.  Specialty bed: prius ordered via [facility]. Use only flat sheet and one incontinence pad. 
5.  Sacrum:  Daily cleanse with Dakins; apply Silver Alginate (Opticel Ag); cover with dry dressing. 6.  Left hip:  Protect with foam. 
7.  Right breast, Left breast, left ankle, bilateral heels, left medial foot, left lateral foot and 5th toe, left knee, right knee, right ankle, right lateral foot and right back:  Apply Venelex BID 8. Right elbow and right low leg:  Daily cleanse with saline; apply Xeroform; cover with dry dressing. 9. Left inner thigh:  Apply Zinc cream BID. Discussed above plan with Stacey Mendoza. Transition of Care: Plan to follow as needed while admitted to hospital. 
 
BILL LopezN RN [facility] [place] Inpatient Wound Care Available on Perfect Serve Pager [phone] Office [phone]

## 2021-03-01 NOTE — PROGRESS NOTES
TRANSFER - OUT REPORT: 
 
1200: Verbal report given by Jessika Sutton RN to Morelia Mcmahon (name) on Tawana Form  being transferred to  (unit) for routine progression of care Report consisted of patients Situation, Background, Assessment and  
Recommendations(SBAR). Information from the following report(s) SBAR, Kardex, ED Summary, Procedure Summary, Intake/Output, MAR, Recent Results and Cardiac Rhythm NSR was reviewed with the receiving nurse. Lines:  
Peripheral IV 02/27/21 Right Hand (Active) Site Assessment Clean, dry, & intact 02/28/21 1649 Phlebitis Assessment 0 02/28/21 1649 Infiltration Assessment 0 02/28/21 1649 Dressing Status Clean, dry, & intact 02/28/21 1649 Dressing Type Tape;Transparent 02/28/21 1100 Hub Color/Line Status Pink; Infusing 02/28/21 1100 Action Taken Open ports on tubing capped 02/28/21 1100 Alcohol Cap Used Yes 02/28/21 1100 Peripheral IV 02/27/21 Left Forearm (Active) Site Assessment Clean, dry, & intact 02/28/21 1649 Phlebitis Assessment 0 02/28/21 1649 Infiltration Assessment 0 02/28/21 1649 Dressing Status Clean, dry, & intact 02/28/21 1100 Dressing Type Tape;Transparent 02/28/21 1100 Hub Color/Line Status Pink; Infusing 02/28/21 1100 Action Taken Open ports on tubing capped 02/28/21 1100 Alcohol Cap Used Yes 02/28/21 1100 Opportunity for questions and clarification was provided. Patient transported with: 
 Registered Nurse Pt belongings I have reviewed and agree with the charting of Jessika Sutton RN.
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
## 2021-03-02 NOTE — PROGRESS NOTES
Notified that patient's rapid COVID test came back positive. Notified Dr. Trinidad Pack who does not want PCR, just ordered to notify hospice. Will notify hospice now RN called family to update on transfer to 2N room 211

## 2021-03-02 NOTE — HOSPICE
Patient will be admitted tomorrow at 10 am to [facility]. CM has arranged transport for 9 am from [facility] via [facility]. DME- ELSIE mattress has been ordered from [email or web address] [facility] for delivery today between 3-5. SRK will be delivered this afternoon by [employer]. CTI provided by Dr. Wheeler Both to Shayla Olivera RN for [facility] 
 
CM, please arrange for 2-3 days of wound care supplies to go home with patient to cover her until hospice supplies can be ordered and delivered. Dave Farr RN MSN [facility] Nurse Liaison [facility] [phone] ( mobile) [phone] ( office)

## 2021-03-02 NOTE — HOSPICE
[facility] Help to Those in Need 
[phone] Patient Name: Vicente Linares YOB: 1935 Age: 82 y.[facility]. [facility]   
Received word from primary nurse that patient's rapid Covid test came back postive. This will postpone indefinitely her discharge to [address] as they do not accept patient with Covid 19 infection. DME and Medications have been cancelled. Hospice will work with CM on next steps for discharge as family's financial resources will not support LTC admission. Patient is Medicaid pending. Thank you for the opportunity to be of service to this patient.

## 2021-03-02 NOTE — HOSPICE
[address] [employer] Worker Note: 
 
LCSW coordinated discharge of pt with family and Group home. Pt will be going to [facility],[address] [facility] with [address]. Contact there is Holly ( [phone]). Admission is now arranged for 3/3/2021 at 10 am.  [facility] has confirmed transport for 3/3/2021 at [address] [place] [address]. Family will be present upon admission. Dawn MELARAW,  [employer] Worker [phone]

## 2021-03-02 NOTE — PROGRESS NOTES
[facility]-Hospice at [facility] RUR-13% Low 
 
CM met with hospice SW to discuss transitional care plan. Patient's family has agreed to have patient placed at [facility] [address] [facility] [address] [facility], [address] [place] [address] [phone] [facility] Patient is now planned for admission tomorrow.  [facility] has confirmed transport for 3/3/21 at [address] [facility]

## 2021-03-02 NOTE — PROGRESS NOTES
Bedside and Verbal shift change report given to Сергей Stevens (oncoming nurse) by Michel Flores (offgoing nurse). Report included the following information SBAR, Kardex and MAR.

## 2021-03-02 NOTE — PROGRESS NOTES
TRANSFER - IN REPORT: 
 
Verbal report received from [facility] (name) on [place]  being received from 5W (unit) for routine progression of care Report consisted of patients Situation, Background, Assessment and  
Recommendations(SBAR). Information from the following report(s) SBAR, Kardex, [facility] and Recent Results was reviewed with the receiving nurse. Opportunity for questions and clarification was provided. Assessment completed upon patients arrival to unit and care assumed.

## 2021-03-02 NOTE — PROGRESS NOTES
TRANSFER - OUT REPORT: 
 
Verbal report given to Marsha Vuong (name) on Mila Moeller  being transferred to  (unit) for routine progression of care Report consisted of patients Situation, Background, Assessment and  
Recommendations(SBAR). Information from the following report(s) SBAR, MAR and Recent Results was reviewed with the receiving nurse. Lines:  
Peripheral IV 03/02/21 Posterior;Right Forearm (Active) Opportunity for questions and clarification was provided. Patient transported with: 
 Registered Nurse Tech

## 2021-03-03 NOTE — PROGRESS NOTES
[facility]-Hospice RUR-13% Low 
 
CM left voice message with SBAR for unit CM and updated previously scheduled [facility] transport to [facility] phone [phone]. Patient was planned for admission to [address] [place] [address] with [facility] today. Patient now COVID positive and per hospice note facility can not accept at this time. Unit CM to follow.   
 
Enid Murphy, MS

## 2021-03-03 NOTE — PROGRESS NOTES
Bedside and Verbal shift change report given to [facility] [address] (oncoming nurse) by Cruz Jung RN (offgoing nurse). Report included the following information SBAR, Kardex, MAR and Recent Results.

## 2021-03-03 NOTE — PROGRESS NOTES
[address] [facility] Adult  Hospitalist Group Hospitalist Progress Note Radha Atkins MD 
Answering service: [phone] from in house phone Date of Service:  3/2/2021 NAME:  Olvin Nelson :  1935 MRN:  [id number] Admission Summary:  
Olvin Nelson is a 82 y.o. female with hx HTN, seasonal allergic rhinitis, asplenia, and dementia who presents with worsening left hip wound, and sacral decubitus ulcer. She was on hospice, but rescinded hospice today. Interval history / Subjective:  
 patient awake but not following commands for me She does not appear to be distressed She did say yes when the nurse asked her if she was in pain Assessment & Plan: #Severe Sepsis with shock- resolved 
-BP low stable, lactic acid normal, no fevers 
-cont IV vanc, cefepime, 
-follow urine, and blood culture 
  #Sacral Decub, left hip wound 
-continue abx 
-consult gen surg  
-consulted wound care 
  
#Acute Renal Insuffieciency - resolved Creatinine now normal with IVF 
  
#Hypotensive 
-holding antihypertensives, cont IVF Hypernatremia-  
Due to poor PO intake 
correcting with IVFluids 
  
#Dementia w/ severe debility, adult failure to thrive 
-was on hospice, palliative care met with family and plans for patient to be discharged tomorrow to a group home on hospice- COVID test ordered Code status changed to DNR Patient appropriate for hospice and do not recommend any surgical intervention or prolonged IV antibiotics Code status: FULL now was DNR prior to admission DVT prophylaxis: Heparin Care Plan discussed with: Patient/Family Anticipated Disposition: Home w/Family Anticipated Discharge: 24 hours to 48 hours Hospital Problems  Date Reviewed: 1/10/2021 Codes Class Noted POA Sacral decubitus ulcer ICD-10-CM: L89.159 ICD-9-CM: 707.03, 707.20  2021 Unknown Review of Systems:  
Review of systems not obtained due to patient factors. Vital Signs:  
 Last 24hrs VS reviewed since prior progress note. Most recent are: 
Visit Vitals BP (!) 110/52 (BP 1 Location: Left upper arm, BP Patient Position: Supine) Pulse 85 Temp 98.8 °F (37.1 °C) Resp 14 Ht 5' 6\" (1.676 m) Wt 52 kg (114 lb 10.2 oz) SpO2 97% BMI 18.50 kg/m² Intake/Output Summary (Last 24 hours) at 3/2/2021 2029 Last data filed at 3/2/2021 3743 Gross per 24 hour Intake 100 ml Output 1000 ml Net -900 ml Physical Examination:  
 
I had a face to face encounter with this patient and independently examined them on 3/2/2021 as outlined below: 
 
     
Constitutional:  No acute distress, awake, cachetic appearing ENT:  Oral mucosa dry. Resp:  CTA bilaterally. No wheezing/rhonchi/rales. CV:  Regular rhythm, normal rate, no murmurs, GI:  Soft, non distended, non tender. n   
 Musculoskeletal: Diffuse muscle wasting and some contractures Neurologic:  not following any commands for me despite being awake and alert Skin= 
 
 
 
 
  
  
  
 
Data Review:  
 Review and/or order of clinical lab test 
Review and/or order of tests in the radiology section of CPT Review and/or order of tests in the medicine section of CPT Labs:  
 
Recent Labs 03/01/21 
3438 02/28/21 
4230 WBC 14.9* 16.6* HGB 8.4* 8.1* HCT 26.3* 25.2*  
 262 Recent Labs 03/01/21 
5931 02/28/21 
3299 02/27/21 
2044 * 149* 148* K 3.6 3.7 3.7 * 117* 117* CO2 24 23 26 BUN 56* 67* 73* CREA 0.73 1.00 1.19* * 121* 118* CA 9.2 8.8 9.1 Recent Labs  
  02/27/21 
2044 ALT 94* * TBILI 0.5 TP 6.5 ALB 1.2*  
GLOB 5.3* No results for input(s): INR, PTP, APTT, INREXT, INREXT in the last 72 hours. No results for input(s): FE, TIBC, PSAT, FERR in the last 72 hours. No results found for: FOL, RBCF No results for input(s): PH, PCO2, PO2 in the last 72 hours. Recent Labs  
  02/27/21 2044 TROIQ <0.05 No results found for: CHOL, CHOLX, CHLST, CHOLV, HDL, HDLP, LDL, LDLC, DLDLP, TGLX, TRIGL, TRIGP, CHHD, CHHDX Lab Results Component Value Date/Time Glucose (POC) 120 (H) 01/11/2021 03:47 PM  
 
Lab Results Component Value Date/Time Color YELLOW/STRAW 02/27/2021 08:44 PM  
 Appearance CLEAR 02/27/2021 08:44 PM  
 Specific gravity 1.020 01/11/2021 05:29 PM  
 pH (UA) 5.0 02/27/2021 08:44 PM  
 Protein Negative 02/27/2021 08:44 PM  
 Glucose Negative 02/27/2021 08:44 PM  
 Ketone Negative 02/27/2021 08:44 PM  
 Bilirubin Negative 02/27/2021 08:44 PM  
 Urobilinogen 0.2 02/27/2021 08:44 PM  
 Nitrites Negative 02/27/2021 08:44 PM  
 Leukocyte Esterase TRACE (A) 02/27/2021 08:44 PM  
 Epithelial cells FEW 02/27/2021 08:44 PM  
 Bacteria Negative 02/27/2021 08:44 PM  
 WBC 0-4 02/27/2021 08:44 PM  
 RBC 0-5 02/27/2021 08:44 PM  
 
 
 
Medications Reviewed:  
 
Current Facility-Administered Medications Medication Dose Route Frequency  morphine injection 1 mg  1 mg IntraVENous Q4H PRN  
 vancomycin (VANCOCIN) 1,000 mg in 0.9% sodium chloride 250 mL (VIAL-MATE)  1,000 mg IntraVENous Q36H  
 sodium hypochlorite (QUARTER STRENGTH DAKIN'S) 0.125% irrigation (bottle)   Topical DAILY  balsam peru-castor oiL (VENELEX) ointment   Topical BID  cefepime (MAXIPIME) 2 g in 0.9% sodium chloride (MBP/ADV) 100 mL MBP  2 g IntraVENous Q24H  Vancomycin dosing per pharmacy   Other Rx Dosing/Monitoring  dextrose 5% infusion  75 mL/hr IntraVENous CONTINUOUS  
 levETIRAcetam (KEPPRA) 500 mg in 0.9% sodium chloride 100 mL IVPB  500 mg IntraVENous DAILY  heparin (porcine) injection 5,000 Units  5,000 Units SubCUTAneous Q12H  
 sodium chloride (NS) flush 5-40 mL  5-40 mL IntraVENous Q8H  
 sodium chloride (NS) flush 5-40 mL  5-40 mL IntraVENous PRN  
 acetaminophen (TYLENOL) tablet 650 mg  650 mg Oral Q6H PRN  Or  acetaminophen (TYLENOL) suppository 650 mg  650 mg Rectal Q6H PRN  
 
______________________________________________________________________ EXPECTED LENGTH OF STAY: 4d 19h ACTUAL LENGTH OF STAY:          3 Celsa Michelle MD

## 2021-03-04 NOTE — HOSPICE
Patient was scheduled for hospice admission on Tuesday 3/2 at [facility]. Admission derailed by unexpected positive Covid test.  
Family not able to self pay for LTC, Medicaid coverage pending. At this time, there is no safe disposition for the patient. She may meet GIP LOC due to wounds, however, with disposition concerns and life expectancy uncertain, we would not admit her while she was still receiving fluids, heparin, etc.. Discussed with hospitalist who reports he will consult the Palliative team for discussions about comfort care. Olu Sawyer RN MSN [facility] Nurse Liaison Jefe Johnson [phone] ( mobile) [phone] ( office)

## 2021-03-04 NOTE — CONSULTS
Palliative Medicine Consult Gulshan: [phone]) Patient Name: Derek Hooks YOB: 1935 Date of Initial Consult: 3/4/21 Reason for Consult: Care decisions Requesting Provider: Darian Madrigal Primary Care Physician: Joey Vega MD 
 SUMMARY:  
Derek Hooks is a 82 y.o. with a past history of end stage dementia  who was admitted on 2/27/2021 from home after Hospice sent them to the hospital due to sacral decubitus and other wounds. Found to have severe septic shock, on IV abx for wounds. CT A/{ showing concern for osteomyelitis of the coccyx. Surgery consulted and recommend conservative care, as there is bony involvement, would need mult surgeries and did not feel wounds would ever heal.  
 
Family meeting held w/  Franky Powers and enma Hernandez- cannot care for pt at home and were asking for IP hospice but at time of meeting 3/1 did not meet IP criteria. Plan was for hospice at a group home until testing for COVID came back positive. Current medical issues leading to Palliative Medicine involvement include:care decisions Social: Pt  to Franky Powers, enma Hernandez also involved. They cannot care for pt at home. PALLIATIVE DIAGNOSES:  
1. End stage dementia 2. Decub ulcer and mult wounds 3. End of life care PLAN:  
1. Pt had hospice at home, but was revoked when came to the hospital for wounds. 2. Note hospice conversations w/ family and plan was for hospice and comfort measures at group home - would have been discharged by now, but screening test came back + for COVID, thus kept on abx and IVF. 3. Based on conversations, family was okay w/ just local wound care and sx management. 4. See pt today, has required some IV morphine for pain, not responsive for me. 5. Messages left on both  and son's phones. Recommend to them to start hospice/comfort here w/ removing IV abx and IVF, labs and treating sx. She may transition into IP hospice based on how she looks today. 6. If I cannot reach family today, request primary team to reach back out tmrw for comfort recommendation. 7. Initial consult note routed to primary continuity provider and/or primary health care team members 8. Communicated plan of care with: Palliative IDT, [facility] [address] Team incl Rita Bowie RN w/ hospice and Liza Davies care management, could not perfect serve Dr Agustín Reid GOALS OF CARE / TREATMENT PREFERENCES:  
 
GOALS OF CARE: 
Patient/Health Care Proxy Stated Goals: Comfort TREATMENT PREFERENCES:  
Code Status: DNR Advance Care Planning: 
[x] The [facility] Interdisciplinary Team has updated the ACP Navigator with [address] and Patient Capacity Primary Decision MakerAmdionte Young Spouse - [phone] No flowsheet data found. Medical Interventions: Limited additional interventions Other: As far as possible, the palliative care team has discussed with patient / health care proxy about goals of care / treatment preferences for patient. HISTORY:  
 
History obtained from: chart, staff CHIEF COMPLAINT: Cannot obtain due to patient factors HPI/SUBJECTIVE: The patient is:  
[] Verbal and participatory [x] Non-participatory due to: medical condition NAD, resting, not interacting w/ me . Clinical Pain Assessment (nonverbal scale for severity on nonverbal patients):  
Clinical Pain Assessment Severity: 0 Activity (Movement): Lying quietly, normal position Duration: for how long has pt been experiencing pain (e.g., 2 days, 1 month, years) Frequency: how often pain is an issue (e.g., several times per day, once every few days, constant) FUNCTIONAL ASSESSMENT:  
 
Palliative Performance Scale (PPS): PPS: 20 
 
 
 PSYCHOSOCIAL/SPIRITUAL SCREENING:  
 
Palliative IDT has assessed this patient for cultural preferences / practices and a referral made as appropriate to needs (Cultural Services, Patient Advocacy, Ethics, etc.) Any spiritual / Amish concerns: 
[] Yes /  [x] No 
 
Caregiver Burnout: 
[] Yes /  [x] No /  [] No Caregiver Present Anticipatory grief assessment:  
[x] Normal  / [] Maladaptive ESAS Anxiety: ESAS Depression:    
 
Cannot obtain due to patient factors REVIEW OF SYSTEMS:  
 
Positive and pertinent negative findings in ROS are noted above in HPI. The following systems were [] reviewed / [x] unable to be reviewed as noted in HPI Other findings are noted below. Systems: constitutional, ears/nose/mouth/throat, respiratory, gastrointestinal, genitourinary, musculoskeletal, integumentary, neurologic, psychiatric, endocrine. Positive findings noted below. Modified ESAS Completed by: provider Fatigue: 10 Drowsiness: 9 Pain: 0 Anorexia: 10 Dyspnea: 0 PHYSICAL EXAM:  
 
From RN flowsheet: 
Wt Readings from Last 3 Encounters:  
03/03/21 147 lb 11.3 oz (67 kg) 01/16/21 136 lb 1.6 oz (61.7 kg) 08/21/20 132 lb 9.6 oz (60.1 kg) Blood pressure (!) 140/74, pulse 93, temperature 98.1 °F (36.7 °C), resp. rate 18, height 5' 6\" (1.676 m), weight 147 lb 11.3 oz (67 kg), SpO2 98 %. Pain Scale 1: Adult Nonverbal Pain Scale Pain Intensity 1: 0 Pain Location 1: Generalized Pain Intervention(s) 1: Repositioned Last bowel movement, if known:  
 
Constitutional: temporal wasting, does not respond to me Respiratory: breathing not labored, symmetric HISTORY:  
 
Active Problems: 
  Sacral decubitus ulcer (2/27/2021) Past Medical History:  
Diagnosis Date  Hypertension  Seasonal allergic rhinitis  Spleen absent Past Surgical History:  
Procedure Laterality Date  TX ABDOMEN SURGERY PROC UNLISTED    
 spleenectomy Family History Problem Relation Age of Onset  Coronary Artery Disease Neg Hx History reviewed, no pertinent family history. Social History Tobacco Use  Smoking status: Never Smoker  Smokeless tobacco: Never Used Substance Use Topics  Alcohol use: No  
  Alcohol/week: 0.0 standard drinks Allergies Allergen Reactions  Aspirin Hives  Tramadol Seizures Current Facility-Administered Medications Medication Dose Route Frequency  morphine injection 1 mg  1 mg IntraVENous Q4H PRN  
 vancomycin (VANCOCIN) 1,000 mg in 0.9% sodium chloride 250 mL (VIAL-MATE)  1,000 mg IntraVENous Q36H  
 sodium hypochlorite (QUARTER STRENGTH DAKIN'S) 0.125% irrigation (bottle)   Topical DAILY  balsam peru-castor oiL (VENELEX) ointment   Topical BID  cefepime (MAXIPIME) 2 g in 0.9% sodium chloride (MBP/ADV) 100 mL MBP  2 g IntraVENous Q24H  Vancomycin dosing per pharmacy   Other Rx Dosing/Monitoring  dextrose 5% infusion  75 mL/hr IntraVENous CONTINUOUS  
 levETIRAcetam (KEPPRA) 500 mg in 0.9% sodium chloride 100 mL IVPB  500 mg IntraVENous DAILY  heparin (porcine) injection 5,000 Units  5,000 Units SubCUTAneous Q12H  
 sodium chloride (NS) flush 5-40 mL  5-40 mL IntraVENous Q8H  
 sodium chloride (NS) flush 5-40 mL  5-40 mL IntraVENous PRN  
 acetaminophen (TYLENOL) tablet 650 mg  650 mg Oral Q6H PRN Or  
 acetaminophen (TYLENOL) suppository 650 mg  650 mg Rectal Q6H PRN  
 
 
 
 LAB AND IMAGING FINDINGS:  
 
Lab Results Component Value Date/Time WBC 11.7 (H) 03/04/2021 03:37 AM  
 HGB 8.2 (L) 03/04/2021 03:37 AM  
 PLATELET 917 60/17/7969 03:37 AM  
 
Lab Results Component Value Date/Time  Sodium 148 (H) 03/04/2021 01:13 AM  
 Potassium 3.6 03/04/2021 01:13 AM  
 Chloride 120 (H) 03/04/2021 01:13 AM  
 CO2 22 03/04/2021 01:13 AM  
 BUN 19 03/04/2021 01:13 AM  
 Creatinine 0.46 (L) 03/04/2021 01:13 AM  
 Calcium 9.2 03/04/2021 01:13 AM  
 Magnesium 1.7 01/16/2021 04:44 AM  
 Phosphorus 4.8 (H) 01/11/2021 03:40 AM  
  
Lab Results Component Value Date/Time Alk. phosphatase 171 (H) 02/27/2021 08:44 PM  
 Protein, total 6.5 02/27/2021 08:44 PM  
 Albumin 1.2 (L) 02/27/2021 08:44 PM  
 Globulin 5.3 (H) 02/27/2021 08:44 PM  
 
Lab Results Component Value Date/Time INR 1.0 06/27/2016 05:22 PM  
 Prothrombin time 10.4 06/27/2016 05:22 PM  
 aPTT 24.0 06/27/2016 05:22 PM  
  
No results found for: IRON, FE, TIBC, IBCT, PSAT, FERR No results found for: PH, PCO2, PO2 No components found for: [place] No results found for: CPK, CKMB Total time: 50 min Counseling / coordination time, spent as noted above: 35 min  
> 50% counseling / coordination?: yes Prolonged service was provided for  []30 min   []75 min in face to face time in the presence of the patient, spent as noted above. Time Start:  
Time End:  
Note: this can only be billed with 87844 (initial) or 98116 (follow up). If multiple start / stop times, list each separately.

## 2021-03-04 NOTE — PROGRESS NOTES
Physician Progress Note Nell Argueta 
CSN #:                  [id number] :                       1935 ADMIT DATE:       2021 7:36 PM 
[address] [place] DATE: 
RESPONDING 
PROVIDER #:        Argentina Silva MD 
 
 
 
 
QUERY TEXT: 
 
Patient admitted with sepsis. Noted documentation of severe sepsis with shock in H&P and subsequent PNs. . In order to support the diagnosis of septic shock, please include additional clinical indicators in your documentation. Or please document if the diagnosis of septic shock has been ruled out after further study. The medical record reflects the following: 
Risk Factors: sepsis, stg 4 pressure ulcer Clinical Indicators: WBC 13.7-> 16.6-> 14.9 Lactate 1.6 RR 20 - 32, RA 
HR 74 - 89 BP 99/33, 91/37, 98/51, 95/45, 92/50 Treatment: no pressors required; 1L 0.9% NS; 1L Lactated Ringers; Vanc 1g IV Q 3d; Cefepim3 2g IV; 
 
 
Septic Shock = Refractory hypotension refractory to aggressive volume replacement and often requiring vasopressor therapy like dopamine  -OR-  Lactic Acidosis  [Lactic Acid > 4.0]. Thank you, Reinier Abad RN, BSN, [employer]  
[phone] Options provided: 
-- Septic shock present as evidenced by, Please document evidence. -- Septic shock was ruled out 
-- Other - I will add my own diagnosis -- Disagree - Not applicable / Not valid -- Disagree - Clinically unable to determine / Unknown 
-- Refer to Clinical Documentation Reviewer PROVIDER RESPONSE TEXT: 
 
Septic shock is present as evidenced by persistent hypotension Query created by: Jan Winchester on 3/1/2021 1:37 PM 
 
 
Electronically signed by:  Argentina Silva MD 3/4/2021 12:10 AM

## 2021-03-04 NOTE — PROGRESS NOTES
[facility] Adult  Hospitalist Group Hospitalist Progress Note Sonja Tran MD 
Answering service: [phone] from in house phone Date of Service:  3/4/2021 NAME:  Debra Astorga :  1935 MRN:  [id number] Admission Summary:  
Debra Astorga is a 82 y.o. female with hx HTN, seasonal allergic rhinitis, asplenia, and dementia who presents with worsening left hip wound, and sacral decubitus ulcer. She was on hospice, but rescinded hospice today. Interval history / Subjective:  
 patient awake but not following commands for me She does not appear to be distressed D/w CM and hospice. Patient is currently getting iv abx and fluids. Currently she is not comfort measures Will consult palliaitve to determine goals of care. Patient may qualify for IPH. hospice will wit for palliative recommendations /consult. Tried calling son and spouse unable to reach family members. Assessment & Plan: #Severe Sepsis with shock- resolved 
-BP low stable, lactic acid normal, no fevers 
-cont IV vanc, cefepime, 
-follow urine, and blood culture If family opts for comfort measures, will stop  
  
#Sacral Decub, left hip wound 
-continue abx 
-consulted gen surg , recommending hospice.  
-consulted wound care 
  
#Acute Renal Insuffieciency - resolved Creatinine now normal with IVF 
  
#Hypotensive 
-holding antihypertensives, cont IVF Hypernatremia- improving Due to poor PO intake 
correcting with IVFluids 
  
#Dementia w/ severe debility, adult failure to thrive 
-was on hospice, palliative care met with family and plans for patient to be discharged back onto hospice- COVID test for placement was +, asymptomatic Code status changed back to DNR May go to [facility], palliative consulted Code status:  DNR  
DVT prophylaxis: Heparin Care Plan discussed with: Patient/Family Anticipated Disposition: hospice facility Anticipated Discharge: 24 hours to 48 hours Hospital Problems  Date Reviewed: 1/10/2021 Codes Class Noted POA Sacral decubitus ulcer ICD-10-CM: L89.159 ICD-9-CM: 707.03, 707.20  2/27/2021 Unknown Review of Systems:  
Review of systems not obtained due to patient factors. Vital Signs:  
 Last 24hrs VS reviewed since prior progress note. Most recent are: 
Visit Vitals BP (!) 140/74 (BP 1 Location: Left upper arm, BP Patient Position: At rest) Pulse 93 Temp 98.1 °F (36.7 °C) Resp 18 Ht 5' 6\" (1.676 m) Wt 67 kg (147 lb 11.3 oz) SpO2 98% BMI 23.84 kg/m² Intake/Output Summary (Last 24 hours) at 3/4/2021 1536 Last data filed at 3/4/2021 2889 Gross per 24 hour Intake  Output 950 ml Net -950 ml Physical Examination:  
 
I had a face to face encounter with this patient and independently examined them on 3/4/2021 as outlined below: 
 
     
Constitutional:  No acute distress, awake, cachetic appearing ENT:  Oral mucosa dry. Resp:  CTA bilaterally. No wheezing/rhonchi/rales. CV:  Regular rhythm, normal rate, no murmurs, GI:  Soft, non distended, non tender. n   
 Musculoskeletal: Diffuse muscle wasting and some contractures Neurologic:  not following any commands for me despite being awake and alert Skin= 
 
 
 
 
  
  
  
 
Data Review:  
 Review and/or order of clinical lab test 
Review and/or order of tests in the radiology section of CPT Review and/or order of tests in the medicine section of CPT Labs:  
 
Recent Labs 03/04/21 
6619 03/03/21 
8354 WBC 11.7* 13.1* HGB 8.2* 8.6* HCT 25.9* 27.3*  
 360 Recent Labs 03/04/21 
0113 03/03/21 
6095 * 150*  
K 3.6 3.6 * 121* CO2 22 23 BUN 19 25* CREA 0.46* 0.56 GLU 95 104* CA 9.2 9.6 No results for input(s): ALT, AP, TBIL, TBILI, TP, ALB, GLOB, GGT, AML, LPSE in the last 72 hours. Statement Selected No lab exists for component: SGOT, GPT, AMYP, HLPSE No results for input(s): INR, PTP, APTT, INREXT, INREXT in the last 72 hours. No results for input(s): FE, TIBC, PSAT, FERR in the last 72 hours. No results found for: FOL, RBCF No results for input(s): PH, PCO2, PO2 in the last 72 hours. No results for input(s): CPK, CKNDX, TROIQ in the last 72 hours. No lab exists for component: CPKMB No results found for: CHOL, CHOLX, CHLST, CHOLV, HDL, HDLP, LDL, LDLC, DLDLP, TGLX, TRIGL, TRIGP, CHHD, CHHDX Lab Results Component Value Date/Time Glucose (POC) 120 (H) 01/11/2021 03:47 PM  
 
Lab Results Component Value Date/Time Color YELLOW/STRAW 02/27/2021 08:44 PM  
 Appearance CLEAR 02/27/2021 08:44 PM  
 Specific gravity 1.020 01/11/2021 05:29 PM  
 pH (UA) 5.0 02/27/2021 08:44 PM  
 Protein Negative 02/27/2021 08:44 PM  
 Glucose Negative 02/27/2021 08:44 PM  
 Ketone Negative 02/27/2021 08:44 PM  
 Bilirubin Negative 02/27/2021 08:44 PM  
 Urobilinogen 0.2 02/27/2021 08:44 PM  
 Nitrites Negative 02/27/2021 08:44 PM  
 Leukocyte Esterase TRACE (A) 02/27/2021 08:44 PM  
 Epithelial cells FEW 02/27/2021 08:44 PM  
 Bacteria Negative 02/27/2021 08:44 PM  
 WBC 0-4 02/27/2021 08:44 PM  
 RBC 0-5 02/27/2021 08:44 PM  
 
 
 
Medications Reviewed:  
 
Current Facility-Administered Medications Medication Dose Route Frequency  morphine injection 1 mg  1 mg IntraVENous Q4H PRN  
 vancomycin (VANCOCIN) 1,000 mg in 0.9% sodium chloride 250 mL (VIAL-MATE)  1,000 mg IntraVENous Q36H  
 sodium hypochlorite (QUARTER STRENGTH DAKIN'S) 0.125% irrigation (bottle)   Topical DAILY  balsam peru-castor oiL (VENELEX) ointment   Topical BID  cefepime (MAXIPIME) 2 g in 0.9% sodium chloride (MBP/ADV) 100 mL MBP  2 g IntraVENous Q24H  Vancomycin dosing per pharmacy   Other Rx Dosing/Monitoring  dextrose 5% infusion  75 mL/hr IntraVENous CONTINUOUS  
  levETIRAcetam (KEPPRA) 500 mg in 0.9% sodium chloride 100 mL IVPB  500 mg IntraVENous DAILY  heparin (porcine) injection 5,000 Units  5,000 Units SubCUTAneous Q12H  
 sodium chloride (NS) flush 5-40 mL  5-40 mL IntraVENous Q8H  
 sodium chloride (NS) flush 5-40 mL  5-40 mL IntraVENous PRN  
 acetaminophen (TYLENOL) tablet 650 mg  650 mg Oral Q6H PRN Or  
 acetaminophen (TYLENOL) suppository 650 mg  650 mg Rectal Q6H PRN  
 
______________________________________________________________________ EXPECTED LENGTH OF STAY: 4d 19h ACTUAL LENGTH OF STAY:          5 Vladimir Galindo MD

## 2021-03-04 NOTE — PROGRESS NOTES
Physician Progress Note PATIENTElissa Cisneros 
CSN #:                  [id number] :                       1935 ADMIT DATE:       2021 7:36 PM 
[address] [place] DATE: 
RESPONDING 
PROVIDER #:        PINKY Gonzalez MD 
 
 
 
 
QUERY TEXT: 
 
Patient admitted with sepsis. Per wound care RN, noted to also have multiple pressure ulcers in various stages. If possible, please document in progress notes and discharge summary the stages of the pressure ulcers: 
 
Left hip, Pressure injury unstageable:  3.8 x 8 x 0 cm; 100% dry eschar. Right heel, pressure Injury stage 1: 1 x 3 x 0 cm Right breast, partial thickness wound:  5 x 1 x 0.1 cm; 50% red 50% yellow; no exudate, odor or erythema. Left breast, partial thickness wound:  1 x 0.5 x 0.1 cm; 100% pink; no exudate, odor or erythema. Right elbow, partial thickness wound:  2.5 x 2 x 0.1 cm; 100% moist red; no odor or erythema Left inner thigh, partial thickness wound:  1 x 0.5 x 0.1 cm; 80% pink 20% yellow. Right low leg, partial thickness wound:  5 x 2 x 0.1 cm; 100% moist red; no odor or erythema. Right breast, DTI; 3 x 0.5 x 0 cm; 100% non blanching purple. Left breast, DTI:  4 x 0.5 x 0 cm; 100% non blanching purple. Right side of back, DTI:  Clustered along field 8 x 2 x 0 cm; non blanching purple. Left medial foot, DTI:  2 x 1.2 x 0 cm; non blanching purple Left lateral 5th toe, DTI:  1 x 2 x 0 cm; 100% non blanching purple. Left lateral foot, DTI: 1 x 1 x 0 cm; non blanching purple Right ankle, DTI; 2.2 x 2 x 0 cm; non blanching purple. Right lateral foot, DTI:  1.5 x 0.5 x 0 cm; non blanching purple. Right lateral foot, DTI:  2 x 1.5 x 0 cm; non blanching purple. The medical record reflects the following: 
Risk Factors: 86yo; bedridden; decreased bed mobility Clinical Indicators: 
 
see above Treatment: Wound care RN; foam mattress; turn/reposition approximately every 2 hours and offload heels; Sacrum:  Daily cleanse with Dakins; apply Silver Alginate (Opticel Ag); cover with dry dressing; Left hip:  Protect with foam; Right breast, Left breast, left ankle, bilateral heels, left medial foot, left lateral foot and 5th toe, left knee, right knee, right ankle, right lateral foot and right back:  Apply Venelex BID; Right elbow and right low leg:  Daily cleanse with saline; apply Xeroform; cover with dry dressing; Left inner thigh:  Apply Zinc cream BID. Stage 1:  Non-blanchable erythema of intact skin Stage 2:  Abrasion, Blister, Partial-thickness skin loss, with exposed dermis Stage 3:  Full-thickness skin loss with damage or necrosis of subcutaneous tissue Stage 4:  Full-thickness skin & soft tissue loss through to underlying muscle, tendon or bone Unstageable: Obscured full-thickness skin & tissue loss Thank you, Lupillo Gonzales RN, BSN, [facility] Clinical  
[phone] Options provided: 
-- Stage 1 pressure ulcer of right heel, multiple partial thickness (Stage 2) pressure ulcers to breasts, right elbow, left thigh, and RLE; and multiple DTIs to breasts, right back, bilateral feet and right ankle all present on admission -- Stage 1 pressure ulcer of right heel, multiple partial thickness (Stage 2) pressure ulcers to breasts, right elbow, left thigh, and RLE; and multiple DTIs to breasts, right back, bilateral feet and right ankle all not present on admission 
-- Other - I will add my own diagnosis -- Disagree - Not applicable / Not valid -- Disagree - Clinically unable to determine / Unknown 
-- Refer to Clinical Documentation Reviewer PROVIDER RESPONSE TEXT: 
 
 This patient has a stage 1 pressure ulcer of the right heel, multiple partial thickness (Stage 2) pressure ulcers to breasts, right elbow, left thigh, and RLE; and multiple DTIs to breasts, right back, bilateral feet and right ankle which were all present on admission.  
 
Query created by: Josie Razo on 3/4/2021 8:37 AM 
 
 
Electronically signed by:  Anthony Olivarez MD 3/4/2021 5:28 PM

## 2021-03-04 NOTE — PROGRESS NOTES
Bedside and Verbal shift change report given to Francisca Reese RN (oncoming nurse) by OLIVIA Johnson RN (offgoing nurse). Report included the following information SBAR, Kardex, Intake/Output, MAR and Recent Results.

## 2021-03-04 NOTE — PROGRESS NOTES
[address] [facility] Adult  Hospitalist Group Hospitalist Progress Note Morris Moreno MD 
Answering service: [phone] from in house phone Date of Service:  3/3/2021 NAME:  Sharon Hand :  1935 MRN:  [id number] Admission Summary:  
Sharon Hand is a 82 y.o. female with hx HTN, seasonal allergic rhinitis, asplenia, and dementia who presents with worsening left hip wound, and sacral decubitus ulcer. She was on hospice, but rescinded hospice today. Interval history / Subjective:  
 patient awake but not following commands for me She does not appear to be distressed She did say yes when the nurse asked her if she was in pain Assessment & Plan: #Severe Sepsis with shock- resolved 
-BP low stable, lactic acid normal, no fevers 
-cont IV vanc, cefepime, 
-follow urine, and blood culture 
  #Sacral Decub, left hip wound 
-continue abx 
-consulted gen surg  
-consulted wound care 
  
#Acute Renal Insuffieciency - resolved Creatinine now normal with IVF 
  
#Hypotensive 
-holding antihypertensives, cont IVF Hypernatremia-  
Due to poor PO intake 
correcting with IVFluids 
  
#Dementia w/ severe debility, adult failure to thrive 
-was on hospice, palliative care met with family and plans for patient to be discharged back onto hospice- COVID test for placement was +, asymptomatic Code status changed back to DNR Patient appropriate for hospice and do not recommend any surgical intervention or prolonged IV antibiotics, awaiting placement Code status:  DNR  
DVT prophylaxis: Heparin Care Plan discussed with: Patient/Family Anticipated Disposition: hospice facility Anticipated Discharge: 24 hours to 48 hours Hospital Problems  Date Reviewed: 1/10/2021 Codes Class Noted POA  Sacral decubitus ulcer ICD-10-CM: L89.159 
 ICD-9-CM: 707.03, 707.20  2/27/2021 Unknown Review of Systems:  
Review of systems not obtained due to patient factors. Vital Signs:  
 Last 24hrs VS reviewed since prior progress note. Most recent are: 
Visit Vitals BP (!) 118/50 (BP 1 Location: Left upper arm, BP Patient Position: At rest) Pulse 95 Temp 98.3 °F (36.8 °C) Resp 18 Ht 5' 6\" (1.676 m) Wt 67 kg (147 lb 11.3 oz) SpO2 96% BMI 23.84 kg/m² No intake or output data in the 24 hours ending 03/03/21 2208 Physical Examination:  
 
I had a face to face encounter with this patient and independently examined them on 3/3/2021 as outlined below: 
 
     
Constitutional:  No acute distress, awake, cachetic appearing ENT:  Oral mucosa dry. Resp:  CTA bilaterally. No wheezing/rhonchi/rales. CV:  Regular rhythm, normal rate, no murmurs, GI:  Soft, non distended, non tender. n   
 Musculoskeletal: Diffuse muscle wasting and some contractures Neurologic:  not following any commands for me despite being awake and alert Skin= 
 
 
 
 
  
  
  
 
Data Review:  
 Review and/or order of clinical lab test 
Review and/or order of tests in the radiology section of CPT Review and/or order of tests in the medicine section of CPT Labs:  
 
Recent Labs 03/03/21 
1870 03/01/21 
6010 WBC 13.1* 14.9* HGB 8.6* 8.4* HCT 27.3* 26.3*  
 304 Recent Labs 03/03/21 
6739 03/01/21 
0291 * 152* K 3.6 3.6 * 121* CO2 23 24 BUN 25* 56* CREA 0.56 0.73 * 114* CA 9.6 9.2 No results for input(s): ALT, AP, TBIL, TBILI, TP, ALB, GLOB, GGT, AML, LPSE in the last 72 hours. No lab exists for component: SGOT, GPT, AMYP, HLPSE No results for input(s): INR, PTP, APTT, INREXT, INREXT in the last 72 hours. No results for input(s): FE, TIBC, PSAT, FERR in the last 72 hours. No results found for: FOL, RBCF No results for input(s): PH, PCO2, PO2 in the last 72 hours. No results for input(s): CPK, CKNDX, TROIQ in the last 72 hours. 
 
No lab exists for component: CPKMB 
No results found for: CHOL, CHOLX, CHLST, CHOLV, HDL, HDLP, LDL, LDLC, DLDLP, TGLX, TRIGL, TRIGP, CHHD, CHHDX 
Lab Results  
Component Value Date/Time  
 Glucose (POC) 120 (H) 01/11/2021 03:47 PM  
 
Lab Results  
Component Value Date/Time  
 Color YELLOW/STRAW 02/27/2021 08:44 PM  
 Appearance CLEAR 02/27/2021 08:44 PM  
 Specific gravity 1.020 01/11/2021 05:29 PM  
 pH (UA) 5.0 02/27/2021 08:44 PM  
 Protein Negative 02/27/2021 08:44 PM  
 Glucose Negative 02/27/2021 08:44 PM  
 Ketone Negative 02/27/2021 08:44 PM  
 Bilirubin Negative 02/27/2021 08:44 PM  
 Urobilinogen 0.2 02/27/2021 08:44 PM  
 Nitrites Negative 02/27/2021 08:44 PM  
 Leukocyte Esterase TRACE (A) 02/27/2021 08:44 PM  
 Epithelial cells FEW 02/27/2021 08:44 PM  
 Bacteria Negative 02/27/2021 08:44 PM  
 WBC 0-4 02/27/2021 08:44 PM  
 RBC 0-5 02/27/2021 08:44 PM  
 
 
 
Medications Reviewed:  
 
Current Facility-Administered Medications  
Medication Dose Route Frequency  
• morphine injection 1 mg  1 mg IntraVENous Q4H PRN  
• vancomycin (VANCOCIN) 1,000 mg in 0.9% sodium chloride 250 mL (VIAL-MATE)  1,000 mg IntraVENous Q36H  
• sodium hypochlorite (QUARTER STRENGTH DAKIN'S) 0.125% irrigation (bottle)   Topical DAILY  
• balsam peru-castor oiL (VENELEX) ointment   Topical BID  
• cefepime (MAXIPIME) 2 g in 0.9% sodium chloride (MBP/ADV) 100 mL MBP  2 g IntraVENous Q24H  
• Vancomycin dosing per pharmacy   Other Rx Dosing/Monitoring  
• dextrose 5% infusion  75 mL/hr IntraVENous CONTINUOUS  
• levETIRAcetam (KEPPRA) 500 mg in 0.9% sodium chloride 100 mL IVPB  500 mg IntraVENous DAILY  
• heparin (porcine) injection 5,000 Units  5,000 Units SubCUTAneous Q12H  
• sodium chloride (NS) flush 5-40 mL  5-40 mL IntraVENous Q8H  
• sodium chloride (NS) flush 5-40 mL  5-40 mL IntraVENous PRN  
  acetaminophen (TYLENOL) tablet 650 mg  650 mg Oral Q6H PRN Or  
 acetaminophen (TYLENOL) suppository 650 mg  650 mg Rectal Q6H PRN  
 
______________________________________________________________________ EXPECTED LENGTH OF STAY: 4d 19h ACTUAL LENGTH OF STAY:          4 Surya Calvert MD

## 2021-03-04 NOTE — PROGRESS NOTES
Problem: Breathing Pattern - Ineffective Goal: *Absence of hypoxia Outcome: Progressing Towards Goal 
Goal: *Use of effective breathing techniques Outcome: Progressing Towards Goal 
Goal: *PALLIATIVE CARE:  Alleviation of Dyspnea Outcome: Progressing Towards Goal 
  
Problem: Patient Education: Go to Patient Education Activity Goal: Patient/Family Education Outcome: Progressing Towards Goal

## 2021-03-04 NOTE — PROGRESS NOTES
Spiritual Care Assessment/Progress Note [address] 
 
 
NAME: Ginger Chaparro      MRN: [id number] AGE: 82 y.o. SEX: female Judaism Affiliation: [facility]  
Language: [place] 3/4/2021     Total Time (in minutes): 7 Spiritual Assessment begun in [facility] SUR[facility] through conversation with: 
  
    []Patient        [] Family    [] Friend(s) Reason for Consult: Palliative Care, Initial/Spiritual Assessment Spiritual beliefs: (Please include comment if needed) 
   [] Identifies with a abdi tradition:     
   [] Supported by a abdi community:        
   [] Claims no spiritual orientation:       
   [] Seeking spiritual identity:            
   [] Adheres to an individual form of spirituality:       
   [x] Not able to assess:                   
 
    
Identified resources for coping:  
   [] Prayer                           
   [] Music                  [] Guided Imagery 
   [] Family/friends                 [] Pet visits [] Devotional reading                         [x] Unknown 
   [] Other:                                         
 
 
Interventions offered during this visit: (See comments for more details) Patient Interventions: Initial visit Plan of Care: 
 
 [] Support spiritual and/or cultural needs  
 [] Support AMD and/or advance care planning process    
 [] Support grieving process 
 [] Coordinate Rites and/or Rituals  
 [] Coordination with community clergy [] No spiritual needs identified at this time 
 [] Detailed Plan of Care below (See Comments)  [] Make referral to Music Therapy 
[] Make referral to Pet Therapy    
[] Make referral to Addiction services 
[] Make referral to [facility] 
[] Make referral to Spiritual Care Partner 
[] No future visits requested       
[x] Follow up upon further referrals Attempted to visit pt for initial spiritual assessment. Unable to complete assessment at this time due to COVID-19 isolation precautions. Chaplain Medley MDiv, MS, [facility] 
[phone] [phone])

## 2021-03-05 NOTE — PROGRESS NOTES
Hospice attending note: 
 
Patient assessed this afternoon. Does not show signs of acute discomfort. Eyes open and nods \"yes\" to questions no matter what question she is asked. No comfort medications required so far today. She does not meet inpatient hospice criteria at this time, but we will continue to monitor daily for changes in her status. Thank you for asking our team to participate in the care of Ms. Royal.

## 2021-03-05 NOTE — PROGRESS NOTES
[address] [facility] Adult  Hospitalist Group Hospitalist Progress Note Adriane Bee MD 
Answering service: [phone] from in house phone Date of Service:  3/5/2021 NAME:  Malu Carias :  1935 MRN:  [id number] Admission Summary:  
Malu Carias is a 82 y.o. female with hx HTN, seasonal allergic rhinitis, asplenia, and dementia who presents with worsening left hip wound, and sacral decubitus ulcer. She was on hospice, but rescinded hospice today. Interval history / Subjective:  
 patient awake but not following commands for me She does not appear to be distressed Talked to patient son and spouse in detail . Spouse defer all decision to son. Son and spouse is ok with comfort measures. Answered all questions in detail. Had a lengthy discussion with son. Sent perfect serve message to Hospice/palliaitve. Also updated CM. Both spouse and son in agreement with plan Waiting for inpatient hospcie acceptance Assessment & Plan: #Severe Sepsis with shock- resolved 
-BP low stable, lactic acid normal, no fevers 
-will stop abx today as patient is now comfort measures. Family opted for comfort measures  
  #Sacral Decub, left hip wound 
-continue abx 
-consulted gen surg , recommending hospice. -now on comfort measures  
  #Acute Renal Insuffieciency - resolved 
  #Hypotensive, improved S/p fluids Hypernatremia- improved #Dementia w/ severe debility, adult failure to thrive 
-was on hospice, palliative care met with family and plans for patient to be discharged back onto hospice- COVID test for placement was +, asymptomatic Code status changed back to DNR Waiting for acceptance to inpatient hospice. Code status:  DNR  
DVT prophylaxis: Heparin Care Plan discussed with: Patient/Family Anticipated Disposition: hospice facility Anticipated Discharge: 24 hours to 48 hours Hospital Problems  Date Reviewed: 1/10/2021 Codes Class Noted POA Sacral decubitus ulcer ICD-10-CM: L89.159 ICD-9-CM: 707.03, 707.20  2/27/2021 Unknown Review of Systems:  
Review of systems not obtained due to patient factors. Vital Signs:  
 Last 24hrs VS reviewed since prior progress note. Most recent are: 
Visit Vitals BP (!) 99/52 (BP 1 Location: Left upper arm, BP Patient Position: At rest) Pulse 89 Temp 99.4 °F (37.4 °C) Resp 16 Ht 5' 6\" (1.676 m) Wt 67 kg (147 lb 11.3 oz) SpO2 98% BMI 23.84 kg/m² Intake/Output Summary (Last 24 hours) at 3/5/2021 1328 Last data filed at 3/5/2021 4758 Gross per 24 hour Intake  Output 300 ml Net -300 ml Physical Examination:  
 
I had a face to face encounter with this patient and independently examined them on 3/5/2021 as outlined below: 
 
     
Constitutional:  No acute distress, awake, cachetic appearing ENT:  Oral mucosa dry. Resp:  CTA bilaterally. No wheezing/rhonchi/rales. CV:  Regular rhythm, normal rate, no murmurs, GI:  Soft, non distended, non tender. n   
 Musculoskeletal: Diffuse muscle wasting and some contractures Neurologic:  not following any commands for me despite being awake and alert Skin= 
 
 
 
 
  
  
  
 
Data Review:  
 Review and/or order of clinical lab test 
Review and/or order of tests in the radiology section of CPT Review and/or order of tests in the medicine section of CPT Labs:  
 
Recent Labs 03/05/21 
1070 03/04/21 
0742 WBC 12.3* 11.7* HGB 7.7* 8.2* HCT 24.0* 25.9*  
 350 Recent Labs 03/05/21 
6477 03/04/21 
0113 03/03/21 
5352 * 148* 150*  
K 3.4* 3.6 3.6 * 120* 121* CO2 23 22 23 BUN 15 19 25* CREA 0.50* 0.46* 0.56 * 95 104* CA 9.2 9.2 9.6 No results for input(s): ALT, AP, TBIL, TBILI, TP, ALB, GLOB, GGT, AML, LPSE in the last 72 hours. No lab exists for component: SGOT, GPT, AMYP, HLPSE No results for input(s): INR, PTP, APTT, INREXT, INREXT in the last 72 hours. No results for input(s): FE, TIBC, PSAT, FERR in the last 72 hours. No results found for: FOL, RBCF No results for input(s): PH, PCO2, PO2 in the last 72 hours. No results for input(s): CPK, CKNDX, TROIQ in the last 72 hours. No lab exists for component: CPKMB No results found for: CHOL, CHOLX, CHLST, CHOLV, HDL, HDLP, LDL, LDLC, DLDLP, TGLX, TRIGL, TRIGP, CHHD, CHHDX Lab Results Component Value Date/Time Glucose (POC) 120 (H) 01/11/2021 03:47 PM  
 
Lab Results Component Value Date/Time Color YELLOW/STRAW 02/27/2021 08:44 PM  
 Appearance CLEAR 02/27/2021 08:44 PM  
 Specific gravity 1.020 01/11/2021 05:29 PM  
 pH (UA) 5.0 02/27/2021 08:44 PM  
 Protein Negative 02/27/2021 08:44 PM  
 Glucose Negative 02/27/2021 08:44 PM  
 Ketone Negative 02/27/2021 08:44 PM  
 Bilirubin Negative 02/27/2021 08:44 PM  
 Urobilinogen 0.2 02/27/2021 08:44 PM  
 Nitrites Negative 02/27/2021 08:44 PM  
 Leukocyte Esterase TRACE (A) 02/27/2021 08:44 PM  
 Epithelial cells FEW 02/27/2021 08:44 PM  
 Bacteria Negative 02/27/2021 08:44 PM  
 WBC 0-4 02/27/2021 08:44 PM  
 RBC 0-5 02/27/2021 08:44 PM  
 
 
 
Medications Reviewed:  
 
Current Facility-Administered Medications Medication Dose Route Frequency  [START ON 3/6/2021] Vancomycin, Trough - Please draw IMMEDIATELY PRIOR to starting 2200 dose on Saturday, 3/06. Other ONCE  
 morphine injection 1 mg  1 mg IntraVENous Q4H PRN  
 vancomycin (VANCOCIN) 1,000 mg in 0.9% sodium chloride 250 mL (VIAL-MATE)  1,000 mg IntraVENous Q36H  
 sodium hypochlorite (QUARTER STRENGTH DAKIN'S) 0.125% irrigation (bottle)   Topical DAILY  balsam peru-castor oiL (VENELEX) ointment   Topical BID  cefepime (MAXIPIME) 2 g in 0.9% sodium chloride (MBP/ADV) 100 mL MBP  2 g IntraVENous Q24H  Vancomycin dosing per pharmacy   Other Rx Dosing/Monitoring  dextrose 5% infusion  75 mL/hr IntraVENous CONTINUOUS  
 levETIRAcetam (KEPPRA) 500 mg in 0.9% sodium chloride 100 mL IVPB  500 mg IntraVENous DAILY  heparin (porcine) injection 5,000 Units  5,000 Units SubCUTAneous Q12H  
 sodium chloride (NS) flush 5-40 mL  5-40 mL IntraVENous Q8H  
 sodium chloride (NS) flush 5-40 mL  5-40 mL IntraVENous PRN  
 acetaminophen (TYLENOL) tablet 650 mg  650 mg Oral Q6H PRN Or  
 acetaminophen (TYLENOL) suppository 650 mg  650 mg Rectal Q6H PRN  
 
______________________________________________________________________ EXPECTED LENGTH OF STAY: 4d 19h ACTUAL LENGTH OF STAY:          6 Yamel Casillas MD

## 2021-03-05 NOTE — PROGRESS NOTES
Bedside and Verbal shift change report given to Dick Rueda (oncoming nurse) by [employer] (offgoing nurse). Report included the following information SBAR, Kardex and MAR.

## 2021-03-05 NOTE — PROGRESS NOTES
ROSALINA: 1. RUR-13% 2. Admitted from home, transitioning to comfort care. 3. [facility] following.  
 
 
Sherlyn Armijo [facility]

## 2021-03-06 NOTE — PROGRESS NOTES
[facility] Adult  Hospitalist Group Hospitalist Progress Note Maty Vela MD 
Answering service: [phone] from in house phone Date of Service:  3/6/2021 NAME:  Tricia Turner :  1935 MRN:  [id number] Admission Summary:  
Tricia Turner is a 82 y.o. female with hx HTN, seasonal allergic rhinitis, asplenia, and dementia who presents with worsening left hip wound, and sacral decubitus ulcer. She was on hospice, but rescinded hospice today. Interval history / Subjective:  
 patient awake but not following commands for me She does not appear to be distressed Talked to patient son and spouse in detail . Spouse defer all decision to son. Son and spouse is ok with comfort measures. Answered all questions in detail. Had a lengthy discussion with son. Sent perfect serve message to Hospice/palliaitve. Also updated CM. Both spouse and son in agreement with plan Waiting for inpatient hospcie acceptance As per most recent documentation. She does not meet inpatient hospcie criteria yet. Patient barely opens her eyes. Does not follow any command. Hospice following Assessment & Plan: #Severe Sepsis with shock- resolved 
-BP low stable, lactic acid normal, no fevers 
-will stop abx today as patient is now comfort measures. Family opted for comfort measures  
  #Sacral Decub, left hip wound 
-abx stopped  
-consulted gen surg , recommending hospice. -now on comfort measures  
  #Acute Renal Insuffieciency - resolved 
  #Hypotensive, improved S/p fluids Hypernatremia- improved #Dementia w/ severe debility, adult failure to thrive 
-was on hospice, palliative care met with family and plans for patient to be discharged back onto hospice- COVID test for placement was +, asymptomatic Code status changed back to DNR Waiting for acceptance to inpatient hospice. Code status:  DNR  
DVT prophylaxis: Heparin Care Plan discussed with: Patient/Family Anticipated Disposition: hospice facility Anticipated Discharge: 24 hours to 48 hours Hospital Problems  Date Reviewed: 1/10/2021 Codes Class Noted POA Sacral decubitus ulcer ICD-10-CM: L89.159 ICD-9-CM: 707.03, 707.20  2/27/2021 Unknown Review of Systems:  
Review of systems not obtained due to patient factors. Vital Signs:  
 Last 24hrs VS reviewed since prior progress note. Most recent are: 
Visit Vitals /70 (BP 1 Location: Right lower arm, BP Patient Position: At rest) Pulse (!) 107 Temp 96.9 °F (36.1 °C) Resp 16 Ht 5' 6\" (1.676 m) Wt 67 kg (147 lb 11.3 oz) SpO2 98% BMI 23.84 kg/m² Intake/Output Summary (Last 24 hours) at 3/6/2021 1322 Last data filed at 3/6/2021 4672 Gross per 24 hour Intake  Output 650 ml Net -650 ml Physical Examination:  
 
I had a face to face encounter with this patient and independently examined them on 3/6/2021 as outlined below: 
 
     
Constitutional:  No acute distress, cachetic appearing, barely opens her eyes, does not follow commands Resp:  CTA bilaterally. No wheezing/rhonchi/rales. CV:  Regular rhythm, normal rate, no murmurs, GI:  Soft, non distended, non tender. n   
 Musculoskeletal: Diffuse muscle wasting and some contractures Neurologic:  not following any commands for me despite being awake and alert Skin= 
 
 
 
 
  
  
  
 
Data Review:  
 Review and/or order of clinical lab test 
Review and/or order of tests in the radiology section of CPT Review and/or order of tests in the medicine section of CPT Labs:  
 
Recent Labs 03/05/21 
7936 03/04/21 
2634 WBC 12.3* 11.7* HGB 7.7* 8.2* HCT 24.0* 25.9*  
 350 Recent Labs 03/05/21 
4183 03/04/21 
0113 * 148* K 3.4* 3.6 * 120* CO2 23 22 BUN 15 19 CREA 0.50* 0.46* * 95  
CA 9.2 9.2 No results for input(s): ALT, AP, TBIL, TBILI, TP, ALB, GLOB, GGT, AML, LPSE in the last 72 hours. No lab exists for component: SGOT, GPT, AMYP, HLPSE No results for input(s): INR, PTP, APTT, INREXT, INREXT in the last 72 hours. No results for input(s): FE, TIBC, PSAT, FERR in the last 72 hours. No results found for: FOL, RBCF No results for input(s): PH, PCO2, PO2 in the last 72 hours. No results for input(s): CPK, CKNDX, TROIQ in the last 72 hours. No lab exists for component: CPKMB No results found for: CHOL, CHOLX, CHLST, CHOLV, HDL, HDLP, LDL, LDLC, DLDLP, TGLX, TRIGL, TRIGP, CHHD, CHHDX Lab Results Component Value Date/Time Glucose (POC) 120 (H) 01/11/2021 03:47 PM  
 
Lab Results Component Value Date/Time Color YELLOW/STRAW 02/27/2021 08:44 PM  
 Appearance CLEAR 02/27/2021 08:44 PM  
 Specific gravity 1.020 01/11/2021 05:29 PM  
 pH (UA) 5.0 02/27/2021 08:44 PM  
 Protein Negative 02/27/2021 08:44 PM  
 Glucose Negative 02/27/2021 08:44 PM  
 Ketone Negative 02/27/2021 08:44 PM  
 Bilirubin Negative 02/27/2021 08:44 PM  
 Urobilinogen 0.2 02/27/2021 08:44 PM  
 Nitrites Negative 02/27/2021 08:44 PM  
 Leukocyte Esterase TRACE (A) 02/27/2021 08:44 PM  
 Epithelial cells FEW 02/27/2021 08:44 PM  
 Bacteria Negative 02/27/2021 08:44 PM  
 WBC 0-4 02/27/2021 08:44 PM  
 RBC 0-5 02/27/2021 08:44 PM  
 
 
 
Medications Reviewed:  
 
Current Facility-Administered Medications Medication Dose Route Frequency  morphine injection 1 mg  1 mg IntraVENous Q4H PRN  
 sodium hypochlorite (QUARTER STRENGTH DAKIN'S) 0.125% irrigation (bottle)   Topical DAILY  balsam peru-castor oiL (VENELEX) ointment   Topical BID  dextrose 5% infusion  75 mL/hr IntraVENous CONTINUOUS  
 levETIRAcetam (KEPPRA) 500 mg in 0.9% sodium chloride 100 mL IVPB  500 mg IntraVENous DAILY  sodium chloride (NS) flush 5-40 mL  5-40 mL IntraVENous Q8H  
 sodium chloride (NS) flush 5-40 mL  5-40 mL IntraVENous PRN  
 acetaminophen (TYLENOL) tablet 650 mg  650 mg Oral Q6H PRN Or  
 acetaminophen (TYLENOL) suppository 650 mg  650 mg Rectal Q6H PRN  
 
______________________________________________________________________ EXPECTED LENGTH OF STAY: 4d 19h ACTUAL LENGTH OF STAY:          7 Stephon Plata MD

## 2021-03-06 NOTE — PROGRESS NOTES
Bedside and Verbal shift change report given to Sary Figueroa RN (oncoming nurse) by Emani Whitaker RN (offgoing nurse). Report included the following information SBAR, Kardex, Intake/Output, MAR, Recent Results and Med Rec Status.

## 2021-03-07 NOTE — PROGRESS NOTES
[address] [facility] Adult  Hospitalist Group Hospitalist Progress Note Marifer Muller MD 
Answering service: [phone] from in house phone Date of Service:  3/7/2021 NAME:  Beatrice Whitfield :  1935 MRN:  [id number] Admission Summary:  
Beatrice Whitfield is a 82 y.o. female with hx HTN, seasonal allergic rhinitis, asplenia, and dementia who presents with worsening left hip wound, and sacral decubitus ulcer. She was on hospice, but rescinded hospice today. Interval history / Subjective:  
 patient awake but not following commands for me She does not appear to be distressed Talked to patient son and spouse in detail . Spouse defer all decision to son. Son and spouse is ok with comfort measures. Answered all questions in detail. Had a lengthy discussion with son. Sent perfect serve message to Hospice/palliaitve. Also updated CM. Both spouse and son in agreement with plan Waiting for inpatient hospcie acceptance As per most recent documentation. She does not meet inpatient hospcie criteria yet. Patient barely opens her eyes. Does not follow any command. Hospice following . At this time mancera snot meet criteria of inpatient hospice Waiting  For placement Assessment & Plan: #Severe Sepsis with shock- resolved 
-BP low stable, lactic acid normal, no fevers 
-will stop abx today as patient is now comfort measures. Family opted for comfort measures  
  #Sacral Decub, left hip wound 
-abx stopped  
-consulted gen surg , recommending hospice. -now on comfort measures  
  #Acute Renal Insuffieciency - resolved 
  #Hypotensive, improved Hypernatremia- improved #Dementia w/ severe debility, adult failure to thrive 
-was on hospice, palliative care met with family and plans for patient to be discharged back onto hospice- COVID test for placement was +, asymptomatic Code status changed back to DNR Code status:  DNR  
DVT prophylaxis: Heparin Care Plan discussed with: Patient/Family Anticipated Disposition: hospice facility Anticipated Discharge: 24 hours to 48 hours Hospital Problems  Date Reviewed: 1/10/2021 Codes Class Noted POA Sacral decubitus ulcer ICD-10-CM: L89.159 ICD-9-CM: 707.03, 707.20  2/27/2021 Unknown Review of Systems:  
Review of systems not obtained due to patient factors. Vital Signs:  
 Last 24hrs VS reviewed since prior progress note. Most recent are: 
Visit Vitals /69 (BP 1 Location: Right lower arm, BP Patient Position: At rest) Pulse 100 Temp 98.2 °F (36.8 °C) Resp 16 Ht 5' 6\" (1.676 m) Wt 67 kg (147 lb 11.3 oz) SpO2 97% BMI 23.84 kg/m² Intake/Output Summary (Last 24 hours) at 3/7/2021 1402 Last data filed at 3/6/2021 2245 Gross per 24 hour Intake  Output 300 ml Net -300 ml Physical Examination:  
 
I had a face to face encounter with this patient and independently examined them on 3/7/2021 as outlined below: 
 
     
Constitutional:  No acute distress, cachetic appearing, barely opens her eyes, does not follow commands Resp:  CTA bilaterally. No wheezing/rhonchi/rales. CV:  Regular rhythm, normal rate, no murmurs, GI:  Soft, non distended, non tender. n   
 Musculoskeletal: Diffuse muscle wasting and some contractures Neurologic:  not following any commands for me despite being awake and alert Skin= 
 
 
 
 
  
  
  
 
Data Review:  
 Review and/or order of clinical lab test 
Review and/or order of tests in the radiology section of CPT Review and/or order of tests in the medicine section of CPT Labs:  
 
Recent Labs 03/05/21 
2607 WBC 12.3* HGB 7.7* HCT 24.0*  
 Recent Labs 03/05/21 
8570 * K 3.4*  
* CO2 23 BUN 15  
CREA 0.50* * CA 9.2 No results for input(s): ALT, AP, TBIL, TBILI, TP, ALB, GLOB, GGT, AML, LPSE in the last 72 hours. 
 
No lab exists for component: SGOT, GPT, AMYP, HLPSE 
No results for input(s): INR, PTP, APTT, INREXT, INREXT in the last 72 hours.  
No results for input(s): FE, TIBC, PSAT, FERR in the last 72 hours.  
No results found for: FOL, RBCF  
No results for input(s): PH, PCO2, PO2 in the last 72 hours. 
No results for input(s): CPK, CKNDX, TROIQ in the last 72 hours. 
 
No lab exists for component: CPKMB 
No results found for: CHOL, CHOLX, CHLST, CHOLV, HDL, HDLP, LDL, LDLC, DLDLP, TGLX, TRIGL, TRIGP, CHHD, CHHDX 
Lab Results  
Component Value Date/Time  
 Glucose (POC) 120 (H) 01/11/2021 03:47 PM  
 
Lab Results  
Component Value Date/Time  
 Color YELLOW/STRAW 02/27/2021 08:44 PM  
 Appearance CLEAR 02/27/2021 08:44 PM  
 Specific gravity 1.020 01/11/2021 05:29 PM  
 pH (UA) 5.0 02/27/2021 08:44 PM  
 Protein Negative 02/27/2021 08:44 PM  
 Glucose Negative 02/27/2021 08:44 PM  
 Ketone Negative 02/27/2021 08:44 PM  
 Bilirubin Negative 02/27/2021 08:44 PM  
 Urobilinogen 0.2 02/27/2021 08:44 PM  
 Nitrites Negative 02/27/2021 08:44 PM  
 Leukocyte Esterase TRACE (A) 02/27/2021 08:44 PM  
 Epithelial cells FEW 02/27/2021 08:44 PM  
 Bacteria Negative 02/27/2021 08:44 PM  
 WBC 0-4 02/27/2021 08:44 PM  
 RBC 0-5 02/27/2021 08:44 PM  
 
 
 
Medications Reviewed:  
 
Current Facility-Administered Medications  
Medication Dose Route Frequency  
• morphine injection 1 mg  1 mg IntraVENous Q4H PRN  
• sodium hypochlorite (QUARTER STRENGTH DAKIN'S) 0.125% irrigation (bottle)   Topical DAILY  
• balsam peru-castor oiL (VENELEX) ointment   Topical BID  
• dextrose 5% infusion  75 mL/hr IntraVENous CONTINUOUS  
• levETIRAcetam (KEPPRA) 500 mg in 0.9% sodium chloride 100 mL IVPB  500 mg IntraVENous DAILY  
• sodium chloride (NS) flush 5-40 mL  5-40 mL IntraVENous Q8H  
  sodium chloride (NS) flush 5-40 mL  5-40 mL IntraVENous PRN  
 acetaminophen (TYLENOL) tablet 650 mg  650 mg Oral Q6H PRN Or  
 acetaminophen (TYLENOL) suppository 650 mg  650 mg Rectal Q6H PRN  
 
______________________________________________________________________ EXPECTED LENGTH OF STAY: 4d 19h ACTUAL LENGTH OF STAY:          8 Elvin Nissen, MD

## 2021-03-07 NOTE — HOSPICE
[facility] Liaison note: 
 
Patient vitals are stable, chronic wounds, has not needed any comfort medications in last 48 hours. Patient appears to have symptoms managed at this time and would not meet criteria for inpatient hospice admission. Patient is on comfort care and has medications in place. Thank you, 
 
Dominic Valles RN [facility] [phone] Mobile

## 2021-03-07 NOTE — PROGRESS NOTES
Bedside and Verbal shift change report given to Tami Florez RN (oncoming nurse) by Josue Jackson RN (offgoing nurse). Report included the following information SBAR, Kardex, Intake/Output, MAR, Recent Results and Med Rec Status.

## 2021-03-08 NOTE — PROGRESS NOTES
5TOC: 1. RUR-11% 2. Patient was accepted to [facility], but covid positive now and they are unable to accept her. 3. [facility] following- currently not meeting IP criteria. 4. Palliative following. 5. If patient d/c  From hospital,. She will need BLS transport. CM noted patient is currently not meeting GIP criteria per hospice notes. CM will attempt to secure placement with hospice.  
 
 
Gamal Araya, [facility]

## 2021-03-08 NOTE — PROGRESS NOTES
Bedside and Verbal shift change report given to [facility] (oncoming nurse) by Buddy Stovall (offgoing nurse). Report included the following information SBAR, Kardex, MAR and Recent Results.

## 2021-03-08 NOTE — WOUND CARE
WOCN Note:  
  
New consult placed for assessment of multiple wounds present on admission. Room 541 PPE: face shield, mask and gloves. 
  
Chart reviewed. Admitted DX:  Sacral decubitus ulcer Past Medical History:  
Diagnosis Date  Hypertension    
 Seasonal allergic rhinitis    
 Spleen absent    
  
Assessment:  
Patient is alert, non verbal and requires assist of 2 with repositioning. Bed: foam mattress Patient has a Bhatt. Patient premedicated by RN. Patient repositioned on left side with pillow. Heels offloaded with offloading boots. All wounds present on admission 1. POA Sacral, Pressure Injury Stage 4: 20 x 13 x 3 cm  70% black 30% red; large serosanguinous exudate; with malodor. Periwound edges with non blanching red erythema. 2.  Left hip, Pressure injury unstageable:  3.8 x 8 x 0.2 cm; 85% black 10% tan 5% pink; scant serosanguinous exudate; no odor; red periwound erythema. 3.  Right breast, partial thickness wound:  3 x 1 x 0.1 cm; 20% red 80% yellow; no exudate, odor or erythema. 4.  Right breast, DTI; 3 x 0.5 x 0 cm; 100% non blanching purple. 5.  Left breast, partial thickness wound:  0.5 x 0.5 x 0.1 cm; 100% pink; no exudate, odor or erythema. 6.  Left breast, DTI:  4 x 0.5 x 0 cm; 100% non blanching purple. 7.  Right elbow, partial thickness wound:  2.5 x 2 x 0.1 cm; 100% moist red; no odor or erythema. 8.  Right side of back, DTI:  Clustered along field 8 x 2 x 0 cm; non blanching purple. 9.  Left inner thigh, partial thickness wound:  0.5 x 0.5 x 0.1 cm; 100% pink 10. Left medial foot, DTI:  2 x 1.2 x 0 cm; non blanching purple. 11.  Left ankle, partially Fluid filled bullae with non blanching purple base:  3 x 2 x 0 cm. 12.  Left lateral 5th toe, DTI:  1 x 2 x 0 cm; 100% non blanching purple. 13.  Left lateral foot, DTI: 1 x 1 x 0 cm; non blanching purple. 14.  Left knee, blanching pink erythema:  8 x 7 x 0 cm.  
15.  Right knee, fluid filled bullae 3 x 3.5 x 0 cm with adjacent DTI 1 x 1 x 0 cm. 16.  Right low leg, partial thickness wound:  5 x 2 x 0.1 cm; 100% moist red; no odor or erthema. 17.  Right ankle, DTI; 2.2 x 2 x 0 cm; non blanching purple. 18.  Right lateral foot, DTI:  1.5 x 0.5 x 0 cm; non blanching purple. 19.  Right lateral foot, DTI:  2 x 1.5 x 0 cm; non blanching purple. 20. Right heel, pressure Injury stage 1: 1 x 3 x 0 cm  
21. Left upper back, scattered DTI over field 6 x 6 x 0 cm; non blanching purple. Wound, Pressure Prevention & Skin Care Recommendations: 1. Minimize layers of linen/pads under patient to optimize support surface. 2.  Turn/reposition approximately every 2 hours and offload heels. Patient mobility team to assist with q2 turns. 3.  Manage moisture/ Keep skin folds clean and dry. 4.  Specialty bed: prius ordered via [facility]. Use only flat sheet and one incontinence pad. 
5.  Sacrum:  Daily cleanse with Dakins; apply Silver Alginate (Opticel Ag); cover with dry dressing. 6.  Left hip:  Protect with foam. 
7.  Right breast, Left breast, left ankle, bilateral heels, left medial foot, left lateral foot and 5th toe, left knee, right knee, right ankle, right lateral foot and right and left back:  Apply Venelex BID 8. Right elbow and right low leg:  Daily cleanse with saline; apply Xeroform; cover with dry dressing. 9. Left inner thigh:  Apply Zinc cream BID. 
  
Discussed above plan with Willard Call RN. 
  
Transition of Care: Plan to follow as needed while admitted to hospital. 
  
MOJGAN Yao RN [facility] [place] Inpatient Wound Care Available on Perfect Serve Pager [phone] Office [phone]

## 2021-03-08 NOTE — PROGRESS NOTES
[address] [facility] Adult  Hospitalist Group Hospitalist Progress Note Hawa Oden MD 
Answering service: [phone] from in house phone Date of Service:  3/8/2021 NAME:  Ady Maldonado :  1935 MRN:  [id number] Admission Summary:  
Ady Maldonado is a 82 y.o. female with hx HTN, seasonal allergic rhinitis, asplenia, and dementia who presents with worsening left hip wound, and sacral decubitus ulcer. She was on hospice, but rescinded hospice today. Interval history / Subjective:  
 patient awake but not following commands for me She does not appear to be distressed Talked to patient son and spouse in detail . Spouse defer all decision to son. Son and spouse is ok with comfort measures. Answered all questions in detail. Had a lengthy discussion with son. Sent perfect serve message to Hospice/palliaitve. Also updated CM. Both spouse and son in agreement with plan Waiting for inpatient hospcie acceptance As per most recent documentation. She does not meet inpatient hospcie criteria yet. Patient barely opens her eyes. Does not follow any command. Hospice following . At this time mancera snot meet criteria of inpatient hospice D/w palliative. Will stop fluids. Ordered sips for comfort and pureed diet if patiet desires. Waiting for placement Assessment & Plan: #Severe Sepsis with shock- resolved 
-BP low stable, lactic acid normal, no fevers - abx stopped as patient is now comfort measures. Family opted for comfort measures  
  #Sacral Decub, left hip wound 
-abx stopped  
-consulted gen surg , recommending hospice. -now on comfort measures  
  #Acute Renal Insuffieciency - resolved 
  #Hypotensive, improved Hypernatremia- improved #Dementia w/ severe debility, adult failure to thrive 
-was on hospice, palliative care met with family and plans for patient to be discharged back onto hospice- COVID test for placement was +, asymptomatic Code status changed back to DNR Code status:  DNR  
DVT prophylaxis: Heparin Care Plan discussed with: Patient/Family Anticipated Disposition: hospice facility Anticipated Discharge: 24 hours to 48 hours Hospital Problems  Date Reviewed: 1/10/2021 Codes Class Noted POA Sacral decubitus ulcer ICD-10-CM: L89.159 ICD-9-CM: 707.03, 707.20  2/27/2021 Unknown Review of Systems:  
Review of systems not obtained due to patient factors. Vital Signs:  
 Last 24hrs VS reviewed since prior progress note. Most recent are: 
Visit Vitals BP (!) 113/58 (BP Patient Position: At rest;Lying right side) Pulse (!) 112 Temp 100.1 °F (37.8 °C) Resp 19 Ht 5' 6\" (1.676 m) Wt 67 kg (147 lb 11.3 oz) SpO2 96% BMI 23.84 kg/m² Intake/Output Summary (Last 24 hours) at 3/8/2021 1355 Last data filed at 3/8/2021 0234 Gross per 24 hour Intake  Output 400 ml Net -400 ml Physical Examination:  
 
I had a face to face encounter with this patient and independently examined them on 3/8/2021 as outlined below: 
 
     
Constitutional:  No acute distress, cachetic appearing, barely opens her eyes, does not follow commands Resp:  CTA bilaterally. No wheezing/rhonchi/rales. CV:  Regular rhythm, normal rate, no murmurs, GI:  Soft, non distended, non tender. n   
 Musculoskeletal: Diffuse muscle wasting and some contractures Neurologic:  not following any commands for me despite being awake and alert Skin= 
 
 
 
 
  
  
  
 
Data Review:  
 Review and/or order of clinical lab test 
Review and/or order of tests in the radiology section of CPT Review and/or order of tests in the medicine section of CPT Labs:  
 
No results for input(s): WBC, HGB, HCT, PLT, HGBEXT, HCTEXT, PLTEXT, HGBEXT, HCTEXT, PLTEXT in the last 72 hours.  
No results for input(s): NA, K, CL, CO2, BUN, CREA, GLU, CA, MG, PHOS, URICA in the last 72 hours. No results for input(s): ALT, AP, TBIL, TBILI, TP, ALB, GLOB, GGT, AML, LPSE in the last 72 hours. No lab exists for component: SGOT, GPT, AMYP, HLPSE No results for input(s): INR, PTP, APTT, INREXT, INREXT in the last 72 hours. No results for input(s): FE, TIBC, PSAT, FERR in the last 72 hours. No results found for: FOL, RBCF No results for input(s): PH, PCO2, PO2 in the last 72 hours. No results for input(s): CPK, CKNDX, TROIQ in the last 72 hours. No lab exists for component: CPKMB No results found for: CHOL, CHOLX, CHLST, CHOLV, HDL, HDLP, LDL, LDLC, DLDLP, TGLX, TRIGL, TRIGP, CHHD, CHHDX Lab Results Component Value Date/Time Glucose (POC) 120 (H) 01/11/2021 03:47 PM  
 
Lab Results Component Value Date/Time Color YELLOW/STRAW 02/27/2021 08:44 PM  
 Appearance CLEAR 02/27/2021 08:44 PM  
 Specific gravity 1.020 01/11/2021 05:29 PM  
 pH (UA) 5.0 02/27/2021 08:44 PM  
 Protein Negative 02/27/2021 08:44 PM  
 Glucose Negative 02/27/2021 08:44 PM  
 Ketone Negative 02/27/2021 08:44 PM  
 Bilirubin Negative 02/27/2021 08:44 PM  
 Urobilinogen 0.2 02/27/2021 08:44 PM  
 Nitrites Negative 02/27/2021 08:44 PM  
 Leukocyte Esterase TRACE (A) 02/27/2021 08:44 PM  
 Epithelial cells FEW 02/27/2021 08:44 PM  
 Bacteria Negative 02/27/2021 08:44 PM  
 WBC 0-4 02/27/2021 08:44 PM  
 RBC 0-5 02/27/2021 08:44 PM  
 
 
 
Medications Reviewed:  
 
Current Facility-Administered Medications Medication Dose Route Frequency  morphine injection 1 mg  1 mg IntraVENous Q4H PRN  
 sodium hypochlorite (QUARTER STRENGTH DAKIN'S) 0.125% irrigation (bottle)   Topical DAILY  balsam peru-castor oiL (VENELEX) ointment   Topical BID  dextrose 5% infusion  75 mL/hr IntraVENous CONTINUOUS  
 levETIRAcetam (KEPPRA) 500 mg in 0.9% sodium chloride 100 mL IVPB  500 mg IntraVENous DAILY  sodium chloride (NS) flush 5-40 mL  5-40 mL IntraVENous Q8H  
 sodium chloride (NS) flush 5-40 mL  5-40 mL IntraVENous PRN  
 acetaminophen (TYLENOL) tablet 650 mg  650 mg Oral Q6H PRN Or  
 acetaminophen (TYLENOL) suppository 650 mg  650 mg Rectal Q6H PRN  
 
______________________________________________________________________ EXPECTED LENGTH OF STAY: 4d 19h ACTUAL LENGTH OF STAY:          9 Joel Dowling MD

## 2021-03-08 NOTE — PROGRESS NOTES
Problem: Breathing Pattern - Ineffective Goal: *Absence of hypoxia Outcome: Progressing Towards Goal 
Goal: *PALLIATIVE CARE:  Alleviation of Dyspnea Outcome: Progressing Towards Goal 
  
Problem: Falls - Risk of 
Goal: *Absence of Falls Description: Document Michelle Hughes Fall Risk and appropriate interventions in the flowsheet. Outcome: Progressing Towards Goal 
Note: Fall Risk Interventions: 
  
 
Mentation Interventions: Adequate sleep, hydration, pain control Medication Interventions: Evaluate medications/consider consulting pharmacy Elimination Interventions: Toileting schedule/hourly rounds Problem: Patient Education: Go to Patient Education Activity Goal: Patient/Family Education Outcome: Progressing Towards Goal

## 2021-03-08 NOTE — PROGRESS NOTES
03/08/21 5973 Vital Signs Temp 100.1 °F (37.8 °C) Temp Source Axillary Pulse (Heart Rate) (!) 112 Heart Rate Source Monitor Resp Rate 19  
O2 Sat (%) 96 % Level of Consciousness Alert  
BP (!) 113/58 MAP (Calculated) 76 BP 1 Method Automatic  
BP Patient Position At rest;Lying right side MEWS Score 3 Oxygen Therapy O2 Device Room air  
comfort measure only orders in place.

## 2021-03-08 NOTE — PROGRESS NOTES
PALLIATIVE MEDICINE Chart reviewed and discussed with the hospice liaison ~Laurie Gifford. Goals are clear for hospice. Unfortunately, the pt does not meet inpatient criteria for hospice at this time. Hospice evaluates the pt daily. The pt has a dispo but until she is COVID negative they will not accept her. There are no overwhelming symptoms for us to manage. Our team has nothing to add at this time. Continue comfort care in house until the [place] is negative and she can be discharged. Nina Arizmendi.  [address] MSN, FNP-BC, [facility]

## 2021-03-09 NOTE — PROGRESS NOTES
[facility] [facility] Adult  Hospitalist Group Hospitalist Progress Note Erwin Calzada MD 
Answering service: [phone] from in house phone Date of Service:  3/9/2021 NAME:  Abdulaziz Acosta :  1935 MRN:  [id number] Admission Summary:  
Abdulaziz Acosta is a 82 y.o. female with hx HTN, seasonal allergic rhinitis, asplenia, and dementia who presents with worsening left hip wound, and sacral decubitus ulcer. She was on hospice, but rescinded hospice today. Interval history / Subjective:  
 patient awake but not following commands for me She does not appear to be distressed Talked to patient son and spouse in detail . Spouse defer all decision to son. Son and spouse is ok with comfort measures. Answered all questions in detail. Had a lengthy discussion with son. Sent perfect serve message to Hospice/palliaitve. Also updated CM. Both spouse and son in agreement with plan Waiting for inpatient hospcie acceptance As per most recent documentation. She does not meet inpatient hospcie criteria yet. Patient barely opens her eyes. Does not follow any command. D/w palliative. Will stop fluids. Ordered sips for comfort and pureed diet if patiet desires. Waiting for placement No change in clinical condition. Not a  Candidate for inpatient hospcie at this time. Assessment & Plan: #Severe Sepsis with shock- resolved 
-BP low stable, lactic acid normal, no fevers - abx stopped as patient is now comfort measures. Family opted for comfort measures  
  #Sacral Decub, left hip wound 
-abx stopped  
-consulted gen surg , recommending hospice. -now on comfort measures  
  #Acute Renal Insuffieciency - resolved 
  #Hypotensive, improved Hypernatremia- improved #Dementia w/ severe debility, adult failure to thrive 
-was on hospice, palliative care met with family and plans for patient to be discharged back onto hospice- COVID test for placement was +, asymptomatic Code status changed back to DNR Code status:  DNR  
DVT prophylaxis: heparin was stopped after comfort measures Care Plan discussed with: Patient/Family Anticipated Disposition: hospice facility Anticipated Discharge: 24 hours to 48 hours Hospital Problems  Date Reviewed: 1/10/2021 Codes Class Noted POA Sacral decubitus ulcer ICD-10-CM: L89.159 ICD-9-CM: 707.03, 707.20  2/27/2021 Unknown Review of Systems:  
Review of systems not obtained due to patient factors. Vital Signs:  
 Last 24hrs VS reviewed since prior progress note. Most recent are: 
Visit Vitals /64 (BP 1 Location: Left upper arm, BP Patient Position: At rest;Lying left side) Pulse 90 Temp 100 °F (37.8 °C) Resp 18 Ht 5' 6\" (1.676 m) Wt 67 kg (147 lb 11.3 oz) SpO2 97% BMI 23.84 kg/m² Intake/Output Summary (Last 24 hours) at 3/9/2021 1358 Last data filed at 3/9/2021 1031 Gross per 24 hour Intake  Output 450 ml Net -450 ml Physical Examination:  
 
I had a face to face encounter with this patient and independently examined them on 3/9/2021 as outlined below: 
 
     
Constitutional:  No acute distress, cachetic appearing, barely opens her eyes, does not follow commands Resp:  CTA bilaterally. No wheezing/rhonchi/rales. CV:  Regular rhythm, normal rate, no murmurs, GI:  Soft, non distended, non tender. n   
 Musculoskeletal: Diffuse muscle wasting and some contractures Neurologic:  not following any commands for me despite being awake and alert Skin= 
 
 
 
 
  
  
  
 
Data Review:  
 Review and/or order of clinical lab test 
Review and/or order of tests in the radiology section of CPT Review and/or order of tests in the medicine section of CPT Labs:  
 
No results for input(s): WBC, HGB, HCT, PLT, HGBEXT, HCTEXT, PLTEXT, HGBEXT, HCTEXT, PLTEXT in the last 72 hours. No results for input(s): NA, K, CL, CO2, BUN, CREA, GLU, CA, MG, PHOS, URICA in the last 72 hours. No results for input(s): ALT, AP, TBIL, TBILI, TP, ALB, GLOB, GGT, AML, LPSE in the last 72 hours. No lab exists for component: SGOT, GPT, AMYP, HLPSE No results for input(s): INR, PTP, APTT, INREXT, INREXT in the last 72 hours. No results for input(s): FE, TIBC, PSAT, FERR in the last 72 hours. No results found for: FOL, RBCF No results for input(s): PH, PCO2, PO2 in the last 72 hours. No results for input(s): CPK, CKNDX, TROIQ in the last 72 hours. No lab exists for component: CPKMB No results found for: CHOL, CHOLX, CHLST, CHOLV, HDL, HDLP, LDL, LDLC, DLDLP, TGLX, TRIGL, TRIGP, CHHD, CHHDX Lab Results Component Value Date/Time Glucose (POC) 120 (H) 01/11/2021 03:47 PM  
 
Lab Results Component Value Date/Time Color YELLOW/STRAW 02/27/2021 08:44 PM  
 Appearance CLEAR 02/27/2021 08:44 PM  
 Specific gravity 1.020 01/11/2021 05:29 PM  
 pH (UA) 5.0 02/27/2021 08:44 PM  
 Protein Negative 02/27/2021 08:44 PM  
 Glucose Negative 02/27/2021 08:44 PM  
 Ketone Negative 02/27/2021 08:44 PM  
 Bilirubin Negative 02/27/2021 08:44 PM  
 Urobilinogen 0.2 02/27/2021 08:44 PM  
 Nitrites Negative 02/27/2021 08:44 PM  
 Leukocyte Esterase TRACE (A) 02/27/2021 08:44 PM  
 Epithelial cells FEW 02/27/2021 08:44 PM  
 Bacteria Negative 02/27/2021 08:44 PM  
 WBC 0-4 02/27/2021 08:44 PM  
 RBC 0-5 02/27/2021 08:44 PM  
 
 
 
Medications Reviewed:  
 
Current Facility-Administered Medications Medication Dose Route Frequency  LORazepam (ATIVAN) injection 2 mg  2 mg IntraVENous Q4H PRN  
 morphine injection 1 mg  1 mg IntraVENous Q4H PRN  
 sodium hypochlorite (QUARTER STRENGTH DAKIN'S) 0.125% irrigation (bottle)   Topical DAILY  balsam peru-castor oiL (VENELEX) ointment   Topical BID  sodium chloride (NS) flush 5-40 mL  5-40 mL IntraVENous Q8H  
 sodium chloride (NS) flush 5-40 mL  5-40 mL IntraVENous PRN  
 acetaminophen (TYLENOL) tablet 650 mg  650 mg Oral Q6H PRN Or  
 acetaminophen (TYLENOL) suppository 650 mg  650 mg Rectal Q6H PRN  
 
______________________________________________________________________ EXPECTED LENGTH OF STAY: 4d 19h ACTUAL LENGTH OF STAY:          Elder Cuello MD

## 2021-03-09 NOTE — HOSPICE
[employer] Good Help to Those in Need 
[phone] Patient Name: Zahida Wallace YOB: 1935 Age: 82 y.o. [employer] [facility] RN Note: Eval of patient today. She is resting quietly with no distress , eyes are open and she tried to speak but I could not understand. When I moved her arm, there was no change. Nurse reports fluids were stopped yesterday. When she is offered food she clamps her lips tight. Nurse gave morphine before repositioning her 2 times today. Reporting some grimacing with movement. We will eval again tomorrow. Not GIP at this time. Thank you for the opportunity to be of service to this patient.

## 2021-03-09 NOTE — PROGRESS NOTES
Bedside and Verbal shift change report given to Jesu Yarbrough (oncoming nurse) by Ellen Nunez (offgoing nurse). Report included the following information SBAR, Kardex and MAR.

## 2021-03-09 NOTE — PROGRESS NOTES
ROSALINA: 1. RUR-11% 2. Admitted from home, plan to discharge to [address] if covid negative. 3. Hospice and Palliative following. 4. S transport on discharge.  
 
 
 
MD ordered COVID PCR test. 
 
 
Gamal Araya, [facility]

## 2021-03-10 NOTE — PROGRESS NOTES
TRANSITION OF CARE 
RUR--11% Disposition--To [facility] home  [address] tentatively 3/11/21. Son Tiffany Zepeda to coordinate DME delivery with [address] Transport--BLS stretcher with [facility]. CM noted that [facility] today assessed that patient does not meet GIP criteria. Later, covid result returned and was Negative. JACQUE spoke with hospitalist who said that patient could be discharged today or tomorrow. CM spoke with son Venecia McCalla [phone] , and update was given. Son contacted Iván Roach ([phone]) and confirmed bed availability a finalized financial arrangements. Son Donnamichael Larry said that he will proceed with making DME arrangements with [facility] which will not be finalized until tomorrow. Transport arrangements are still pending.

## 2021-03-10 NOTE — HOSPICE
T/C to primary nurse who reports that patient is not eating or drinking today. Patient with slight grimace when moved but otherwise comfortable. With no symptoms to manage, patient is not GIP LOC at this time. If patient's most recent Covid test is negative, we can get her back to the original plan of [facility] group home for EOL care. Yamile Rosenthal RN MSN [facility] Nurse Liaison [facility] [phone] ( mobile) [phone] ( office)

## 2021-03-10 NOTE — PROGRESS NOTES
[facility] [facility] Adult  Hospitalist Group Hospitalist Progress Note Stephon Plata MD 
Answering service: [phone] from in house phone Date of Service:  3/10/2021 NAME:  Tess Torres :  1935 MRN:  [id number] Admission Summary:  
Tess Torres is a 82 y.o. female with hx HTN, seasonal allergic rhinitis, asplenia, and dementia who presents with worsening left hip wound, and sacral decubitus ulcer. She was on hospice, but rescinded hospice today. Interval history / Subjective:  
 patient awake but not following commands for me She does not appear to be distressed Talked to patient son and spouse in detail . Spouse defer all decision to son. Son and spouse is ok with comfort measures. Answered all questions in detail. Had a lengthy discussion with son. Sent perfect serve message to Hospice/palliaitve. Also updated CM. Both spouse and son in agreement with plan Waiting for inpatient hospcie acceptance As per most recent documentation. She does not meet inpatient hospcie criteria yet. Patient barely opens her eyes. Does not follow any command. D/w palliative. Will stop fluids. Ordered sips for comfort and pureed diet if patiet desires. Waiting for placement No change in clinical condition. Not a  Candidate for inpatient hospcie at this time. Repeat testing negative for Covid. Likely will be discharged tomorrow once placement is available Assessment & Plan: #Severe Sepsis with shock- resolved 
-BP low stable, lactic acid normal, no fevers - abx stopped as patient is now comfort measures. Family opted for comfort measures  
  #Sacral Decub, left hip wound 
-abx stopped  
-consulted gen surg , recommending hospice. -now on comfort measures  
  #Acute Renal Insuffieciency - resolved 
  #Hypotensive, improved Hypernatremia- improved #Dementia w/ severe debility, adult failure to thrive 
-was on hospice, palliative care met with family and plans for patient to be discharged back onto hospice- COVID test for placement was +, asymptomatic Code status changed back to DNR Code status:  DNR  
DVT prophylaxis: heparin was stopped after comfort measures Care Plan discussed with: Patient/Family Anticipated Disposition: hospice facility Anticipated Discharge: 24 hours to 48 hours Hospital Problems  Date Reviewed: 1/10/2021 Codes Class Noted POA Sacral decubitus ulcer ICD-10-CM: L89.159 ICD-9-CM: 707.03, 707.20  2/27/2021 Unknown Review of Systems:  
Review of systems not obtained due to patient factors. Vital Signs:  
 Last 24hrs VS reviewed since prior progress note. Most recent are: 
Visit Vitals /69 (BP 1 Location: Left upper arm, BP Patient Position: At rest;Lying left side) Pulse 99 Temp 99.2 °F (37.3 °C) Resp 16 Ht 5' 6\" (1.676 m) Wt 67 kg (147 lb 11.3 oz) SpO2 98% BMI 23.84 kg/m² Intake/Output Summary (Last 24 hours) at 3/10/2021 1552 Last data filed at 3/10/2021 1013 Gross per 24 hour Intake  Output 550 ml Net -550 ml Physical Examination:  
 
I had a face to face encounter with this patient and independently examined them on 3/10/2021 as outlined below: 
 
     
Constitutional:  No acute distress, cachetic appearing, barely opens her eyes, does not follow commands Resp:  CTA bilaterally. No wheezing/rhonchi/rales. CV:  Regular rhythm, normal rate, no murmurs, GI:  Soft, non distended, non tender. n   
 Musculoskeletal: Diffuse muscle wasting and some contractures Neurologic:  not following any commands for me despite being awake and alert Skin= 
 
 
 
 
  
  
  
 
Data Review:  
 Review and/or order of clinical lab test 
Review and/or order of tests in the radiology section of CPT Review and/or order of tests in the medicine section of CPT Labs:  
 
No results for input(s): WBC, HGB, HCT, PLT, HGBEXT, HCTEXT, PLTEXT, HGBEXT, HCTEXT, PLTEXT in the last 72 hours. No results for input(s): NA, K, CL, CO2, BUN, CREA, GLU, CA, MG, PHOS, URICA in the last 72 hours. No results for input(s): ALT, AP, TBIL, TBILI, TP, ALB, GLOB, GGT, AML, LPSE in the last 72 hours. No lab exists for component: SGOT, GPT, AMYP, HLPSE No results for input(s): INR, PTP, APTT, INREXT, INREXT in the last 72 hours. No results for input(s): FE, TIBC, PSAT, FERR in the last 72 hours. No results found for: FOL, RBCF No results for input(s): PH, PCO2, PO2 in the last 72 hours. No results for input(s): CPK, CKNDX, TROIQ in the last 72 hours. No lab exists for component: CPKMB No results found for: CHOL, CHOLX, CHLST, CHOLV, HDL, HDLP, LDL, LDLC, DLDLP, TGLX, TRIGL, TRIGP, CHHD, CHHDX Lab Results Component Value Date/Time Glucose (POC) 120 (H) 01/11/2021 03:47 PM  
 
Lab Results Component Value Date/Time Color YELLOW/STRAW 02/27/2021 08:44 PM  
 Appearance CLEAR 02/27/2021 08:44 PM  
 Specific gravity 1.020 01/11/2021 05:29 PM  
 pH (UA) 5.0 02/27/2021 08:44 PM  
 Protein Negative 02/27/2021 08:44 PM  
 Glucose Negative 02/27/2021 08:44 PM  
 Ketone Negative 02/27/2021 08:44 PM  
 Bilirubin Negative 02/27/2021 08:44 PM  
 Urobilinogen 0.2 02/27/2021 08:44 PM  
 Nitrites Negative 02/27/2021 08:44 PM  
 Leukocyte Esterase TRACE (A) 02/27/2021 08:44 PM  
 Epithelial cells FEW 02/27/2021 08:44 PM  
 Bacteria Negative 02/27/2021 08:44 PM  
 WBC 0-4 02/27/2021 08:44 PM  
 RBC 0-5 02/27/2021 08:44 PM  
 
 
 
Medications Reviewed:  
 
Current Facility-Administered Medications Medication Dose Route Frequency  LORazepam (ATIVAN) injection 2 mg  2 mg IntraVENous Q4H PRN  
 morphine injection 1 mg  1 mg IntraVENous Q4H PRN  
 sodium hypochlorite (QUARTER STRENGTH DAKIN'S) 0.125% irrigation (bottle)   Topical DAILY  balsam peru-castor oiL (VENELEX) ointment Topical BID  sodium chloride (NS) flush 5-40 mL  5-40 mL IntraVENous Q8H  
 sodium chloride (NS) flush 5-40 mL  5-40 mL IntraVENous PRN  
 acetaminophen (TYLENOL) tablet 650 mg  650 mg Oral Q6H PRN Or  
 acetaminophen (TYLENOL) suppository 650 mg  650 mg Rectal Q6H PRN  
 
______________________________________________________________________ EXPECTED LENGTH OF STAY: 4d 19h ACTUAL LENGTH OF STAY:          Mag Fishman MD

## 2021-03-10 NOTE — PROGRESS NOTES
Bedside and Verbal shift change report given to Mer Wayne RN (oncoming nurse) by Werner Sanchez (offgoing nurse). Report included the following information SBAR, Kardex, Intake/Output, MAR, Recent Results and Med Rec Status.

## 2021-03-11 NOTE — HOSPICE
[facility] [facility] Good Help to Those in Need 
[phone] Patient Name: Sharon Hand YOB: 1935 Age: 82 y.o. [employer] [facility] Social Worker Note: 
 
Consents Reviewed: Yes Person Reviewed/Signed with: Kassandra Olivo, pts . Right to NCD Reviewed: Yes NCD Requested: Yes Admission Nurse/Intake Notified: Yes Planned Start of Care Date: 3/11/2021 Hospice Witness Representative:Dawn Lobo LCSW Psychosocial needs; LCSW met with pts  Samira Wyatt and son Caitlyn Porter to sign consents for hospice admission today at [place] [address]. Pt was to be dc'ed several days ago, but tested COVID+ and remained in the hospital. Pt tested COVID - and is now being transferred to [address] [place] [address]. Pt/caregiver; Pt was living at home with her . Pt has large sacral wound, PCM and Dementia. APS referral was made.  does not want pt to return home as he feels he cannot care for her. Family is paying OOP for Medical Group home. Pt has Medicaid pending, UAI was completed by CM at [facility] and was sent to son Samira Wyatt via transportation packet with pt to group home. Pt is DDNR. Home admission is scheduled for today at 4 pm  
 
Thank you for the opportunity to be of service to Ms. Royal and her family. Dawn Lobo LCSW,  [facility] Worker [phone]

## 2021-03-11 NOTE — PROGRESS NOTES
03/11/21 1130 Vital Signs Temp 100.2 °F (37.9 °C) Temp Source Axillary Pulse (Heart Rate) (!) 108 Heart Rate Source Monitor Resp Rate 17  
O2 Sat (%) 98 % Level of Consciousness Responds to Voice BP (!) 103/54 MAP (Calculated) 70 BP 1 Method Automatic  
BP 1 Location Right upper arm BP Patient Position At rest  
MEWS Score 3 Oxygen Therapy O2 Device Room air Pt tachycardic and slightly hypotensive. Comfort measures only. Will treat for pain and agitation. Will continue to monitor.

## 2021-03-11 NOTE — HOSPICE
Picked up [facility] from the outpatient pharmacy. Medications ordered were all accounted for in presence of pharmacistHanane and bag was stapled shut. Meds were hand delivered to unit and accepted by primary nurse, Camilo Zaidi. Medications will travel with patient by way of [facility]. Poncho Anaya RN MSN [facility] Nurse Liaison [facility] [phone] ( mobile) [phone] ( office)

## 2021-03-11 NOTE — PALLIATIVE CARE DISCHARGE
Goals of Care/Treatment Preferences The Palliative Medicine team was consulted as part of your/your loved one's care in the hospital. Our team is a supportive service; we strive to relieve suffering and improve quality of life. We reviewed advance care planning information, which includes the following: 
  
 
 Patient/Health Care Proxy Stated Goals: Comfort We reviewed / discussed your code status as: DNR 
   Full Code means perform CPR in the event of cardiac arrest. 
    DNR means do NOT perform CPR in the event of cardiac arrest. 
    Partial Code means you have specific preferences, please discuss with your healthcare team. 
    Odalis Lion means this issue was not addressed / resolved during your stay Because of the importance of this information, we are providing you with a printed copy to share with other healthcare providers after this hospitalization is complete.

## 2021-03-11 NOTE — DISCHARGE SUMMARY
Discharge Summary PATIENT ID: Campos Cook MRN: [id number] YOB: 1935 DATE OF ADMISSION: 2/27/2021  7:36 PM   
DATE OF DISCHARGE: 03/11/2021 PRIMARY CARE PROVIDER: Jeannette Palacios MD  
 
ATTENDING PHYSICIAN: Erick Haney DISCHARGING PROVIDER: Mal Griffin NP To contact this individual call [phone] and ask the  to page. If unavailable ask to be transferred the Adult Hospitalist Department. CONSULTATIONS: IP CONSULT TO GENERAL SURGERY 
IP CONSULT TO PALLIATIVE CARE - PROVIDER PROCEDURES/SURGERIES: * No surgery found * [address] COURSE:  
Kimberly Royal is a 82 y. o. female with hx HTN, seasonal allergic rhinitis, asplenia, and dementia who presents with worsening left hip wound, and sacral decubitus ulcer.  She was on hospice, but rescinded hospice today DISCHARGE DIAGNOSES / PLAN:   
 
Plan: To be admitted into hospice services. #Severe Sepsis with shock- resolved 
-BP low stable, lactic acid normal, no fevers - abx stopped as patient is now comfort measures. Family opted for comfort measures  
  #Sacral Decub, left hip wound 
-abx stopped  
-consulted gen surg , recommending hospice. -now on comfort measures  
  #Acute Renal Insuffieciency - resolved 
  #Hypotensive, improved  
  
Hypernatremia- improved 
  
#Dementia w/ severe debility, adult failure to thrive 
-was on hospice, palliative care met with family and plans for patient to be discharged back onto hospice- COVID test for placement was +, asymptomatic Code status changed back to DNR  
 
ADDITIONAL CARE RECOMMENDATIONS:  
Planned for patient to be admitted into hospice services upon discharge. PENDING TEST RESULTS:  
At the time of discharge the following test results are still pending: None FOLLOW UP APPOINTMENTS:   
Follow-up Information Follow up With Specialties Details Why Contact Info  Jeannette Palacios MD Family Medicine  Follow up as needed. [address] 
[phone] DIET: Comfort feeding ACTIVITY: Activity as tolerated WOUND CARE: Wound, Pressure Prevention & Skin Care Recommendations:   
1. Minimize layers of linen/pads under patient to optimize support surface.   
2.  Turn/reposition approximately every 2 hours and offload heels.  Patient mobility team to assist with q2 turns. 3.  Manage moisture/ Keep skin folds clean and dry. 4.  Specialty bed: prius ordered via [employer]. Use only flat sheet and one incontinence pad. 5.  Sacrum:  Daily cleanse with Dakins; apply Silver Alginate (Opticel Ag); cover with dry dressing. 6.  Left hip:  Protect with foam. 
7.  Right breast, Left breast, left ankle, bilateral heels, left medial foot, left lateral foot and 5th toe, left knee, right knee, right ankle, right lateral foot and right and left back:  Apply Venelex BID 8.  Right elbow and right low leg:  Daily cleanse with saline; apply Xeroform; cover with dry dressing. 9. Left inner thigh:  Apply Zinc cream BID. EQUIPMENT needed: Per hospice DISCHARGE MEDICATIONS: 
Current Discharge Medication List  
  
START taking these medications Details  
balsam peru-castor oiL (VENELEX) ointment Apply  to affected area two (2) times a day. Qty: 1 Tube, Refills: 0  
  
sodium hypochlorite (QUARTER STRENGTH DAKIN'S) 0.125 % soln external solution Apply 473 mL to affected area daily. Qty: 1 Bottle, Refills: 0 CONTINUE these medications which have NOT CHANGED Details  
docusate sodium (COLACE) 100 mg capsule Take 1 Cap by mouth two (2) times a day for 90 days. Qty: 60 Cap, Refills: 2  
  
danazoL (DANOCRINE) 200 mg capsule Take 1 Cap by mouth every other day. 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  
  
amLODIPine (NORVASC) 2.5 mg tablet TAKE 1 TABLET ONCE BY MOUTH EVERY DAY Refills: 0 FLUZONE HIGH-DOSE 2017-18, PF, syrg injection TO BE ADMINISTERED BY PHARMACIST FOR IMMUNIZATION Refills: 0  
  
lisinopril (PRINIVIL, ZESTRIL) 20 mg tablet TAKE 1 TABLET BY MOUTH EVERY DAY Qty: 90 Tab, Refills: 3 Associated Diagnoses: Essential hypertension  
  
citalopram (CELEXA) 10 mg tablet   
  
levetiracetam (KEPPRA) 500 mg tablet Take 500 mg by mouth daily. Associated Diagnoses: HTN (hypertension); Cardiac murmur NOTIFY YOUR PHYSICIAN FOR ANY OF THE FOLLOWING:  
Fever over 101 degrees for 24 hours. Chest pain, shortness of breath, fever, chills, nausea, vomiting, diarrhea, change in mentation, falling, weakness, bleeding. Severe pain or pain not relieved by medications. Or, any other signs or symptoms that you may have questions about. DISPOSITION: 
  Home With: 
 OT  PT  HH  RN  
  
 Long term SNF/Inpatient Rehab Independent/assisted living X Hospice Other:  
 
 
PATIENT CONDITION AT DISCHARGE:  
 
Functional status X Poor Deconditioned Independent Cognition Rey Johnson Forgetful X Dementia Catheters/lines (plus indication) X Bhatt PICC   
 PEG None Code status Full code X DNR   
 
PHYSICAL EXAMINATION AT DISCHARGE: 
General:          Alert, no distress, appears stated age. HEENT:           Atraumatic, anicteric sclerae, pink conjunctivae No oral ulcers, mucosa moist, throat clear, dentition fair Neck:               Supple, symmetrical 
Lungs:             Clear to auscultation bilaterally. No Wheezing or Rhonchi. No rales. Chest wall:      No tenderness  No Accessory muscle use. Heart:              Regular  rhythm,  No  murmur   No edema Abdomen:        Soft, non-tender. Not distended. Bowel sounds normal 
Extremities:     No cyanosis. No clubbing,   
                        Skin turgor normal, Capillary refill normal 
Skin:                Multiple wounds Psych:             Not anxious or agitated. Neurologic:      Alert CHRONIC MEDICAL DIAGNOSES: 
Problem List as of 3/11/2021 Date Reviewed: 1/10/2021 Codes Class Noted - Resolved Sacral decubitus ulcer ICD-10-CM: L89.159 ICD-9-CM: 707.03, 707.20  2/27/2021 - Present Small bowel obstruction ([facility].) ICD-10-CM: S52.662 ICD-9-CM: 560.9  1/10/2021 - Present Neutropenia ([facility].) ICD-10-CM: D70.9 ICD-9-CM: 288.00  4/17/2019 - Present ITP secondary to infection ICD-10-CM: D69.59 ICD-9-CM: 287.49  6/2/2017 - Present Murmur, cardiac ICD-10-CM: R01.1 ICD-9-CM: 785.2  10/3/2014 - Present Greater than 40  minutes were spent with the patient on counseling and coordination of care Signed:  
Osvaldo Espinal NP 
3/11/2021 
10:02 AM

## 2021-03-11 NOTE — PROGRESS NOTES
Bedside and Verbal shift change report given to Milan Prdie (oncoming nurse) by Valentin Christie (offgoing nurse). Report included the following information SBAR, Kardex, Intake/Output, MAR and Recent Results.

## 2021-03-11 NOTE — HOSPICE
Notified of patient's negative Covid test this morning by JACQUE. CM called [facility] and they are willing to take her as a hospice patient today and we can admit at 4 pm. 
DME ( low air loss mattress) will be delivered to [facility] today before noon by Rene. # [id number] SRK has been ordered from the [facility] pharmacy and will be delivered to the patient's room prior to discharge. Dr. Jordon Castañeda will follow the patient as attending. New consents will be signed this morning with son, Nasra Sutherland with hospice social worker, Maikel Elizondo LCSW. Please send 2 days of wound care supplies home with the patient to ensure sufficient supply until her hospice wound supplies arrive at the facility. Thank you for the opportunity to serve this patient. Lenard Mcclain RN MSN [facility] Nurse Liaison Jefe Johnson [phone] ( mobile) [phone] ( office)

## 2021-03-11 NOTE — PROGRESS NOTES
Palliative Medicine Social Work Chart reviewed. Pt still does not meet GIP hospice criteria as symptoms are managed. Acknowledge CM note that patient will d/c to facility TH/Fr with hospice. Will sign off for now. Please call if needed for support. [phone] Thank you for the opportunity to be involved in the care of Ms. Royal and her family. Sin Garcia LMSW, Supervisee in Social Work Palliative Medicine  [phone]

## 2021-03-11 NOTE — PROGRESS NOTES
Patient is discharging to [address] home with [employer] [facility] today. She will be transported via [facility] at 1430. Folder on chart. Her son is aware of the plan. CM emailed [address] with the group home the d/c summary and covid lab result, [email or web address] 
 
CM notified RN to supply patient with 2 days of wound care supplies per hospice request. 
 
Medicare pt has received, reviewed, and signed 2nd IM letter informing them of their right to appeal the discharge. Signed copy has been placed on pt bedside chart. Radha Araya, [facility]

## 2021-03-11 NOTE — DISCHARGE INSTRUCTIONS
Discharge Instructions       PATIENT ID: Aissatou Del Castillo  MRN: [id number]   YOB: 1935    DATE OF ADMISSION: 2/27/2021  7:36 PM    DATE OF DISCHARGE: 3/11/2021    PRIMARY CARE PROVIDER: Mara Man MD     ATTENDING PHYSICIAN: Cristofer Pratt MD  DISCHARGING PROVIDER: José Luis Alvarenga NP    To contact this individual call [phone] and ask the  to page. If unavailable ask to be transferred the Adult Hospitalist Department. DISCHARGE DIAGNOSES Dementia with severe debility, adult failure to thrive     CONSULTATIONS: IP CONSULT TO GENERAL SURGERY  IP CONSULT TO PALLIATIVE CARE - PROVIDER    PROCEDURES/SURGERIES: * No surgery found *    PENDING TEST RESULTS:   At the time of discharge the following test results are still pending: None     FOLLOW UP APPOINTMENTS:   Follow-up Information     Follow up With Specialties Details Why Contact Info    Mara Man MD Family Medicine  Follow up as needed. [address] [place] [address] Road  [phone]             ADDITIONAL CARE RECOMMENDATIONS:   Planned for patient to be admitted into hospice services upon discharge. DIET: Comfort feeding      ACTIVITY: Activity as tolerated    WOUND CARE: Wound, Pressure Prevention & Skin Care Recommendations:    1. Minimize layers of linen/pads under patient to optimize support surface.    2.  Turn/reposition approximately every 2 hours and offload heels.  Patient mobility team to assist with q2 turns. 3.  Manage moisture/ Keep skin folds clean and dry. 4.  Specialty bed: prius ordered via [employer]. Use only flat sheet and one incontinence pad. 5.  Sacrum:  Daily cleanse with Dakins; apply Silver Alginate (Opticel Ag); cover with dry dressing.   6.  Left hip:  Protect with foam.  7.  Right breast, Left breast, left ankle, bilateral heels, left medial foot, left lateral foot and 5th toe, left knee, right knee, right ankle, right lateral foot and right and left back:  Apply Venelex BID  8.  Right elbow and right low leg:  Daily cleanse with saline; apply Xeroform; cover with dry dressing. 9. Left inner thigh:  Apply Zinc cream BID.       EQUIPMENT needed: Per hospice      DISCHARGE MEDICATIONS:   See Medication Reconciliation Form    · It is important that you take the medication exactly as they are prescribed. · Keep your medication in the bottles provided by the pharmacist and keep a list of the medication names, dosages, and times to be taken in your wallet. · Do not take other medications without consulting your doctor. NOTIFY YOUR PHYSICIAN FOR ANY OF THE FOLLOWING:   Fever over 101 degrees for 24 hours. Chest pain, shortness of breath, fever, chills, nausea, vomiting, diarrhea, change in mentation, falling, weakness, bleeding. Severe pain or pain not relieved by medications. Or, any other signs or symptoms that you may have questions about.       DISPOSITION:    Home With:   OT  PT  [facility]ISON  RN       SNF/Inpatient Rehab/LTAC    Independent/assisted living   X Hospice    Other:         Signed:   Regina [place] NP  3/11/2021  9:59 AM

## 2021-03-15 VITALS — DIASTOLIC BLOOD PRESSURE: 57 MMHG | SYSTOLIC BLOOD PRESSURE: 90 MMHG | RESPIRATION RATE: 14 BRPM

## 2021-04-05 ENCOUNTER — HOME CARE VISIT (OUTPATIENT)
Dept: HOSPICE | Facility: HOSPICE | Age: 86
End: 2021-04-05
Payer: MEDICARE

## 2021-08-21 DIAGNOSIS — D69.3 ITP SECONDARY TO INFECTION (HCC): ICD-10-CM
